# Patient Record
Sex: FEMALE | Race: WHITE | NOT HISPANIC OR LATINO | Employment: OTHER | URBAN - METROPOLITAN AREA
[De-identification: names, ages, dates, MRNs, and addresses within clinical notes are randomized per-mention and may not be internally consistent; named-entity substitution may affect disease eponyms.]

---

## 2021-05-09 ENCOUNTER — HOSPITAL ENCOUNTER (OUTPATIENT)
Dept: RADIOLOGY | Facility: MEDICAL CENTER | Age: 66
End: 2021-05-09
Attending: INTERNAL MEDICINE
Payer: MEDICARE

## 2021-05-09 DIAGNOSIS — R63.6 UNDERWEIGHT: ICD-10-CM

## 2021-05-09 DIAGNOSIS — K90.0 CELIAC DISEASE: ICD-10-CM

## 2021-05-09 DIAGNOSIS — K31.84 GASTROPARESIS: ICD-10-CM

## 2021-05-09 DIAGNOSIS — K85.90 ACUTE PANCREATITIS, UNSPECIFIED COMPLICATION STATUS, UNSPECIFIED PANCREATITIS TYPE: ICD-10-CM

## 2021-05-09 DIAGNOSIS — R10.13 ABDOMINAL PAIN, EPIGASTRIC: ICD-10-CM

## 2021-05-09 PROCEDURE — 74183 MRI ABD W/O CNTR FLWD CNTR: CPT | Mod: MH

## 2021-05-09 PROCEDURE — 700117 HCHG RX CONTRAST REV CODE 255

## 2021-05-09 PROCEDURE — A9576 INJ PROHANCE MULTIPACK: HCPCS

## 2021-05-09 RX ADMIN — GADOTERIDOL 6 ML: 279.3 INJECTION, SOLUTION INTRAVENOUS at 16:07

## 2021-07-20 ENCOUNTER — HOSPITAL ENCOUNTER (OUTPATIENT)
Dept: RADIOLOGY | Facility: MEDICAL CENTER | Age: 66
End: 2021-07-20
Attending: INTERNAL MEDICINE
Payer: MEDICARE

## 2021-07-20 DIAGNOSIS — R10.13 EPIGASTRIC PAIN: ICD-10-CM

## 2021-07-20 DIAGNOSIS — R63.6 UNDERWEIGHT: ICD-10-CM

## 2021-07-20 DIAGNOSIS — K85.90 ACUTE PANCREATITIS, UNSPECIFIED COMPLICATION STATUS, UNSPECIFIED PANCREATITIS TYPE: ICD-10-CM

## 2021-07-20 DIAGNOSIS — K31.84 GASTROPARESIS: ICD-10-CM

## 2021-07-20 PROCEDURE — A9541 TC99M SULFUR COLLOID: HCPCS

## 2021-08-02 ENCOUNTER — PRE-ADMISSION TESTING (OUTPATIENT)
Dept: ADMISSIONS | Facility: MEDICAL CENTER | Age: 66
End: 2021-08-02
Attending: INTERNAL MEDICINE
Payer: MEDICARE

## 2021-08-02 VITALS — WEIGHT: 66 LBS | HEIGHT: 62 IN | BODY MASS INDEX: 12.15 KG/M2

## 2021-08-02 RX ORDER — HYDROCODONE BITARTRATE AND ACETAMINOPHEN 5; 325 MG/1; MG/1
1 TABLET ORAL EVERY 4 HOURS PRN
Status: ON HOLD | COMMUNITY
End: 2023-05-10 | Stop reason: SDUPTHER

## 2021-08-02 RX ORDER — LEVOTHYROXINE SODIUM 0.05 MG/1
50 TABLET ORAL
COMMUNITY

## 2021-08-02 RX ORDER — BIOTIN 10 MG
TABLET ORAL DAILY
COMMUNITY
End: 2023-07-06

## 2021-08-02 NOTE — PREPROCEDURE INSTRUCTIONS
" Reviewed \"Preparing for your procedure\" verbally and copy emailed to pt. Also emailed \"fasting\", Pain management\", \"Patient Safety\", \"Speak-up\" handouts. Agreed with procedure listed on consent. Instructed to take thyroid med DOS.  "

## 2021-08-05 NOTE — OR NURSING
Called [facility] to obtain copy of COVID test results, went to voicemail recording- states that clinic is closed due to evacuation. Called patient to see if she had copy of results and pt states she did not have any results. Pt states she has been evacuated along with the whole town. Asked patient if she is going to postpone procedure do to these circumstances and she states no she wants to go ahead and still proceed as planned. Discussed situation with Shelli Preop Manager and she agreed that we can test patient morning of procedure. Called patient back and informed her that she will need to call [phone] once she arrived to parking lot and stay in car, and a nurse will come out and test her and will call her when she is cleared to check in for procedure. Called Dr Cadet's office, could not get thru to  had to leave a message regarding the plan of patient being COVID tested DOS.

## 2021-08-06 NOTE — OR NURSING
1. Caller Name: Liban                          Call Back Number: [phone]        2.  Does patient have any active symptoms of respiratory illness (fever OR cough OR shortness of breath)? No.

## 2021-08-08 ENCOUNTER — ANESTHESIA EVENT (OUTPATIENT)
Dept: SURGERY | Facility: MEDICAL CENTER | Age: 66
End: 2021-08-08
Payer: MEDICARE

## 2021-08-08 PROBLEM — Z98.890 PONV (POSTOPERATIVE NAUSEA AND VOMITING): Chronic | Status: ACTIVE | Noted: 2021-08-08

## 2021-08-08 PROBLEM — R11.2 PONV (POSTOPERATIVE NAUSEA AND VOMITING): Chronic | Status: ACTIVE | Noted: 2021-08-08

## 2021-08-09 ENCOUNTER — HOSPITAL ENCOUNTER (OUTPATIENT)
Facility: MEDICAL CENTER | Age: 66
End: 2021-08-09
Attending: INTERNAL MEDICINE | Admitting: INTERNAL MEDICINE
Payer: MEDICARE

## 2021-08-09 ENCOUNTER — ANESTHESIA (OUTPATIENT)
Dept: SURGERY | Facility: MEDICAL CENTER | Age: 66
End: 2021-08-09
Payer: MEDICARE

## 2021-08-09 VITALS
BODY MASS INDEX: 11.9 KG/M2 | DIASTOLIC BLOOD PRESSURE: 55 MMHG | RESPIRATION RATE: 16 BRPM | HEART RATE: 54 BPM | TEMPERATURE: 97.3 F | WEIGHT: 65.04 LBS | SYSTOLIC BLOOD PRESSURE: 94 MMHG | OXYGEN SATURATION: 100 %

## 2021-08-09 PROBLEM — K80.50 BILIARY COLIC: Chronic | Status: ACTIVE | Noted: 2021-08-09

## 2021-08-09 PROBLEM — K31.84 GASTROPARESIS: Status: ACTIVE | Noted: 2021-08-09

## 2021-08-09 LAB
SARS-COV+SARS-COV-2 AG RESP QL IA.RAPID: NOTDETECTED
SPECIMEN SOURCE: NORMAL

## 2021-08-09 PROCEDURE — 700101 HCHG RX REV CODE 250: Performed by: ANESTHESIOLOGY

## 2021-08-09 PROCEDURE — 160046 HCHG PACU - 1ST 60 MINS PHASE II: Performed by: INTERNAL MEDICINE

## 2021-08-09 PROCEDURE — 700101 HCHG RX REV CODE 250: Performed by: INTERNAL MEDICINE

## 2021-08-09 PROCEDURE — 110371 HCHG SHELL REV 272: Performed by: INTERNAL MEDICINE

## 2021-08-09 PROCEDURE — 160208 HCHG ENDO MINUTES - EA ADDL 1 MIN LEVEL 4: Performed by: INTERNAL MEDICINE

## 2021-08-09 PROCEDURE — 160002 HCHG RECOVERY MINUTES (STAT): Performed by: INTERNAL MEDICINE

## 2021-08-09 PROCEDURE — 700111 HCHG RX REV CODE 636 W/ 250 OVERRIDE (IP): Performed by: ANESTHESIOLOGY

## 2021-08-09 PROCEDURE — C1726 CATH, BAL DIL, NON-VASCULAR: HCPCS | Performed by: INTERNAL MEDICINE

## 2021-08-09 PROCEDURE — 160009 HCHG ANES TIME/MIN: Performed by: INTERNAL MEDICINE

## 2021-08-09 PROCEDURE — 160048 HCHG OR STATISTICAL LEVEL 1-5: Performed by: INTERNAL MEDICINE

## 2021-08-09 PROCEDURE — 700102 HCHG RX REV CODE 250 W/ 637 OVERRIDE(OP): Performed by: ANESTHESIOLOGY

## 2021-08-09 PROCEDURE — C1769 GUIDE WIRE: HCPCS | Performed by: INTERNAL MEDICINE

## 2021-08-09 PROCEDURE — A9270 NON-COVERED ITEM OR SERVICE: HCPCS | Performed by: ANESTHESIOLOGY

## 2021-08-09 PROCEDURE — 160025 RECOVERY II MINUTES (STATS): Performed by: INTERNAL MEDICINE

## 2021-08-09 PROCEDURE — 160035 HCHG PACU - 1ST 60 MINS PHASE I: Performed by: INTERNAL MEDICINE

## 2021-08-09 PROCEDURE — 700105 HCHG RX REV CODE 258: Performed by: INTERNAL MEDICINE

## 2021-08-09 PROCEDURE — 87426 SARSCOV CORONAVIRUS AG IA: CPT

## 2021-08-09 PROCEDURE — 160203 HCHG ENDO MINUTES - 1ST 30 MINS LEVEL 4: Performed by: INTERNAL MEDICINE

## 2021-08-09 RX ORDER — OXYCODONE HCL 5 MG/5 ML
10 SOLUTION, ORAL ORAL
Status: COMPLETED | OUTPATIENT
Start: 2021-08-09 | End: 2021-08-09

## 2021-08-09 RX ORDER — ONDANSETRON 2 MG/ML
INJECTION INTRAMUSCULAR; INTRAVENOUS PRN
Status: DISCONTINUED | OUTPATIENT
Start: 2021-08-09 | End: 2021-08-09 | Stop reason: SURG

## 2021-08-09 RX ORDER — MEPERIDINE HYDROCHLORIDE 25 MG/ML
6.25 INJECTION INTRAMUSCULAR; INTRAVENOUS; SUBCUTANEOUS
Status: DISCONTINUED | OUTPATIENT
Start: 2021-08-09 | End: 2021-08-09 | Stop reason: HOSPADM

## 2021-08-09 RX ORDER — SUCCINYLCHOLINE CHLORIDE 20 MG/ML
INJECTION INTRAMUSCULAR; INTRAVENOUS PRN
Status: DISCONTINUED | OUTPATIENT
Start: 2021-08-09 | End: 2021-08-09 | Stop reason: SURG

## 2021-08-09 RX ORDER — HALOPERIDOL 5 MG/ML
1 INJECTION INTRAMUSCULAR
Status: DISCONTINUED | OUTPATIENT
Start: 2021-08-09 | End: 2021-08-09 | Stop reason: HOSPADM

## 2021-08-09 RX ORDER — ONDANSETRON 2 MG/ML
4 INJECTION INTRAMUSCULAR; INTRAVENOUS
Status: DISCONTINUED | OUTPATIENT
Start: 2021-08-09 | End: 2021-08-09 | Stop reason: HOSPADM

## 2021-08-09 RX ORDER — PHENYLEPHRINE HCL IN 0.9% NACL 0.5 MG/5ML
SYRINGE (ML) INTRAVENOUS PRN
Status: DISCONTINUED | OUTPATIENT
Start: 2021-08-09 | End: 2021-08-09 | Stop reason: SURG

## 2021-08-09 RX ORDER — SODIUM CHLORIDE, SODIUM LACTATE, POTASSIUM CHLORIDE, CALCIUM CHLORIDE 600; 310; 30; 20 MG/100ML; MG/100ML; MG/100ML; MG/100ML
INJECTION, SOLUTION INTRAVENOUS CONTINUOUS
Status: DISCONTINUED | OUTPATIENT
Start: 2021-08-09 | End: 2021-08-09 | Stop reason: HOSPADM

## 2021-08-09 RX ORDER — OXYCODONE HCL 5 MG/5 ML
5 SOLUTION, ORAL ORAL
Status: COMPLETED | OUTPATIENT
Start: 2021-08-09 | End: 2021-08-09

## 2021-08-09 RX ORDER — LIDOCAINE HYDROCHLORIDE 20 MG/ML
INJECTION, SOLUTION EPIDURAL; INFILTRATION; INTRACAUDAL; PERINEURAL PRN
Status: DISCONTINUED | OUTPATIENT
Start: 2021-08-09 | End: 2021-08-09 | Stop reason: SURG

## 2021-08-09 RX ORDER — DEXAMETHASONE SODIUM PHOSPHATE 4 MG/ML
INJECTION, SOLUTION INTRA-ARTICULAR; INTRALESIONAL; INTRAMUSCULAR; INTRAVENOUS; SOFT TISSUE PRN
Status: DISCONTINUED | OUTPATIENT
Start: 2021-08-09 | End: 2021-08-09 | Stop reason: SURG

## 2021-08-09 RX ORDER — ACETAMINOPHEN 325 MG/1
650 TABLET ORAL EVERY 4 HOURS PRN
Status: DISCONTINUED | OUTPATIENT
Start: 2021-08-09 | End: 2021-08-09 | Stop reason: HOSPADM

## 2021-08-09 RX ADMIN — SODIUM CHLORIDE, POTASSIUM CHLORIDE, SODIUM LACTATE AND CALCIUM CHLORIDE: 600; 310; 30; 20 INJECTION, SOLUTION INTRAVENOUS at 07:07

## 2021-08-09 RX ADMIN — PROPOFOL 20 MG: 10 INJECTION, EMULSION INTRAVENOUS at 08:29

## 2021-08-09 RX ADMIN — OXYCODONE HYDROCHLORIDE 5 MG: 5 SOLUTION ORAL at 08:49

## 2021-08-09 RX ADMIN — SUCCINYLCHOLINE CHLORIDE 30 MG: 20 INJECTION, SOLUTION INTRAMUSCULAR; INTRAVENOUS at 07:43

## 2021-08-09 RX ADMIN — ONDANSETRON 4 MG: 2 INJECTION INTRAMUSCULAR; INTRAVENOUS at 08:19

## 2021-08-09 RX ADMIN — DEXAMETHASONE SODIUM PHOSPHATE 4 MG: 4 INJECTION, SOLUTION INTRAMUSCULAR; INTRAVENOUS at 07:46

## 2021-08-09 RX ADMIN — FENTANYL CITRATE 25 MCG: 50 INJECTION, SOLUTION INTRAMUSCULAR; INTRAVENOUS at 09:00

## 2021-08-09 RX ADMIN — Medication 100 MCG: at 07:48

## 2021-08-09 RX ADMIN — PROPOFOL 60 MG: 10 INJECTION, EMULSION INTRAVENOUS at 07:43

## 2021-08-09 RX ADMIN — FENTANYL CITRATE 25 MCG: 50 INJECTION, SOLUTION INTRAMUSCULAR; INTRAVENOUS at 08:49

## 2021-08-09 RX ADMIN — LIDOCAINE HYDROCHLORIDE 0.5 ML: 10 INJECTION, SOLUTION INFILTRATION; PERINEURAL at 07:07

## 2021-08-09 RX ADMIN — LIDOCAINE HYDROCHLORIDE 40 MG: 20 INJECTION, SOLUTION EPIDURAL; INFILTRATION; INTRACAUDAL; PERINEURAL at 07:43

## 2021-08-09 RX ADMIN — FENTANYL CITRATE 25 MCG: 50 INJECTION, SOLUTION INTRAMUSCULAR; INTRAVENOUS at 08:50

## 2021-08-09 NOTE — OR NURSING
0904: Care assumed of drowsy pt who rouses easily and describes pain as tolerable.  0920: Meets criteria to transfer to Stage 2.

## 2021-08-09 NOTE — PROCEDURES
DATE OF PROCEDURE:  08/09/2021     PROCEDURES:  1.  Esophagogastroduodenoscopy with hydraulic dilation of gastric outlet.  2.  Endoscopic ultrasound, upper.  3.  Endoscopic retrograde cholangiopancreatography with biliary   sphincterotomy, bile duct balloon soft stone debris extraction, radiologic   interpretation of static and dynamic fluoroscopic images by myself, at no time   was a radiologist present in the room.     INDICATIONS:  Biliary colic, history of elevated liver enzymes, abdominal   pain, failure to thrive.     FINAL IMPRESSION:  EGD FINDINGS:  1.  Esophagus:  Unremarkable mucosa.  2.  Long and J-shaped stomach, otherwise unremarkable mucosa.  3.  Mildly stenotic pylorus, successfully dilated to 20 mm with balloon.  4.  Duodenum:  Unremarkable mucosa.  Possible extrinsic compression of the   second portion.     ENDOSCOPIC ULTRASOUND FINDINGS:  1.  Celiac axis, no adenopathy.  2.  Pancreatic body and tail, normal parenchyma.  3.  Pancreatic duct, normal course and caliber.  4.  Gallbladder, surgically absent.  5.  Biliary ampulla, unremarkable.  6.  Common bile duct, generous caliber down to the level of the ampulla.    Normal course.  Small subtle nonshadowing filling defect suspicious of stone   versus sludge ball.  7.  Head of pancreas, normal parenchyma and normal dorsal ventral transition.     ERCP FINDINGS:  1.  Biliary ampulla, small, but otherwise unremarkable appearance.  Stenotic   to wire and cannula passage.  2.  Pancreatic duct, neither injected nor cannulated.  3.  Common bile duct, normal course.  Generous caliber.  Small distal filling   defect consistent with small stone.     THERAPEUTICS:  1.  10 mm biliary sphincterotomy with bow papillotome over covered wire.  2.  Bile duct balloon stone extraction productive of soft stone debris.     RECOMMENDATIONS:  1.  Follow liver enzymes.  2.  Low fat diet for 24 hours, then advance as tolerated.  3.  Follow up in the office with   Candelario.  4.  If symptoms persist, consider further workup for superior mesenteric   artery syndrome.     PROCEDURE:  Prior to the procedure, physical exam was stable.  During   procedure, vital signs remained within normal limits.  Prior to sedation,   informed consent was obtained.  Risks of bleeding, infection, perforation,   adverse reaction to sedating medicine, failure to identify pathology and death   explained to the patient at length.  She accepted all risks.  The patient was   prepped in the prone position after intubation and sedation by anesthesia.     EGD:  Scope tip of the Olympus flexible gastroscope passed in the proximal   esophagus.  Proximal middle and distal thirds of the esophagus were well   visualized and were unremarkable.  Stomach was entered.  Gastric pool   suctioned, air insufflated, scope retroflexed to view the body, fundus, and   cardia, then straightened to view the antrum and incisura.  The stomach was   long and J-shaped, but otherwise had unremarkable mucosa throughout.  The   pylorus was patent, but mildly stenotic.  The duodenal bulb, sweep, second and   third portion showed unremarkable mucosa, but there was slight suspected   extrinsic compression of the second portion of unclear significance, possibly   representing SMA syndrome.  The gastroscope was left in the third portion of   the duodenum and a wire-guided 18-19-20 mm hydraulic dilation balloon was   exchanged over wire down into the duodenum.  This was withdrawn gently across   the pylorus and slowly inflated to 18, then 19, then 20 mm.  This was held in   place for 2 minutes at 20 mm and slightly over inflated by 1 atmosphere and   held in place for an additional 30 seconds.  The balloon was deflated and   significant dilation of the pylorus was observed.  Tiny mucosal break was   seen.  The balloon and scope were withdrawn.  Air and liquid were suctioned   and we proceeded with endoscopic ultrasound.     EUS:  A  flexible radial echoendoscope passed in the proximal stomach.  Imaging   of the celiac axis was unremarkable.  Imaging of the pancreatic body and tail   demonstrated very normal appearing parenchyma throughout with no evidence of   chronic pancreatitis.  The pancreatic duct itself had a normal course and   caliber.  Scope was advanced into the antrum. From the antrum, the gallbladder   was not identified consistent with being surgically absent.  The scope was   advanced into the duodenum.  The biliary ampulla was endosonographically   unremarkable.  The head of the pancreas showed normal dorsal ventral   transition and very normal parenchyma with no evidence of chronic   pancreatitis.  The common bile duct was generous caliber down to the level of   the ampulla that had normal course.  There was a subtle small nonshadowing   filling defect in the bile duct, likely representing a soft stone.  The radial   echoendoscope was withdrawn and we proceeded with endoscopic retrograde   cholangiopancreatography.     ERCP: The flexible side-viewing TJF duodenoscope passed to the level of the   biliary ampulla in the short position.  The biliary ampulla was very small,   but otherwise unremarkable appearance.  An Olympus CleverCut sphincterotome   with a 0.035 wire was utilized for selective cannulation of the common bile   duct.  At no time was the pancreatic duct injected or cannulated.  The   cannulation of the bile duct was slightly challenging.  This was probed with   the wire and the tip of the cannula until the wire passed.  Even after the   wire passed along the expected course of the bile duct, this was followed with   cannula and the ampulla was very stenotic to passage of the cannula.  This is   suggestive of stenosis versus sphincter of Oddi spasm.  Aspiration was made   and was positive for bile.  Injection of contrast demonstrated normal course   of the bile duct, but generous caliber down to the ampulla.  There  was a   subtle filling defect in the distal duct consistent with a very small stone.    Following this, a 10 mm biliary sphincterotomy was performed with bow   papillotome over the covered wire in the usual fashion.  A gush of bile was   seen.  The tome was removed and a 9-12 mm occlusion balloon was exchanged over   the wire and 3 separate balloon sweeps were made from the common hepatic   duct.  The first balloon sweep was productive of soft stone debris consistent   with a stone fracturing on passage of the balloon.  The balloon pulled through   the sphincterotomy site with minimal effort.  The additional balloon sweeps   were completely negative for any further debris.  Procedure was deemed   complete at this time.  Scope was withdrawn.  Air and liquid were suctioned.    Patient tolerated the procedure well and was sent to recovery without   immediate complications.        ______________________________  MD JULIET DUNLAP/ERICA    DD:  08/09/2021 08:45  DT:  08/09/2021 09:41    Job#:  [id number]

## 2021-08-09 NOTE — ANESTHESIA PREPROCEDURE EVALUATION
66 yo F with history of recurrent pancreatitis, gastroparesis and PONV here for EGD and likely ERCP.  NPO.     Relevant Problems   ANESTHESIA   (positive) PONV (postoperative nausea and vomiting)      Other   (positive) Gastroparesis   (positive) Recurrent pancreatitis       Physical Exam    Airway   Mallampati: I  TM distance: >3 FB  Neck ROM: full       Cardiovascular - normal exam  Rhythm: regular  Rate: normal  (-) murmur     Dental - normal exam           Pulmonary - normal exam  Breath sounds clear to auscultation     Abdominal    Neurological - normal exam               Anesthesia Plan    ASA 3   ASA physical status 3 criteria: other (comment)    Plan - general       Airway plan will be ETT          Induction: intravenous and rapid sequence      Pertinent diagnostic labs and testing reviewed    Informed Consent:    Anesthetic plan and risks discussed with patient.    Use of blood products discussed with: patient whom consented to blood products.

## 2021-08-09 NOTE — ANESTHESIA POSTPROCEDURE EVALUATION
Patient: Kathy Dumont    Procedure Summary     Date: 08/09/21 Room / Location:  ENDOSCOPIC ULTRASOUND ROOM / SURGERY [facility]    Anesthesia Start:  Anesthesia Stop:     Procedures:       GASTROSCOPY      EGD, WITH ENDOSCOPIC US - UPPER RADIAL      EGD, WITH ASPIRATION BIOPSY - WITH FINE NEEDLE ASPIRATION Diagnosis: (ACUTE RECURRENT PANCREATITIS, EPIGASTRIC PAIN, ABNORMAL WEIGHT LOSS)    Surgeons: Rivas Cadet M.D. Responsible Provider:     Anesthesia Type: Not recorded ASA Status: Not recorded          Final Anesthesia Type: general  Last vitals  BP   Blood Pressure : 103/55    Temp   36 °C (96.8 °F)    Pulse   (!) 54   Resp   14    SpO2   95 %      Anesthesia Post Evaluation    Patient location during evaluation: PACU  Patient participation: complete - patient participated  Level of consciousness: awake and alert    Airway patency: patent  Anesthetic complications: no  Cardiovascular status: hemodynamically stable  Respiratory status: acceptable  Hydration status: euvolemic    PONV: none          No complications documented.     Nurse Pain Score: 0 (NPRS)

## 2021-08-09 NOTE — ANESTHESIA TIME REPORT
Anesthesia Start and Stop Event Times     Date Time Event    8/9/2021 0725 Ready for Procedure     0739 Anesthesia Start     0833 Anesthesia Stop        Responsible Staff  08/09/21    Name Role Begin End    Kaykay Cantrell M.D. Anesth 0739 0833        Preop Diagnosis (Free Text):  Pre-op Diagnosis     ACUTE RECURRENT PANCREATITIS, EPIGASTRIC PAIN, ABNORMAL WEIGHT LOSS        Preop Diagnosis (Codes):  Diagnosis Information     Diagnosis Code(s): Choledocholithiasis [K80.50]        Post op Diagnosis  Acute recurrent pancreatitis      Premium Reason  Non-Premium    Comments:

## 2021-08-09 NOTE — OR NURSING
0920: Report from Marissa TO    0924: Patient arrived from PACU via gurney.    Sedation/Resp Status: Alert, spontaneous eye opening. Respirations spontaneous and non-labored. Denies pain/nausea.     0941: Family arrived to patient station    0955: Patient education completed, family denies further questions. DC'd to care of family post uneventful stay in PACU 2.

## 2021-08-09 NOTE — OR SURGEON
Immediate Post OP Note    PreOp Diagnosis: Biliary colic, abdominal pain      PostOp Diagnosis: Same      Procedure(s):  GASTROSCOPY - Wound Class: Clean Contaminated  EGD, WITH ENDOSCOPIC US - UPPER RADIAL - Wound Class: Clean Contaminated  EGD, WITH ASPIRATION BIOPSY - WITH FINE NEEDLE ASPIRATION - Wound Class: Clean Contaminated  ERCP, DIAGNOSTIC - Wound Class: Clean Contaminated    Surgeon(s):  Rivas Cadet M.D.    Anesthesiologist/Type of Anesthesia:  Anesthesiologist: Kaykay Cantrell M.D./General    Surgical Staff:  Circulator: Eduardo Jain R.N.  Endoscopy Technician: Drew Garrison    Specimens removed if any:  * No specimens in log *    Dr. Cadet  [facility]  [place]  [phone]    EGD with: Hydraulic dilation of gastric outlet    EUS upper    ERCP with: Biliary sphincterotomy    Bile duct balloon soft stone debris extraction    Radiologic interpretation of static and dynamic fluoroscopic images    Indication: Biliary colic, failure to thrive, abdominal pain    Sedation: GA (Óscar)    Findings:   EGD    Esophagus     Unremarkable mucosa    Stomach     Long and J-shaped     Unremarkable mucosa    Pylorus     Mildly stenotic     Hydraulically dilated with 18-19-20 mm balloon    Duodenum     Unremarkable mucosa     Compressed in 2nd portion     EUS    Celiac axis     No adenopathy    Pancreas body/tail     Normal parenchyma    Pancreatic duct     Normal course and caliber    Gallbladder     Absent    Ampulla     Unremarkable    CBD     Normal course     Generous caliber down to ampulla     Small, subtle, non-shadowing filling defect    Head of pancreas     Normal parenchyma     Normal dorsal / ventral transition     ERCP    Ampulla     Small     Otherwise unremarkable appearance     Stenotic to wire and cannula passage    Pancreatic duct     Neither injected nor cannulated    CBD     Normal course     Generous caliber     Small distal filling defect     Therapeutics    10mm  biliary sphincterotomy with bow papillotome over covered wire    Bile duct balloon extraction of soft stone debris    Plan:  Follow liver enzymes    Low fat diet x 24 hours then advance as tolerated    Follow up in office with Dr. Palomino    If symptoms persist, consider further workup for SMA syndrome      8/9/2021 8:29 AM Rivas Cadet M.D.

## 2021-08-09 NOTE — ANESTHESIA PROCEDURE NOTES
Airway    Date/Time: 8/9/2021 7:44 AM  Performed by: Kaykay Cantrell M.D.  Authorized by: Kaykay Cantrell M.D.     Location:  OR  Urgency:  Elective  Difficult Airway: No    Indications for Airway Management:  Anesthesia      Spontaneous Ventilation: absent    Sedation Level:  Deep  Preoxygenated: Yes    Patient Position:  Sniffing  Mask Difficulty Assessment:  0 - not attempted  Final Airway Type:  Endotracheal airway  Final Endotracheal Airway:  ETT  Cuffed: Yes    Technique Used for Successful ETT Placement:  Direct laryngoscopy  Devices/Methods Used in Placement:  Cricoid pressure and intubating stylet    Insertion Site:  Oral  Blade Type:  Denis  Laryngoscope Blade/Videolaryngoscope Blade Size:  3  ETT Size (mm):  6.5  Measured from:  Teeth  ETT to Teeth (cm):  20  Placement Verified by: auscultation and capnometry    Cormack-Lehane Classification:  Grade I - full view of glottis  Number of Attempts at Approach:  1

## 2021-08-09 NOTE — DISCHARGE INSTRUCTIONS
ENDOSCOPY HOME CARE INSTRUCTIONS    GASTROSCOPY OR ERCP  1. Don't eat or drink anything for about an hour after the test. Low Fat Diet for 24 hours then  Slowly return to normal diet.   2. Don't drive or drink alcohol for 24 hours. The medication you received will make you too drowsy.  3. Don't take any coffee, tea, or aspirin products until after you see your doctor. These can harm the lining of your stomach.  4. If you begin to vomit bloody material, or develop black or bloody stools, call your doctor as soon as possible.  5. If you have any neck, chest, abdominal pain or temp of 100 degrees, call your doctor.    Physician's telephone #: [phone]    You were Given oral pain medication oxycodone in recovery at 8:49 am.         You should call 911 if you develop problems with breathing or chest pain.  If any questions arise, call your doctor. If your doctor is not available, please feel free to call [phone]. You can also call the HEALTH HOTLINE open 24 hours/day, 7 days/week and speak to a nurse at [phone], or toll free [phone].    Depression / Suicide Risk    As you are discharged from [facility], it is important to learn how to keep safe from harming yourself.    Recognize the warning signs:  · Abrupt changes in personality, positive or negative- including increase in energy   · Giving away possessions  · Change in eating patterns- significant weight changes-  positive or negative  · Change in sleeping patterns- unable to sleep or sleeping all the time   · Unwillingness or inability to communicate  · Depression  · Unusual sadness, discouragement and loneliness  · Talk of wanting to die  · Neglect of personal appearance   · Rebelliousness- reckless behavior  · Withdrawal from people/activities they love  · Confusion- inability to concentrate     If you or a loved one observes any of these behaviors or has concerns about self-harm, here's what you can do:  · Talk about it-  your feelings and reasons for harming yourself  · Remove any means that you might use to hurt yourself (examples: pills, rope, extension cords, firearm)  · Get professional help from the community (Mental Health, Substance Abuse, psychological counseling)  · Do not be alone:Call your Safe Contact- someone whom you trust who will be there for you.  · Call your local CRISIS HOTLINE 594-1869 or 675-775-6962  · Call your local Children's Mobile Crisis Response Team [place] (750) 203-2129 or wwwMobileOCT  · Call the toll free National Suicide Prevention Hotlines   · National Suicide Prevention Lifeline 168-015-ZVQT (8756)  · National Hope Line Network 800-SUICIDE (278-0114)    I acknowledge receipt and understanding of these Home Care Instructions.

## 2022-04-07 ENCOUNTER — PRE-ADMISSION TESTING (OUTPATIENT)
Dept: ADMISSIONS | Facility: MEDICAL CENTER | Age: 67
End: 2022-04-07
Attending: INTERNAL MEDICINE
Payer: MEDICARE

## 2022-04-07 RX ORDER — BIOTIN 10 MG
TABLET ORAL DAILY
COMMUNITY
End: 2023-07-06

## 2022-04-07 NOTE — PREPROCEDURE INSTRUCTIONS
"Preadmit appointment: \" Preparing for your Procedure information\" sheet given to patient with verbal and written instructions (emailed). Patient instructed to continue prescribed medications through the day before surgery, instructed to take the following medications the day of surgery per anesthesia protocol: Levothyroxine, hydrocodone if needed  Verbal and written instructions on covid symptoms to watch for given to patient and patient instructed to call MD if any symptoms develop prior to surgery/procedure. Pt reports hx PONV, advised to discuss with anesthesia day of surgery.  No covid symptoms   "

## 2022-04-15 NOTE — OR NURSING
0832- pt arrived from [facility] following gastroscopy with ERCP, she is on 4L O2 via mask and arousable via physical stimulation.     0849- pt sat up in bed, O2 removed. Medicated for  abdominal pain per orders       0900-called and updated patients  Prasad.     0904-report given to Marissa TO. Pt reports pain level 6, but tolerable at this time.              Humphrey Desouza,    Patient stated now that she was diagnosed with bilateral PE back in 10/8/2021. Patient is having dental extraction on Monday 4/18/2022. Can she proceed with dental extraction and does she need bridging still since it been less than 6 months?     Thank you,    Aretha

## 2022-04-18 ENCOUNTER — APPOINTMENT (OUTPATIENT)
Dept: RADIOLOGY | Facility: MEDICAL CENTER | Age: 67
End: 2022-04-18
Attending: INTERNAL MEDICINE
Payer: MEDICARE

## 2022-04-18 ENCOUNTER — ANESTHESIA EVENT (OUTPATIENT)
Dept: SURGERY | Facility: MEDICAL CENTER | Age: 67
End: 2022-04-18
Payer: MEDICARE

## 2022-04-18 ENCOUNTER — ANESTHESIA (OUTPATIENT)
Dept: SURGERY | Facility: MEDICAL CENTER | Age: 67
End: 2022-04-18
Payer: MEDICARE

## 2022-04-18 ENCOUNTER — HOSPITAL ENCOUNTER (OUTPATIENT)
Facility: MEDICAL CENTER | Age: 67
End: 2022-04-18
Attending: INTERNAL MEDICINE | Admitting: INTERNAL MEDICINE
Payer: MEDICARE

## 2022-04-18 VITALS
SYSTOLIC BLOOD PRESSURE: 101 MMHG | RESPIRATION RATE: 16 BRPM | HEIGHT: 62 IN | OXYGEN SATURATION: 97 % | WEIGHT: 64.59 LBS | DIASTOLIC BLOOD PRESSURE: 55 MMHG | BODY MASS INDEX: 11.89 KG/M2 | HEART RATE: 64 BPM | TEMPERATURE: 97.5 F

## 2022-04-18 LAB — EKG IMPRESSION: NORMAL

## 2022-04-18 PROCEDURE — C1769 GUIDE WIRE: HCPCS | Performed by: INTERNAL MEDICINE

## 2022-04-18 PROCEDURE — 160208 HCHG ENDO MINUTES - EA ADDL 1 MIN LEVEL 4: Performed by: INTERNAL MEDICINE

## 2022-04-18 PROCEDURE — 160203 HCHG ENDO MINUTES - 1ST 30 MINS LEVEL 4: Performed by: INTERNAL MEDICINE

## 2022-04-18 PROCEDURE — 700101 HCHG RX REV CODE 250: Performed by: ANESTHESIOLOGY

## 2022-04-18 PROCEDURE — 160046 HCHG PACU - 1ST 60 MINS PHASE II: Performed by: INTERNAL MEDICINE

## 2022-04-18 PROCEDURE — 160009 HCHG ANES TIME/MIN: Performed by: INTERNAL MEDICINE

## 2022-04-18 PROCEDURE — 00732 ANES UPR GI NDSC PX ERCP: CPT | Performed by: ANESTHESIOLOGY

## 2022-04-18 PROCEDURE — 110371 HCHG SHELL REV 272: Performed by: INTERNAL MEDICINE

## 2022-04-18 PROCEDURE — 160025 RECOVERY II MINUTES (STATS): Performed by: INTERNAL MEDICINE

## 2022-04-18 PROCEDURE — 160048 HCHG OR STATISTICAL LEVEL 1-5: Performed by: INTERNAL MEDICINE

## 2022-04-18 PROCEDURE — 160002 HCHG RECOVERY MINUTES (STAT): Performed by: INTERNAL MEDICINE

## 2022-04-18 PROCEDURE — 93005 ELECTROCARDIOGRAM TRACING: CPT | Performed by: INTERNAL MEDICINE

## 2022-04-18 PROCEDURE — 700111 HCHG RX REV CODE 636 W/ 250 OVERRIDE (IP)

## 2022-04-18 PROCEDURE — 700111 HCHG RX REV CODE 636 W/ 250 OVERRIDE (IP): Performed by: ANESTHESIOLOGY

## 2022-04-18 PROCEDURE — C1726 CATH, BAL DIL, NON-VASCULAR: HCPCS | Performed by: INTERNAL MEDICINE

## 2022-04-18 PROCEDURE — 700105 HCHG RX REV CODE 258: Performed by: INTERNAL MEDICINE

## 2022-04-18 PROCEDURE — 93010 ELECTROCARDIOGRAM REPORT: CPT | Performed by: INTERNAL MEDICINE

## 2022-04-18 PROCEDURE — 160035 HCHG PACU - 1ST 60 MINS PHASE I: Performed by: INTERNAL MEDICINE

## 2022-04-18 RX ORDER — ROCURONIUM BROMIDE 10 MG/ML
INJECTION, SOLUTION INTRAVENOUS PRN
Status: DISCONTINUED | OUTPATIENT
Start: 2022-04-18 | End: 2022-04-18 | Stop reason: SURG

## 2022-04-18 RX ORDER — SODIUM CHLORIDE, SODIUM LACTATE, POTASSIUM CHLORIDE, CALCIUM CHLORIDE 600; 310; 30; 20 MG/100ML; MG/100ML; MG/100ML; MG/100ML
INJECTION, SOLUTION INTRAVENOUS CONTINUOUS
Status: ACTIVE | OUTPATIENT
Start: 2022-04-18 | End: 2022-04-18

## 2022-04-18 RX ORDER — DIPHENHYDRAMINE HYDROCHLORIDE 50 MG/ML
12.5 INJECTION INTRAMUSCULAR; INTRAVENOUS
Status: DISCONTINUED | OUTPATIENT
Start: 2022-04-18 | End: 2022-04-18 | Stop reason: HOSPADM

## 2022-04-18 RX ORDER — ONDANSETRON 2 MG/ML
4 INJECTION INTRAMUSCULAR; INTRAVENOUS
Status: DISCONTINUED | OUTPATIENT
Start: 2022-04-18 | End: 2022-04-18 | Stop reason: HOSPADM

## 2022-04-18 RX ORDER — MIDAZOLAM HYDROCHLORIDE 1 MG/ML
INJECTION INTRAMUSCULAR; INTRAVENOUS PRN
Status: DISCONTINUED | OUTPATIENT
Start: 2022-04-18 | End: 2022-04-18 | Stop reason: SURG

## 2022-04-18 RX ORDER — ONDANSETRON 2 MG/ML
INJECTION INTRAMUSCULAR; INTRAVENOUS PRN
Status: DISCONTINUED | OUTPATIENT
Start: 2022-04-18 | End: 2022-04-18 | Stop reason: SURG

## 2022-04-18 RX ORDER — SODIUM CHLORIDE, SODIUM LACTATE, POTASSIUM CHLORIDE, CALCIUM CHLORIDE 600; 310; 30; 20 MG/100ML; MG/100ML; MG/100ML; MG/100ML
INJECTION, SOLUTION INTRAVENOUS CONTINUOUS
Status: DISCONTINUED | OUTPATIENT
Start: 2022-04-18 | End: 2022-04-18 | Stop reason: HOSPADM

## 2022-04-18 RX ORDER — PHENYLEPHRINE HYDROCHLORIDE 10 MG/ML
INJECTION, SOLUTION INTRAMUSCULAR; INTRAVENOUS; SUBCUTANEOUS PRN
Status: DISCONTINUED | OUTPATIENT
Start: 2022-04-18 | End: 2022-04-18 | Stop reason: SURG

## 2022-04-18 RX ORDER — DEXAMETHASONE SODIUM PHOSPHATE 4 MG/ML
INJECTION, SOLUTION INTRA-ARTICULAR; INTRALESIONAL; INTRAMUSCULAR; INTRAVENOUS; SOFT TISSUE PRN
Status: DISCONTINUED | OUTPATIENT
Start: 2022-04-18 | End: 2022-04-18 | Stop reason: SURG

## 2022-04-18 RX ORDER — HALOPERIDOL 5 MG/ML
1 INJECTION INTRAMUSCULAR
Status: DISCONTINUED | OUTPATIENT
Start: 2022-04-18 | End: 2022-04-18 | Stop reason: HOSPADM

## 2022-04-18 RX ADMIN — SUGAMMADEX 200 MG: 100 INJECTION, SOLUTION INTRAVENOUS at 11:22

## 2022-04-18 RX ADMIN — EPHEDRINE SULFATE 10 MG: 50 INJECTION INTRAMUSCULAR; INTRAVENOUS; SUBCUTANEOUS at 10:59

## 2022-04-18 RX ADMIN — MIDAZOLAM HYDROCHLORIDE 2 MG: 1 INJECTION, SOLUTION INTRAMUSCULAR; INTRAVENOUS at 10:53

## 2022-04-18 RX ADMIN — EPHEDRINE SULFATE 20 MG: 50 INJECTION INTRAMUSCULAR; INTRAVENOUS; SUBCUTANEOUS at 11:00

## 2022-04-18 RX ADMIN — PHENYLEPHRINE HYDROCHLORIDE 100 MCG: 10 INJECTION INTRAVENOUS at 11:01

## 2022-04-18 RX ADMIN — PROPOFOL 130 MG: 10 INJECTION, EMULSION INTRAVENOUS at 10:53

## 2022-04-18 RX ADMIN — FENTANYL CITRATE 100 MCG: 50 INJECTION, SOLUTION INTRAMUSCULAR; INTRAVENOUS at 10:53

## 2022-04-18 RX ADMIN — SODIUM CHLORIDE, POTASSIUM CHLORIDE, SODIUM LACTATE AND CALCIUM CHLORIDE: 600; 310; 30; 20 INJECTION, SOLUTION INTRAVENOUS at 10:45

## 2022-04-18 RX ADMIN — DEXAMETHASONE SODIUM PHOSPHATE 4 MG: 4 INJECTION, SOLUTION INTRAMUSCULAR; INTRAVENOUS at 10:53

## 2022-04-18 RX ADMIN — ROCURONIUM BROMIDE 40 MG: 10 INJECTION, SOLUTION INTRAVENOUS at 10:53

## 2022-04-18 RX ADMIN — ONDANSETRON 4 MG: 2 INJECTION INTRAMUSCULAR; INTRAVENOUS at 10:53

## 2022-04-18 ASSESSMENT — PAIN DESCRIPTION - PAIN TYPE: TYPE: CHRONIC PAIN

## 2022-04-18 ASSESSMENT — PAIN SCALES - GENERAL: PAIN_LEVEL: 4

## 2022-04-18 NOTE — ANESTHESIA PROCEDURE NOTES
Airway    Date/Time: 4/18/2022 10:53 AM  Performed by: Gerald Hanks M.D.  Authorized by: Gerald Hanks M.D.     Location:  OR  Urgency:  Elective  Indications for Airway Management:  Anesthesia      Spontaneous Ventilation: absent    Sedation Level:  Deep  Preoxygenated: Yes    Patient Position:  Sniffing  Final Airway Type:  Endotracheal airway  Final Endotracheal Airway:  ETT  Cuffed: Yes    Technique Used for Successful ETT Placement:  Direct laryngoscopy    Insertion Site:  Oral  Blade Type:  Thompson  Laryngoscope Blade/Videolaryngoscope Blade Size:  2  ETT Size (mm):  7.0  Measured from:  Teeth  ETT to Teeth (cm):  21  Placement Verified by: auscultation and capnometry    Cormack-Lehane Classification:  Grade I - full view of glottis  Number of Attempts at Approach:  1

## 2022-04-18 NOTE — ANESTHESIA PREPROCEDURE EVALUATION
Case: [id number] Date/Time: 04/18/22 0945    Procedures:       GASTROSCOPY - WITH PYLORUS DILATION WITH BOTOX INJECTION      ERCP (ENDOSCOPIC RETROGRADE CHOLANGIOPANCREATOGRAPHY)    Pre-op diagnosis: ABNORMAL WEIGHT LOSS, ACUTE RECURRENT PANCREATITIS, CELIAC DISEASE    Location:  ENDOSCOPIC ULTRASOUND ROOM / SURGERY [facility]    Surgeons: Rivas Cadet M.D.          Relevant Problems   ANESTHESIA   (positive) PONV (postoperative nausea and vomiting)       Physical Exam    Airway   Mallampati: II  TM distance: >3 FB  Neck ROM: full       Cardiovascular - normal exam  Rhythm: regular  Rate: normal  (-) murmur     Dental - normal exam           Pulmonary - normal exam  Breath sounds clear to auscultation     Abdominal    Neurological - normal exam                 Anesthesia Plan    ASA 2       Plan - general       Airway plan will be ETT          Induction: intravenous    Postoperative Plan: Postoperative administration of opioids is intended.    Pertinent diagnostic labs and testing reviewed    Informed Consent:    Anesthetic plan and risks discussed with patient.    Use of blood products discussed with: patient whom consented to blood products.

## 2022-04-18 NOTE — DISCHARGE INSTRUCTIONS
ENDOSCOPY HOME CARE INSTRUCTIONS    GASTROSCOPY OR ERCP  1. Don't eat or drink anything for about an hour after the test. You can then resume your regular diet.  2. Don't drive or drink alcohol for 24 hours. The medication you received will make you too drowsy.  3. Don't take any coffee, tea, or aspirin products until after you see your doctor. These can harm the lining of your stomach.  4. If you begin to vomit bloody material, or develop black or bloody stools, call your doctor as soon as possible.  5. If you have any neck, chest, abdominal pain or temp of 100 degrees, call your doctor.  6. See your doctor call to schedule     EGD / ERCP - Post OP Note     PreOp Diagnosis:        Abdominal pain, biliary colic, elevated liver enzymes        PostOp Diagnosis:      Same        Procedure(s):  GASTROSCOPY - WITH PYLORUS DILATION WITH BOTOX INJECTION - Wound Class: Clean Contaminated  ERCP (ENDOSCOPIC RETROGRADE CHOLANGIOPANCREATOGRAPHY) - Wound Class: Clean Contaminated     Surgeon(s):  Rivas Cadet M.D.     Anesthesiologist/Type of Anesthesia:  Anesthesiologist: Gerald Hanks M.D./General     Dr. Cadet  [facility]  [place]  [phone]     EGD with:        Intersphincteric injection of Botox to pylorus                          Hydraulic dilation of pylorus     ERCP with:      Hydraulic dilation of biliary ampulla                          Radiologic interpretation of static and dynamic fluoroscopic images     Indication:        Elevated liver enzymes, abdominal pain, biliary colic, nausea vomiting     Sedation:         GA ([place])     Findings:              EGD                          Esophagus                                      Unremarkable mucosa                          Stomach                                      Unremarkable mucosa                                      Narrow caliber pylorus                          Duodenum                                      Unremarkable mucosa                  ERCP                          Ampulla                                      Post sphincterotomy anatomy                          Pancreatic duct                                      Neither injected nor cannulated                          CBD                                      Normal course and caliber                 Therapeutics                          Intersphincteric injection of Botox - 100U divided 4 quadrants                          Hydraulic dilation of pylorus 18-19-20 mm balloon                          10-11-12 mm hydraulic dilation of biliary ampulla                          Balloon sweeps negative for stones or debris     Plan:                Follow liver enzymes                          Resume preprocedure diet and medications                          Follow-up in office      You should call 911 if you develop problems with breathing or chest pain.  If any questions arise, call your doctor. If your doctor is not available, please feel free to call [phone]. You can also call the HEALTH HOTLINE open 24 hours/day, 7 days/week and speak to a nurse at [phone], or toll free [phone].    Depression / Suicide Risk    As you are discharged from this [facility] facility, it is important to learn how to keep safe from harming yourself.    Recognize the warning signs:  · Abrupt changes in personality, positive or negative- including increase in energy   · Giving away possessions  · Change in eating patterns- significant weight changes-  positive or negative  · Change in sleeping patterns- unable to sleep or sleeping all the time   · Unwillingness or inability to communicate  · Depression  · Unusual sadness, discouragement and loneliness  · Talk of wanting to die  · Neglect of personal appearance   · Rebelliousness- reckless behavior  · Withdrawal from people/activities they love  · Confusion- inability to concentrate     If you or a loved one observes any of these behaviors or has  concerns about self-harm, here's what you can do:  · Talk about it- your feelings and reasons for harming yourself  · Remove any means that you might use to hurt yourself (examples: pills, rope, extension cords, firearm)  · Get professional help from the community (Mental Health, Substance Abuse, psychological counseling)  · Do not be alone:Call your Safe Contact- someone whom you trust who will be there for you.  · Call your local CRISIS HOTLINE 488-5732 or 934-190-2767  · Call your local Children's Mobile Crisis Response Team [place] (627) 103-0457 or www.[employer]  · Call the toll free National Suicide Prevention Hotlines   · National Suicide Prevention Lifeline 119-390-KQHM (7549)  · National Hope Line Network 800-SUICIDE (688-8757)    I acknowledge receipt and understanding of these Home Care Instructions.

## 2022-04-18 NOTE — OR SURGEON
EGD / ERCP - Post OP Note    PreOp Diagnosis: Abdominal pain, biliary colic, elevated liver enzymes      PostOp Diagnosis: Same      Procedure(s):  GASTROSCOPY - WITH PYLORUS DILATION WITH BOTOX INJECTION - Wound Class: Clean Contaminated  ERCP (ENDOSCOPIC RETROGRADE CHOLANGIOPANCREATOGRAPHY) - Wound Class: Clean Contaminated    Surgeon(s):  Rivas Cadet M.D.    Anesthesiologist/Type of Anesthesia:  Anesthesiologist: Gerald Hanks M.D./General    Surgical Staff:  Circulator: Heather C Cogan, R.N.  Endoscopy Technician: Aleks Cruz  Radiology Technologist: Daisy Todd; Shelli Cabezas    Specimens removed if any:  * No specimens in log *    Dr. Cadet  [facility]  [place]  [phone]    EGD with: Intersphincteric injection of Botox to pylorus    Hydraulic dilation of pylorus    ERCP with: Hydraulic dilation of biliary ampulla    Radiologic interpretation of static and dynamic fluoroscopic images    Indication: Elevated liver enzymes, abdominal pain, biliary colic, nausea vomiting    Sedation: GA (Markin)    Findings:   EGD    Esophagus     Unremarkable mucosa    Stomach     Unremarkable mucosa     Narrow caliber pylorus    Duodenum     Unremarkable mucosa     ERCP    Ampulla     Post sphincterotomy anatomy    Pancreatic duct     Neither injected nor cannulated    CBD     Normal course and caliber     Therapeutics    Intersphincteric injection of Botox - 100U divided 4 quadrants    Hydraulic dilation of pylorus 18-19-20 mm balloon    10-11-12 mm hydraulic dilation of biliary ampulla    Balloon sweeps negative for stones or debris    Plan:  Follow liver enzymes    Resume preprocedure diet and medications    Follow-up in office      Procedure detail:    Prior to procedure, informed consent was obtained.  Risks, benefits, alternatives, including but not limited to risk of bleeding, infection, perforation, adverse reaction to sedating medicine, failure to identify pathology, pancreatitis  and death were explained to the patient who accepted all risks.    Patient was prepped in the prone position intubation and sedation was provided by anesthesia.    EGD  Scope tip of the Olympus flexible gastroscope was passed in the proximal esophagus.  Proximal middle and distal thirds of the esophagus were well visualized and were unremarkable.  The stomach was entered gastric pool was suctioned.  Air was insufflated.  Scope was retroflexed to view the body fundus and cardia then straightened to view the antrum and incisura.  The mucosa was unremarkable.  The pylorus was narrow caliber but was not obstructive.  The scope passed easily into the duodenal bulb.  The duodenal bulb sweep second and third portion were unremarkable.  Scope was withdrawn back into the gastric antrum and a sclerotherapy needle was passed down the scope channel 4 separate injections in 4 quadrants were made of Botox surrounding the pylorus.  This was a total of 100 units and the needle was then flushed with normal saline.  The needle was withdrawn and the gastroscope passed into the third portion of the duodenum.  Wire was passed into the duodenum and a wire-guided 18-19-20 mm balloon catheter was passed down the wire.  This was withdrawn across the ampulla and slowly inflated up to 20 mm and held in place for 1 minute.  Upon balloon deflation excellent dilation of the pylorus was observed.  The balloon was withdrawn and the gastroscope was withdrawn and we proceeded with endoscopic retrograde cholangiopancreatography.    ERCP  The flexible side-viewing TJF duodena scope was passed to the level of the biliary ampulla in the short position.  There was evidence of prior sphincterotomy.  There was no more room to extend sphincterotomy.  The ampulla did seem slightly stenotic.  I 9-12 mm occlusion balloon catheter was used for selective cannulation of the bile duct and this was achieved on the very first attempt without any manipulation of the  pancreatic duct.  Injection of contrast confirmed position.  Multiple filling defects were seen in the duct consistent with air but could not rule out stones.  With the wire left in place multiple balloon sweeps were made with a 12 mm balloon without delivery of stones or debris.  The balloon pulled through the ampulla with some effort.  The wire was left in place and the catheter removed and a 10-11-12 mm hydraulic balloon was exchanged over the wire across the ampulla and slowly inflated up to 12 mm with excellent stretching of the ampulla.  Excellent results were seen.  This was removed and once again the 12 mm occlusion balloon was exchanged over the wire and 3 additional balloon sweeps were made each negative for stones.  The balloon pulled with ease through the ampulla.  The procedure was deemed complete the scope was withdrawn.  Air and liquid were suctioned.  Patient tolerated the procedure well and was sent to recovery without immediate complications.      4/18/2022 11:23 AM Rivas Cadet M.D.

## 2022-04-18 NOTE — OR NURSING
1147: To PACU from EUS via gurerik, sleeping, respirations spontaneous and non-labored via OPA. Abdo soft, flat.   1149: Rouses spontaneously, denies pain/nausea.  1200: O2 d/val, commenced po water.  1215: No change. Meets criteria to transfer to Stage 2.

## 2022-04-18 NOTE — ANESTHESIA POSTPROCEDURE EVALUATION
Patient: Kathy Dumont    Procedure Summary     Date: 04/18/22 Room / Location:  ENDOSCOPIC ULTRASOUND ROOM / SURGERY [facility]    Anesthesia Start: 1045 Anesthesia Stop: 1152    Procedures:       GASTROSCOPY - WITH PYLORUS DILATION WITH BOTOX INJECTION (Mouth)      ERCP (ENDOSCOPIC RETROGRADE CHOLANGIOPANCREATOGRAPHY) (Mouth) Diagnosis: (ABNORMAL WEIGHT LOSS, ACUTE RECURRENT PANCREATITIS, CELIAC DISEASE)    Surgeons: Rivas Cadet M.D. Responsible Provider: Gerald Hanks M.D.    Anesthesia Type: general ASA Status: 2          Final Anesthesia Type: general  Last vitals  BP   Blood Pressure : 114/53    Temp   36.8 °C (98.2 °F)    Pulse   67   Resp   12    SpO2   100 %      Anesthesia Post Evaluation    Patient location during evaluation: PACU  Patient participation: complete - patient participated  Level of consciousness: awake and alert  Pain score: 4    Airway patency: patent  Anesthetic complications: no  Cardiovascular status: hemodynamically stable  Respiratory status: acceptable  Hydration status: euvolemic    PONV: none          There were no known complications for this encounter.     Nurse Pain Score: 4 (NPRS)

## 2022-04-18 NOTE — ANESTHESIA TIME REPORT
Anesthesia Start and Stop Event Times     Date Time Event    4/18/2022 1021 Ready for Procedure     1045 Anesthesia Start     1152 Anesthesia Stop        Responsible Staff  04/18/22    Name Role Begin End    Gerald Hanks M.D. Anesth 1045 1152        Overtime Reason:  no overtime (within assigned shift)    Comments:

## 2022-04-18 NOTE — OR NURSING
1220 PT RECEIVED IN STAGE 2, REPORT RECEIVED.  PT DRESSED, TRANSFERRED TO RECLINER.      1248 PT MEETS CRITERIA FOR D/C TO HOME.

## 2023-04-14 NOTE — PROGRESS NOTES
Subjective:   5/2/2023  9:16 AM  Primary care physician: Pcp Pt States None  Referring Provider: Femi Gutierrez M.D.    Chief Complaint: Abdominal pain, biliary colic, elevated liver enzymes    Diagnosis:   1. Delayed gastric emptying  CT-ABDOMEN WITH & W/O    CANCELED: CT-ABDOMEN WITH & W/O      2. Biliary colic  CT-ABDOMEN WITH & W/O    CANCELED: CT-ABDOMEN WITH & W/O      3. Chronic pancreatitis, unspecified pancreatitis type (HCC)  CT-ABDOMEN WITH & W/O    CANCELED: CT-ABDOMEN WITH & W/O      4. Anorexia nervosa        5. Underweight due to inadequate caloric intake        6. Partial gastric outlet obstruction          History of presenting illness:    Kathy Dumont is a pleasant 67 y.o. female with hx of anorexia nervosa, celiac disease(?), idiopathic gastroparesis, and idiopathic recurrent pancreatitis. She has a long GI history and was previously seen at [facility] and at  in [place], [place]. Her latest weight is 29 kg, which is a slight decrease from December. She has previous attempted numerous medications including Reglan, Creon, Linzess, Domporidon. Her diet consists mostly of jello, baby food, ice cream, Boost drinks. She previously tried working with a nurtitionist - but had no clear results.    GASTRIC EMPTYING STUDY on 7/20/21 noted significant retained gastric activity at 4 hours indicating delayed gastric emptying.    EGD on 8/9/21 noted   EGD FINDINGS:  1.  Esophagus:  Unremarkable mucosa.  2.  Long and J-shaped stomach, otherwise unremarkable mucosa.  3.  Mildly stenotic pylorus, successfully dilated to 20 mm with balloon.  4.  Duodenum:  Unremarkable mucosa.  Possible extrinsic compression of the second portion.     ENDOSCOPIC ULTRASOUND FINDINGS:  1.  Celiac axis, no adenopathy.  2.  Pancreatic body and tail, normal parenchyma.  3.  Pancreatic duct, normal course and caliber.  4.  Gallbladder, surgically absent.  5.  Biliary ampulla, unremarkable.  6.  Common bile duct, generous caliber down  to the level of the ampulla.  Normal course.  Small subtle nonshadowing filling defect suspicious of stone versus sludge ball.  7.  Head of pancreas, normal parenchyma and normal dorsal ventral transition.     ERCP FINDINGS:  1.  Biliary ampulla, small, but otherwise unremarkable appearance.  Stenotic to wire and cannula passage.  2.  Pancreatic duct, neither injected nor cannulated.  3.  Common bile duct, normal course.  Generous caliber.  Small distal filling defect consistent with small stone.    EGD by Dr Cadet on 4/18/23 noted unremarkable mucosa, and included intersphincteric injection of Botox - 100U divided 4 quadrants. Hydraulic dilation of pylorus 18-19-20 mm balloon 10-11-12 mm hydraulic dilation of biliary ampulla.    Updated NM GASTRIC EMPTYING study on 4/27/23 noted gastric emptying halftime measured at 204 minutes consistent with delayed gastric emptying. This previously measured approximately 223.5 minutes    She is here today for evaluation for what appears to be chronic gastric outlet obstruction from hypertrophic pylorus, gastroparesis, history of anorexia, and severe malnutrition.  The patient's BMI is 11.21 and she only weighs 29 kg.  This has occurred over the last 4 to 5 years.  She did have a history of anorexia but that resolved over 12 years ago.  The patient appears very cachectic.  She is alert and oriented x3 and very pleasant.  The patient has been scope by gastroenterology multiple times and has had multiple injections of Botox into the pylorus with pyloric dilations.  She states that last for about 2 weeks and then she can eat again.  She is presently on liquids only and continues to lose weight.  We did send her for an updated gastric emptying study and it is positive with delayed of 204 minutes.  She is not taking any narcotics or any type of motility inhibitor.  She is here with her  discuss neck steps.  We have no imaging.  What is also unusual as the patient has  suffered multiple bouts of pancreatitis.  She has had some common duct stones that were extracted 2 to 3 years ago and ever since then she has had intermittent pancreatitis.  We have no recent imaging of her pancreas.  She has no family history of malignancies, she is never had a malignancy.  She had an open cholecystectomy when she was 11 years old.  It is unclear the reason why.  She is here with her  to discuss what options she has.    I have reviewed the notes from GI, and other available records.  Past Medical History:   Diagnosis Date    Acute recurrent pancreatitis 08/2021    Anesthesia     PONV    Arthritis 08/2021    fingers    Disorder of thyroid     hypothyroid    Gastroparesis 08/2021    Gluten intolerance     High cholesterol     PONV (postoperative nausea and vomiting)      Past Surgical History:   Procedure Laterality Date    [place] UPPER GI ENDOSCOPY,DIAGNOSIS  4/18/2022    Procedure: GASTROSCOPY - WITH PYLORUS DILATION WITH BOTOX INJECTION;  Surgeon: Rivas Cadet M.D.;  Location: [facility];  Service: Gastroenterology    [place] ERCP,DIAGNOSTIC  4/18/2022    Procedure: ERCP (ENDOSCOPIC RETROGRADE CHOLANGIOPANCREATOGRAPHY);  Surgeon: Rivas Cadet M.D.;  Location: [facility];  Service: Gastroenterology    [place] UPPER GI ENDOSCOPY,DIAGNOSIS N/A 8/9/2021    Procedure: GASTROSCOPY;  Surgeon: Rivas Cadet M.D.;  Location: [facility];  Service: EUS    [place] ERCP,DIAGNOSTIC N/A 8/9/2021    Procedure: ERCP, DIAGNOSTIC;  Surgeon: Rivas Cadet M.D.;  Location: [facility];  Service: EUS    EGD W/ENDOSCOPIC ULTRASOUND N/A 8/9/2021    Procedure: EGD, WITH ENDOSCOPIC US - UPPER RADIAL;  Surgeon: Rivas Cadet M.D.;  Location: [facility];  Service: EUS    ORIF, HUMERUS Left 2006    KNEE ARTHROSCOPY Left 1973    CHOLECYSTECTOMY  1963    APPENDECTOMY CHILD      PRIMARY C SECTION  1984, 1987, 1990     Allergies   Allergen  "Reactions    Hydrocodone-Acetaminophen Rash     Rash, \"I can tolerate in small amounts\"      Loratadine-Pseudoephedrine Rash     rash    Sertraline Hcl Palpitations    Tramadol Vomiting     Fainting    Zoloft Rash     rash    Codeine Vomiting    Gluten Meal      \"violent stomach upset for 4-5 days\"     Outpatient Encounter Medications as of 5/2/2023   Medication Sig Dispense Refill    Multiple Vitamins-Minerals (IMMUNE SUPPORT) Chew Tab Chew every day.      levothyroxine (SYNTHROID) 50 MCG Tab Take 50 mcg by mouth every morning on an empty stomach.      HYDROcodone-acetaminophen (NORCO) 5-325 MG Tab per tablet Take 1 tablet by mouth every four hours as needed.      Multiple Vitamins-Minerals (MULTIVITAMIN ADULT) Chew Tab Chew every day.      Calcium Carb-Cholecalciferol (CALCIUM + VITAMIN D3) 500-400 MG-UNIT Chew Tab Chew every day.       No facility-administered encounter medications on file as of 5/2/2023.     Social History     Socioeconomic History    Marital status:      Spouse name: Not on file    Number of children: Not on file    Years of education: Not on file    Highest education level: Not on file   Occupational History    Not on file   Tobacco Use    Smoking status: Never    Smokeless tobacco: Never   Vaping Use    Vaping Use: Never used   Substance and Sexual Activity    Alcohol use: Never    Drug use: Yes     Types: Oral     Comment: cbd/thc, daily for pain    Sexual activity: Not on file   Other Topics Concern    Not on file   Social History Narrative    Not on file     Social Determinants of Health     Financial Resource Strain: Not on file   Food Insecurity: Not on file   Transportation Needs: Not on file   Physical Activity: Not on file   Stress: Not on file   Social Connections: Not on file   Intimate Partner Violence: Not on file   Housing Stability: Not on file      Social History     Tobacco Use   Smoking Status Never   Smokeless Tobacco Never     Social History     Substance and Sexual " "Activity   Alcohol Use Never     Social History     Substance and Sexual Activity   Drug Use Yes    Types: Oral    Comment: cbd/thc, daily for pain      History reviewed. No pertinent family history.    Review of Systems   Constitutional:  Positive for malaise/fatigue and weight loss. Negative for chills and fever.   Respiratory:  Negative for cough.    Cardiovascular:  Negative for chest pain.   Gastrointestinal:         Difficulty eating   All other systems reviewed and are negative.     Objective:   BP (!) 84/50 (BP Location: Left arm, Patient Position: Sitting, BP Cuff Size: Small adult)   Pulse (!) 59   Temp 37.2 °C (99 °F) (Temporal)   Ht 1.6 m (5' 3\")   Wt 28.7 kg (63 lb 4.8 oz)   SpO2 97%   BMI 11.21 kg/m²     Physical Exam  Vitals and nursing note reviewed.   Constitutional:       Appearance: She is ill-appearing.      Comments: Extremely cachectic and thin due to gastric outlet obstruction   HENT:      Nose: Nose normal.      Mouth/Throat:      Mouth: Mucous membranes are moist.      Pharynx: Oropharynx is clear.   Eyes:      Extraocular Movements: Extraocular movements intact.      Conjunctiva/sclera: Conjunctivae normal.      Pupils: Pupils are equal, round, and reactive to light.   Cardiovascular:      Rate and Rhythm: Normal rate and regular rhythm.      Pulses: Normal pulses.      Heart sounds: Normal heart sounds.   Pulmonary:      Effort: Pulmonary effort is normal.      Breath sounds: Normal breath sounds.   Abdominal:      General: Abdomen is flat. Bowel sounds are normal.      Palpations: Abdomen is soft.      Comments: Minimal to no distention, well-healed right upper quadrant scar from open cholecystectomy.   Musculoskeletal:         General: Normal range of motion.      Cervical back: Normal range of motion and neck supple.   Skin:     General: Skin is warm.   Neurological:      General: No focal deficit present.      Mental Status: She is alert and oriented to person, place, and time. "   Psychiatric:         Mood and Affect: Mood normal.         Behavior: Behavior normal.         Thought Content: Thought content normal.         Judgment: Judgment normal.       Labs   Latest Reference Range & Units 03/02/21 11:10   WBC 4.0 - 11.0 K/uL 4.0 (E)   Hemoglobin 11.7 - 15.5 g/dL 12.1 (E)   Hematocrit 35.0 - 47.0 % 35.3 (E)   MCV 80 - 100 fL 92 (E)   MCH 27.0 - 33.0 pg 31.7 (E)   MCHC 31.0 - 36.0 g/dL 34.3 (E)   RDW <16.4 % 11.4 (E)   Platelet Count 150 - 400 K/uL 226 (E)   (E): External lab result     Latest Reference Range & Units 03/02/21 11:10   Sodium 136 - 145 mmol/L 141 (E)   Potassium 3.5 - 5.1 mmol/L 3.7 (E)   Chloride 98 - 107 mmol/L 107 (E)   Co2 21 - 32 mmol/L 28 (E)   Glucose 70 - 100 mg/dL 93 (E)   Bun 6 - 25 mg/dL 18 (E)   Creatinine 0.40 - 1.00 mg/dL 0.74 (E)   GFR If  >60 See Cmnt 99 (E)   GFR If Non  >60 See Cmnt 85 (E)   Calcium 8.2 - 10.2 mg/dL 9.5 (E)   AST(SGOT) 0 - 37 U/L 26 (E)   ALT(SGPT) 15 - 65 U/L 31 (E)   Alkaline Phosphatase 26 - 137 U/L 72 (E)   Total Bilirubin 0.0 - 1.1 mg/dL 0.8 (E)   Total Protein 6.4 - 8.2 g/dL 7.1 (E)   (E): External lab result    Imaging  NM GASTRIC EMPTYING STUDY (4/27/23)  IMPRESSION:  Gastric emptying halftime measured at 204 minutes consistent with delayed gastric emptying. This previously measured approximately 223.5 minutes      NM GASTRIC EMPTYING STUDY (7/20/21)  FINDINGS:     After observation the activity remaining in the stomach is as follows:     Immediately:  100%     1 hour:  68%     2 hours:   59%     4 hours:   47%     IMPRESSION:     Significant retained gastric activity at 4 hours indicating delayed gastric emptying.    Pathology  N/A    Procedures  PROCEDURE (4/18/23)  GASTROSCOPY - WITH PYLORUS DILATION WITH BOTOX INJECTION - Wound Class: Clean Contaminated  ERCP (ENDOSCOPIC RETROGRADE CHOLANGIOPANCREATOGRAPHY) - Wound Class: Clean Contaminated    Findings:              EGD                           Esophagus                                      Unremarkable mucosa                          Stomach                                      Unremarkable mucosa                                      Narrow caliber pylorus                          Duodenum                                      Unremarkable mucosa                 ERCP                          Ampulla                                      Post sphincterotomy anatomy                          Pancreatic duct                                      Neither injected nor cannulated                          CBD                                      Normal course and caliber                 Therapeutics                          Intersphincteric injection of Botox - 100U divided 4 quadrants                          Hydraulic dilation of pylorus 18-19-20 mm balloon                          10-11-12 mm hydraulic dilation of biliary ampulla                          Balloon sweeps negative for stones or debris    PROCEDURE (8/9/21)  1.  Esophagogastroduodenoscopy with hydraulic dilation of gastric outlet.  2.  Endoscopic ultrasound, upper.  3.  Endoscopic retrograde cholangiopancreatography with biliary   sphincterotomy, bile duct balloon soft stone debris extraction, radiologic   interpretation of static and dynamic fluoroscopic images by myself, at no time   was a radiologist present in the room.    EGD FINDINGS:  1.  Esophagus:  Unremarkable mucosa.  2.  Long and J-shaped stomach, otherwise unremarkable mucosa.  3.  Mildly stenotic pylorus, successfully dilated to 20 mm with balloon.  4.  Duodenum:  Unremarkable mucosa.  Possible extrinsic compression of the   second portion.     ENDOSCOPIC ULTRASOUND FINDINGS:  1.  Celiac axis, no adenopathy.  2.  Pancreatic body and tail, normal parenchyma.  3.  Pancreatic duct, normal course and caliber.  4.  Gallbladder, surgically absent.  5.  Biliary ampulla, unremarkable.  6.  Common bile duct, generous caliber down to the level of  the ampulla.    Normal course.  Small subtle nonshadowing filling defect suspicious of stone   versus sludge ball.  7.  Head of pancreas, normal parenchyma and normal dorsal ventral transition.     ERCP FINDINGS:  1.  Biliary ampulla, small, but otherwise unremarkable appearance.  Stenotic   to wire and cannula passage.  2.  Pancreatic duct, neither injected nor cannulated.  3.  Common bile duct, normal course.  Generous caliber.  Small distal filling   defect consistent with small stone.    Diagnosis:     1. Delayed gastric emptying  CT-ABDOMEN WITH & W/O    CANCELED: CT-ABDOMEN WITH & W/O      2. Biliary colic  CT-ABDOMEN WITH & W/O    CANCELED: CT-ABDOMEN WITH & W/O      3. Chronic pancreatitis, unspecified pancreatitis type (HCC)  CT-ABDOMEN WITH & W/O    CANCELED: CT-ABDOMEN WITH & W/O      4. Anorexia nervosa        5. Underweight due to inadequate caloric intake        6. Partial gastric outlet obstruction            Medical Decision Making:  Today's Assessment / Status / Plan:     In light of the present findings, the patient has an obvious gastric outlet obstruction which causes her severe gastroparesis.  She has had multiple injections of Botox that were unsuccessful.  Because of this I am recommending the gold standard which would be a Sinai-en-Y gastric bypass in order to give her a complete different opening to her stomach.  This will allow passage and eating of solid food.  She is down to 29 kilograms with a BMI of 11.21.  I do not feel she will survive over the next year if she does not get her nutrition back up.  We discussed the risks and benefits of the procedure including bleeding, infection, the possibility of leak from the anastomosis of 1 to 2% and a hospital stay between 3 and 5 days.  Because of her history of multiple pancreatitis as well I am recommending a pancreas protocol CT scan to confirm that we are not missing a malignancy or gastric outlet obstruction related to his pseudocyst.  We  have ordered a stat pancreas protocol CT scan.  She will call me after it has been completed.    I, Dr Cisneros, have entered, reviewed and confirmed the above diagnoses related to this patient on this date of service, as per the time and date noted at top of this note.     I, Dr. Cisneros have entered, reviewed and confirmed the above diagnoses related to this patient on this date of service, 5/2/2023  9:16 AM.    She agreed and verbalized her agreement and understanding with the current plan. I answered all questions and concerns she has at this time and advised her to call at any time in the interim with questions or concerns in regards to her care.    Thank you for allowing me to participate in her care, I will continue to follow closely.       Please note that this dictation was created using voice recognition software. I have made every reasonable attempt to correct obvious errors, but I expect that there are errors of grammar and possibly content that I did not discover before finalizing the note.     Thank you for this consultation and allowing me to participate in your patient's care. If I can be of further service please contact my office.

## 2023-04-18 DIAGNOSIS — K31.84 GASTROPARESIS: ICD-10-CM

## 2023-04-18 DIAGNOSIS — K86.1 CHRONIC PANCREATITIS, UNSPECIFIED PANCREATITIS TYPE (HCC): ICD-10-CM

## 2023-04-18 PROBLEM — F50.9 EATING DISORDER: Status: ACTIVE | Noted: 2023-04-18

## 2023-04-18 PROBLEM — R63.0 ANOREXIA: Status: ACTIVE | Noted: 2019-02-21

## 2023-04-18 PROBLEM — E03.9 HYPOTHYROIDISM: Status: ACTIVE | Noted: 2023-04-18

## 2023-04-18 PROBLEM — S72.424A: Status: ACTIVE | Noted: 2019-07-18

## 2023-04-27 ENCOUNTER — HOSPITAL ENCOUNTER (OUTPATIENT)
Dept: RADIOLOGY | Facility: MEDICAL CENTER | Age: 68
End: 2023-04-27
Attending: NURSE PRACTITIONER
Payer: MEDICARE

## 2023-04-27 DIAGNOSIS — K86.1 CHRONIC PANCREATITIS, UNSPECIFIED PANCREATITIS TYPE (HCC): ICD-10-CM

## 2023-04-27 DIAGNOSIS — K31.84 GASTROPARESIS: ICD-10-CM

## 2023-04-27 PROCEDURE — A9541 TC99M SULFUR COLLOID: HCPCS

## 2023-05-02 ENCOUNTER — OFFICE VISIT (OUTPATIENT)
Dept: SURGICAL ONCOLOGY | Facility: MEDICAL CENTER | Age: 68
End: 2023-05-02
Payer: MEDICARE

## 2023-05-02 ENCOUNTER — APPOINTMENT (OUTPATIENT)
Dept: ADMISSIONS | Facility: MEDICAL CENTER | Age: 68
DRG: 326 | End: 2023-05-02
Attending: SURGERY
Payer: MEDICARE

## 2023-05-02 VITALS
HEIGHT: 63 IN | OXYGEN SATURATION: 97 % | DIASTOLIC BLOOD PRESSURE: 50 MMHG | HEART RATE: 59 BPM | BODY MASS INDEX: 11.21 KG/M2 | TEMPERATURE: 99 F | WEIGHT: 63.3 LBS | SYSTOLIC BLOOD PRESSURE: 84 MMHG

## 2023-05-02 DIAGNOSIS — K30 DELAYED GASTRIC EMPTYING: ICD-10-CM

## 2023-05-02 DIAGNOSIS — R63.6 UNDERWEIGHT DUE TO INADEQUATE CALORIC INTAKE: ICD-10-CM

## 2023-05-02 DIAGNOSIS — K86.1 CHRONIC PANCREATITIS, UNSPECIFIED PANCREATITIS TYPE (HCC): ICD-10-CM

## 2023-05-02 DIAGNOSIS — F50.00 ANOREXIA NERVOSA: ICD-10-CM

## 2023-05-02 DIAGNOSIS — K80.50 BILIARY COLIC: Chronic | ICD-10-CM

## 2023-05-02 DIAGNOSIS — K31.1 PARTIAL GASTRIC OUTLET OBSTRUCTION: ICD-10-CM

## 2023-05-02 PROCEDURE — 99205 OFFICE O/P NEW HI 60 MIN: CPT | Performed by: SURGERY

## 2023-05-02 ASSESSMENT — ENCOUNTER SYMPTOMS
WEIGHT LOSS: 1
COUGH: 0
CHILLS: 0
ROS GI COMMENTS: DIFFICULTY EATING
FEVER: 0

## 2023-05-03 ENCOUNTER — HOSPITAL ENCOUNTER (OUTPATIENT)
Dept: RADIOLOGY | Facility: MEDICAL CENTER | Age: 68
DRG: 326 | End: 2023-05-03
Attending: NURSE PRACTITIONER
Payer: MEDICARE

## 2023-05-03 DIAGNOSIS — K30 DELAYED GASTRIC EMPTYING: ICD-10-CM

## 2023-05-03 DIAGNOSIS — K80.50 BILIARY COLIC: ICD-10-CM

## 2023-05-03 DIAGNOSIS — K86.1 CHRONIC PANCREATITIS, UNSPECIFIED PANCREATITIS TYPE (HCC): ICD-10-CM

## 2023-05-03 PROCEDURE — 74170 CT ABD WO CNTRST FLWD CNTRST: CPT

## 2023-05-03 PROCEDURE — 700117 HCHG RX CONTRAST REV CODE 255: Performed by: NURSE PRACTITIONER

## 2023-05-03 RX ADMIN — IOHEXOL 80 ML: 350 INJECTION, SOLUTION INTRAVENOUS at 17:00

## 2023-05-04 ENCOUNTER — PRE-ADMISSION TESTING (OUTPATIENT)
Dept: ADMISSIONS | Facility: MEDICAL CENTER | Age: 68
End: 2023-05-04
Payer: MEDICARE

## 2023-05-04 VITALS — HEIGHT: 63 IN | BODY MASS INDEX: 11.21 KG/M2

## 2023-05-04 RX ORDER — ONDANSETRON 4 MG/1
4 TABLET, FILM COATED ORAL EVERY 8 HOURS PRN
COMMUNITY

## 2023-05-05 ENCOUNTER — ANESTHESIA (OUTPATIENT)
Dept: ANESTHESIOLOGY | Facility: MEDICAL CENTER | Age: 68
DRG: 326 | End: 2023-05-05
Payer: MEDICARE

## 2023-05-05 ENCOUNTER — HOSPITAL ENCOUNTER (INPATIENT)
Facility: MEDICAL CENTER | Age: 68
LOS: 6 days | DRG: 326 | End: 2023-05-11
Attending: SURGERY | Admitting: SURGERY
Payer: MEDICARE

## 2023-05-05 ENCOUNTER — ANESTHESIA (OUTPATIENT)
Dept: SURGERY | Facility: MEDICAL CENTER | Age: 68
DRG: 326 | End: 2023-05-05
Payer: MEDICARE

## 2023-05-05 ENCOUNTER — ANESTHESIA EVENT (OUTPATIENT)
Dept: SURGERY | Facility: MEDICAL CENTER | Age: 68
DRG: 326 | End: 2023-05-05
Payer: MEDICARE

## 2023-05-05 DIAGNOSIS — K31.1 GASTRIC OUTLET OBSTRUCTION: ICD-10-CM

## 2023-05-05 DIAGNOSIS — Z98.890 S/P EXPLORATORY LAPAROTOMY: ICD-10-CM

## 2023-05-05 PROBLEM — E43 SEVERE PROTEIN-CALORIE MALNUTRITION (HCC): Status: ACTIVE | Noted: 2023-05-05

## 2023-05-05 LAB
ANION GAP SERPL CALC-SCNC: 11 MMOL/L (ref 7–16)
BUN SERPL-MCNC: 15 MG/DL (ref 8–22)
CALCIUM SERPL-MCNC: 9.2 MG/DL (ref 8.5–10.5)
CHLORIDE SERPL-SCNC: 103 MMOL/L (ref 96–112)
CO2 SERPL-SCNC: 27 MMOL/L (ref 20–33)
CREAT SERPL-MCNC: 0.62 MG/DL (ref 0.5–1.4)
EKG IMPRESSION: NORMAL
ERYTHROCYTE [DISTWIDTH] IN BLOOD BY AUTOMATED COUNT: 43.5 FL (ref 35.9–50)
GFR SERPLBLD CREATININE-BSD FMLA CKD-EPI: 97 ML/MIN/1.73 M 2
GLUCOSE SERPL-MCNC: 96 MG/DL (ref 65–99)
HCT VFR BLD AUTO: 39.1 % (ref 37–47)
HGB BLD-MCNC: 12.9 G/DL (ref 12–16)
MCH RBC QN AUTO: 32.1 PG (ref 27–33)
MCHC RBC AUTO-ENTMCNC: 33 G/DL (ref 33.6–35)
MCV RBC AUTO: 97.3 FL (ref 81.4–97.8)
PLATELET # BLD AUTO: 235 K/UL (ref 164–446)
PMV BLD AUTO: 9.4 FL (ref 9–12.9)
POTASSIUM SERPL-SCNC: 3.7 MMOL/L (ref 3.6–5.5)
RBC # BLD AUTO: 4.02 M/UL (ref 4.2–5.4)
SODIUM SERPL-SCNC: 141 MMOL/L (ref 135–145)
WBC # BLD AUTO: 4.4 K/UL (ref 4.8–10.8)

## 2023-05-05 PROCEDURE — 700101 HCHG RX REV CODE 250: Performed by: SURGERY

## 2023-05-05 PROCEDURE — 700111 HCHG RX REV CODE 636 W/ 250 OVERRIDE (IP): Performed by: ANESTHESIOLOGY

## 2023-05-05 PROCEDURE — 0D160ZA BYPASS STOMACH TO JEJUNUM, OPEN APPROACH: ICD-10-PCS | Performed by: SURGERY

## 2023-05-05 PROCEDURE — 160048 HCHG OR STATISTICAL LEVEL 1-5: Performed by: SURGERY

## 2023-05-05 PROCEDURE — 62320 NJX INTERLAMINAR CRV/THRC: CPT | Performed by: SURGERY

## 2023-05-05 PROCEDURE — 80048 BASIC METABOLIC PNL TOTAL CA: CPT

## 2023-05-05 PROCEDURE — 85027 COMPLETE CBC AUTOMATED: CPT

## 2023-05-05 PROCEDURE — 700105 HCHG RX REV CODE 258: Performed by: ANESTHESIOLOGY

## 2023-05-05 PROCEDURE — 160035 HCHG PACU - 1ST 60 MINS PHASE I: Performed by: SURGERY

## 2023-05-05 PROCEDURE — 160009 HCHG ANES TIME/MIN: Performed by: SURGERY

## 2023-05-05 PROCEDURE — 43820 GASTROJEJUNOSTOMY WO VAGOTMY: CPT | Performed by: SURGERY

## 2023-05-05 PROCEDURE — 93010 ELECTROCARDIOGRAM REPORT: CPT | Performed by: INTERNAL MEDICINE

## 2023-05-05 PROCEDURE — 770001 HCHG ROOM/CARE - MED/SURG/GYN PRIV*

## 2023-05-05 PROCEDURE — 160002 HCHG RECOVERY MINUTES (STAT): Performed by: SURGERY

## 2023-05-05 PROCEDURE — 62324 NJX INTERLAMINAR CRV/THRC: CPT | Mod: 59 | Performed by: ANESTHESIOLOGY

## 2023-05-05 PROCEDURE — 160029 HCHG SURGERY MINUTES - 1ST 30 MINS LEVEL 4: Performed by: SURGERY

## 2023-05-05 PROCEDURE — 160036 HCHG PACU - EA ADDL 30 MINS PHASE I: Performed by: SURGERY

## 2023-05-05 PROCEDURE — 700101 HCHG RX REV CODE 250: Performed by: ANESTHESIOLOGY

## 2023-05-05 PROCEDURE — 700111 HCHG RX REV CODE 636 W/ 250 OVERRIDE (IP): Performed by: SURGERY

## 2023-05-05 PROCEDURE — C9113 INJ PANTOPRAZOLE SODIUM, VIA: HCPCS | Performed by: SURGERY

## 2023-05-05 PROCEDURE — 93005 ELECTROCARDIOGRAM TRACING: CPT | Performed by: SURGERY

## 2023-05-05 PROCEDURE — A9270 NON-COVERED ITEM OR SERVICE: HCPCS | Performed by: ANESTHESIOLOGY

## 2023-05-05 PROCEDURE — 36415 COLL VENOUS BLD VENIPUNCTURE: CPT

## 2023-05-05 PROCEDURE — 700105 HCHG RX REV CODE 258: Performed by: SURGERY

## 2023-05-05 PROCEDURE — 00797 ANES IPER UPR ABD GSTR PX MO: CPT | Performed by: ANESTHESIOLOGY

## 2023-05-05 PROCEDURE — C1729 CATH, DRAINAGE: HCPCS | Performed by: SURGERY

## 2023-05-05 PROCEDURE — 700102 HCHG RX REV CODE 250 W/ 637 OVERRIDE(OP): Performed by: ANESTHESIOLOGY

## 2023-05-05 PROCEDURE — 160041 HCHG SURGERY MINUTES - EA ADDL 1 MIN LEVEL 4: Performed by: SURGERY

## 2023-05-05 PROCEDURE — 43820 GASTROJEJUNOSTOMY WO VAGOTMY: CPT | Mod: AS | Performed by: SPECIALIST

## 2023-05-05 RX ORDER — OXYCODONE HCL 5 MG/5 ML
5 SOLUTION, ORAL ORAL
Status: DISCONTINUED | OUTPATIENT
Start: 2023-05-05 | End: 2023-05-05 | Stop reason: HOSPADM

## 2023-05-05 RX ORDER — SCOLOPAMINE TRANSDERMAL SYSTEM 1 MG/1
1 PATCH, EXTENDED RELEASE TRANSDERMAL
Status: DISCONTINUED | OUTPATIENT
Start: 2023-05-05 | End: 2023-05-05

## 2023-05-05 RX ORDER — SODIUM CHLORIDE, SODIUM LACTATE, POTASSIUM CHLORIDE, CALCIUM CHLORIDE 600; 310; 30; 20 MG/100ML; MG/100ML; MG/100ML; MG/100ML
INJECTION, SOLUTION INTRAVENOUS CONTINUOUS
Status: DISCONTINUED | OUTPATIENT
Start: 2023-05-05 | End: 2023-05-05

## 2023-05-05 RX ORDER — ACETAMINOPHEN 10 MG/ML
INJECTION, SOLUTION INTRAVENOUS PRN
Status: DISCONTINUED | OUTPATIENT
Start: 2023-05-05 | End: 2023-05-05 | Stop reason: SURG

## 2023-05-05 RX ORDER — SODIUM CHLORIDE 9 MG/ML
250 INJECTION, SOLUTION INTRAVENOUS ONCE
Status: COMPLETED | OUTPATIENT
Start: 2023-05-05 | End: 2023-05-05

## 2023-05-05 RX ORDER — ACETAMINOPHEN 10 MG/ML
INJECTION, SOLUTION INTRAVENOUS
Status: COMPLETED
Start: 2023-05-05 | End: 2023-05-05

## 2023-05-05 RX ORDER — HALOPERIDOL 5 MG/ML
1 INJECTION INTRAMUSCULAR
Status: DISCONTINUED | OUTPATIENT
Start: 2023-05-05 | End: 2023-05-05 | Stop reason: HOSPADM

## 2023-05-05 RX ORDER — CELECOXIB 100 MG/1
100 CAPSULE ORAL 2 TIMES DAILY WITH MEALS
Status: DISCONTINUED | OUTPATIENT
Start: 2023-05-05 | End: 2023-05-05

## 2023-05-05 RX ORDER — ACETAMINOPHEN 160 MG/5ML
15 SUSPENSION ORAL EVERY 6 HOURS
Status: DISCONTINUED | OUTPATIENT
Start: 2023-05-05 | End: 2023-05-11 | Stop reason: HOSPADM

## 2023-05-05 RX ORDER — DIPHENHYDRAMINE HYDROCHLORIDE 50 MG/ML
12.5 INJECTION INTRAMUSCULAR; INTRAVENOUS EVERY 6 HOURS PRN
Status: ACTIVE | OUTPATIENT
Start: 2023-05-05 | End: 2023-05-06

## 2023-05-05 RX ORDER — MORPHINE SULFATE 0.5 MG/ML
INJECTION, SOLUTION EPIDURAL; INTRATHECAL; INTRAVENOUS PRN
Status: DISCONTINUED | OUTPATIENT
Start: 2023-05-05 | End: 2023-05-05 | Stop reason: SURG

## 2023-05-05 RX ORDER — MIDAZOLAM HYDROCHLORIDE 1 MG/ML
INJECTION INTRAMUSCULAR; INTRAVENOUS PRN
Status: DISCONTINUED | OUTPATIENT
Start: 2023-05-05 | End: 2023-05-05 | Stop reason: SURG

## 2023-05-05 RX ORDER — SODIUM CHLORIDE, SODIUM LACTATE, POTASSIUM CHLORIDE, CALCIUM CHLORIDE 600; 310; 30; 20 MG/100ML; MG/100ML; MG/100ML; MG/100ML
INJECTION, SOLUTION INTRAVENOUS
Status: DISCONTINUED | OUTPATIENT
Start: 2023-05-05 | End: 2023-05-05 | Stop reason: SURG

## 2023-05-05 RX ORDER — DIPHENHYDRAMINE HYDROCHLORIDE 50 MG/ML
12.5 INJECTION INTRAMUSCULAR; INTRAVENOUS
Status: DISCONTINUED | OUTPATIENT
Start: 2023-05-05 | End: 2023-05-05 | Stop reason: HOSPADM

## 2023-05-05 RX ORDER — PANTOPRAZOLE SODIUM 40 MG/10ML
40 INJECTION, POWDER, LYOPHILIZED, FOR SOLUTION INTRAVENOUS DAILY
Status: DISCONTINUED | OUTPATIENT
Start: 2023-05-05 | End: 2023-05-08

## 2023-05-05 RX ORDER — SUCCINYLCHOLINE CHLORIDE 20 MG/ML
INJECTION INTRAMUSCULAR; INTRAVENOUS PRN
Status: DISCONTINUED | OUTPATIENT
Start: 2023-05-05 | End: 2023-05-05 | Stop reason: SURG

## 2023-05-05 RX ORDER — ONDANSETRON 2 MG/ML
INJECTION INTRAMUSCULAR; INTRAVENOUS PRN
Status: DISCONTINUED | OUTPATIENT
Start: 2023-05-05 | End: 2023-05-05 | Stop reason: SURG

## 2023-05-05 RX ORDER — ENOXAPARIN SODIUM 100 MG/ML
30 INJECTION SUBCUTANEOUS DAILY
Status: DISCONTINUED | OUTPATIENT
Start: 2023-05-06 | End: 2023-05-06

## 2023-05-05 RX ORDER — LIDOCAINE HYDROCHLORIDE 20 MG/ML
INJECTION, SOLUTION EPIDURAL; INFILTRATION; INTRACAUDAL; PERINEURAL PRN
Status: DISCONTINUED | OUTPATIENT
Start: 2023-05-05 | End: 2023-05-05 | Stop reason: SURG

## 2023-05-05 RX ORDER — ONDANSETRON 2 MG/ML
4 INJECTION INTRAMUSCULAR; INTRAVENOUS EVERY 4 HOURS PRN
Status: DISCONTINUED | OUTPATIENT
Start: 2023-05-05 | End: 2023-05-11 | Stop reason: HOSPADM

## 2023-05-05 RX ORDER — MAGNESIUM SULFATE HEPTAHYDRATE 40 MG/ML
INJECTION, SOLUTION INTRAVENOUS PRN
Status: DISCONTINUED | OUTPATIENT
Start: 2023-05-05 | End: 2023-05-05 | Stop reason: SURG

## 2023-05-05 RX ORDER — CEFOTETAN DISODIUM 2 G/20ML
INJECTION, POWDER, FOR SOLUTION INTRAMUSCULAR; INTRAVENOUS PRN
Status: DISCONTINUED | OUTPATIENT
Start: 2023-05-05 | End: 2023-05-05 | Stop reason: SURG

## 2023-05-05 RX ORDER — OXYCODONE HCL 5 MG/5 ML
2.5 SOLUTION, ORAL ORAL
Status: DISCONTINUED | OUTPATIENT
Start: 2023-05-05 | End: 2023-05-05 | Stop reason: HOSPADM

## 2023-05-05 RX ORDER — EPHEDRINE SULFATE 50 MG/ML
10 INJECTION, SOLUTION INTRAVENOUS
Status: ACTIVE | OUTPATIENT
Start: 2023-05-05 | End: 2023-05-06

## 2023-05-05 RX ORDER — ONDANSETRON 2 MG/ML
4 INJECTION INTRAMUSCULAR; INTRAVENOUS EVERY 6 HOURS PRN
Status: ACTIVE | OUTPATIENT
Start: 2023-05-05 | End: 2023-05-06

## 2023-05-05 RX ORDER — BUPIVACAINE HYDROCHLORIDE 2.5 MG/ML
INJECTION, SOLUTION EPIDURAL; INFILTRATION; INTRACAUDAL PRN
Status: DISCONTINUED | OUTPATIENT
Start: 2023-05-05 | End: 2023-05-05 | Stop reason: SURG

## 2023-05-05 RX ORDER — DEXAMETHASONE SODIUM PHOSPHATE 4 MG/ML
INJECTION, SOLUTION INTRA-ARTICULAR; INTRALESIONAL; INTRAMUSCULAR; INTRAVENOUS; SOFT TISSUE PRN
Status: DISCONTINUED | OUTPATIENT
Start: 2023-05-05 | End: 2023-05-05 | Stop reason: SURG

## 2023-05-05 RX ORDER — MIDAZOLAM HYDROCHLORIDE 1 MG/ML
1 INJECTION INTRAMUSCULAR; INTRAVENOUS
Status: DISCONTINUED | OUTPATIENT
Start: 2023-05-05 | End: 2023-05-05 | Stop reason: HOSPADM

## 2023-05-05 RX ORDER — SODIUM CHLORIDE, SODIUM LACTATE, POTASSIUM CHLORIDE, CALCIUM CHLORIDE 600; 310; 30; 20 MG/100ML; MG/100ML; MG/100ML; MG/100ML
INJECTION, SOLUTION INTRAVENOUS CONTINUOUS
Status: DISCONTINUED | OUTPATIENT
Start: 2023-05-05 | End: 2023-05-05 | Stop reason: HOSPADM

## 2023-05-05 RX ORDER — HALOPERIDOL 5 MG/ML
1 INJECTION INTRAMUSCULAR EVERY 6 HOURS PRN
Status: DISCONTINUED | OUTPATIENT
Start: 2023-05-05 | End: 2023-05-05

## 2023-05-05 RX ORDER — EPHEDRINE SULFATE 50 MG/ML
INJECTION, SOLUTION INTRAVENOUS PRN
Status: DISCONTINUED | OUTPATIENT
Start: 2023-05-05 | End: 2023-05-05 | Stop reason: SURG

## 2023-05-05 RX ORDER — SODIUM CHLORIDE, SODIUM LACTATE, POTASSIUM CHLORIDE, CALCIUM CHLORIDE 600; 310; 30; 20 MG/100ML; MG/100ML; MG/100ML; MG/100ML
INJECTION, SOLUTION INTRAVENOUS CONTINUOUS
Status: DISCONTINUED | OUTPATIENT
Start: 2023-05-05 | End: 2023-05-09

## 2023-05-05 RX ORDER — HYDRALAZINE HYDROCHLORIDE 20 MG/ML
5 INJECTION INTRAMUSCULAR; INTRAVENOUS
Status: DISCONTINUED | OUTPATIENT
Start: 2023-05-05 | End: 2023-05-05 | Stop reason: HOSPADM

## 2023-05-05 RX ORDER — DIPHENHYDRAMINE HYDROCHLORIDE 50 MG/ML
25 INJECTION INTRAMUSCULAR; INTRAVENOUS EVERY 6 HOURS PRN
Status: DISCONTINUED | OUTPATIENT
Start: 2023-05-05 | End: 2023-05-11 | Stop reason: HOSPADM

## 2023-05-05 RX ORDER — LIDOCAINE HYDROCHLORIDE AND EPINEPHRINE 15; 5 MG/ML; UG/ML
INJECTION, SOLUTION EPIDURAL
Status: COMPLETED | OUTPATIENT
Start: 2023-05-05 | End: 2023-05-05

## 2023-05-05 RX ORDER — DEXMEDETOMIDINE HYDROCHLORIDE 100 UG/ML
INJECTION, SOLUTION INTRAVENOUS PRN
Status: DISCONTINUED | OUTPATIENT
Start: 2023-05-05 | End: 2023-05-05 | Stop reason: SURG

## 2023-05-05 RX ORDER — SCOLOPAMINE TRANSDERMAL SYSTEM 1 MG/1
PATCH, EXTENDED RELEASE TRANSDERMAL
Status: DISCONTINUED
Start: 2023-05-05 | End: 2023-05-05

## 2023-05-05 RX ORDER — ROCURONIUM BROMIDE 10 MG/ML
INJECTION, SOLUTION INTRAVENOUS PRN
Status: DISCONTINUED | OUTPATIENT
Start: 2023-05-05 | End: 2023-05-05 | Stop reason: SURG

## 2023-05-05 RX ORDER — DEXAMETHASONE SODIUM PHOSPHATE 4 MG/ML
4 INJECTION, SOLUTION INTRA-ARTICULAR; INTRALESIONAL; INTRAMUSCULAR; INTRAVENOUS; SOFT TISSUE
Status: DISCONTINUED | OUTPATIENT
Start: 2023-05-05 | End: 2023-05-05

## 2023-05-05 RX ORDER — EPHEDRINE SULFATE 50 MG/ML
5 INJECTION, SOLUTION INTRAVENOUS
Status: DISCONTINUED | OUTPATIENT
Start: 2023-05-05 | End: 2023-05-05 | Stop reason: HOSPADM

## 2023-05-05 RX ORDER — KETOROLAC TROMETHAMINE 30 MG/ML
INJECTION, SOLUTION INTRAMUSCULAR; INTRAVENOUS PRN
Status: DISCONTINUED | OUTPATIENT
Start: 2023-05-05 | End: 2023-05-05 | Stop reason: SURG

## 2023-05-05 RX ADMIN — SUCCINYLCHOLINE CHLORIDE 30 MG: 20 INJECTION, SOLUTION INTRAMUSCULAR; INTRAVENOUS; PARENTERAL at 10:31

## 2023-05-05 RX ADMIN — HYDROMORPHONE HYDROCHLORIDE: 1 INJECTION, SOLUTION INTRAMUSCULAR; INTRAVENOUS; SUBCUTANEOUS at 13:26

## 2023-05-05 RX ADMIN — SUGAMMADEX 120 MG: 100 INJECTION, SOLUTION INTRAVENOUS at 11:31

## 2023-05-05 RX ADMIN — BUPIVACAINE HYDROCHLORIDE 3 ML: 2.5 INJECTION, SOLUTION EPIDURAL; INFILTRATION; INTRACAUDAL; PERINEURAL at 11:31

## 2023-05-05 RX ADMIN — MIDAZOLAM HYDROCHLORIDE 1 MG: 1 INJECTION, SOLUTION INTRAMUSCULAR; INTRAVENOUS at 10:15

## 2023-05-05 RX ADMIN — FENTANYL CITRATE 25 MCG: 50 INJECTION, SOLUTION INTRAMUSCULAR; INTRAVENOUS at 12:31

## 2023-05-05 RX ADMIN — BUPIVACAINE HYDROCHLORIDE 3 ML: 2.5 INJECTION, SOLUTION EPIDURAL; INFILTRATION; INTRACAUDAL; PERINEURAL at 10:50

## 2023-05-05 RX ADMIN — CEFOTETAN DISODIUM 1.2 G: 2 INJECTION, POWDER, FOR SOLUTION INTRAMUSCULAR; INTRAVENOUS at 10:35

## 2023-05-05 RX ADMIN — EPHEDRINE SULFATE 5 MG: 50 INJECTION, SOLUTION INTRAVENOUS at 10:35

## 2023-05-05 RX ADMIN — BUPIVACAINE HYDROCHLORIDE 3 ML: 2.5 INJECTION, SOLUTION EPIDURAL; INFILTRATION; INTRACAUDAL; PERINEURAL at 10:59

## 2023-05-05 RX ADMIN — DEXMEDETOMIDINE 10 MCG: 200 INJECTION, SOLUTION INTRAVENOUS at 11:32

## 2023-05-05 RX ADMIN — HYDROMORPHONE HYDROCHLORIDE: 1 INJECTION, SOLUTION INTRAMUSCULAR; INTRAVENOUS; SUBCUTANEOUS at 17:17

## 2023-05-05 RX ADMIN — MAGNESIUM SULFATE HEPTAHYDRATE 2 G: 40 INJECTION, SOLUTION INTRAVENOUS at 10:59

## 2023-05-05 RX ADMIN — CEFTRIAXONE SODIUM 1000 MG: 10 INJECTION, POWDER, FOR SOLUTION INTRAVENOUS at 15:23

## 2023-05-05 RX ADMIN — ONDANSETRON 4 MG: 2 INJECTION INTRAMUSCULAR; INTRAVENOUS at 11:31

## 2023-05-05 RX ADMIN — BUPIVACAINE HYDROCHLORIDE 3 ML: 2.5 INJECTION, SOLUTION EPIDURAL; INFILTRATION; INTRACAUDAL; PERINEURAL at 10:40

## 2023-05-05 RX ADMIN — SODIUM CHLORIDE, POTASSIUM CHLORIDE, SODIUM LACTATE AND CALCIUM CHLORIDE: 600; 310; 30; 20 INJECTION, SOLUTION INTRAVENOUS at 14:18

## 2023-05-05 RX ADMIN — LIDOCAINE HYDROCHLORIDE 40 MG: 20 INJECTION, SOLUTION EPIDURAL; INFILTRATION; INTRACAUDAL at 10:26

## 2023-05-05 RX ADMIN — BUPIVACAINE HYDROCHLORIDE 5 ML: 2.5 INJECTION, SOLUTION EPIDURAL; INFILTRATION; INTRACAUDAL; PERINEURAL at 13:25

## 2023-05-05 RX ADMIN — ACETAMINOPHEN 420 MG: 10 INJECTION INTRAVENOUS at 11:30

## 2023-05-05 RX ADMIN — SODIUM CHLORIDE 250 ML: 9 INJECTION, SOLUTION INTRAVENOUS at 22:24

## 2023-05-05 RX ADMIN — LIDOCAINE HYDROCHLORIDE,EPINEPHRINE BITARTRATE 3 ML: 15; .005 INJECTION, SOLUTION EPIDURAL; INFILTRATION; INTRACAUDAL; PERINEURAL at 10:18

## 2023-05-05 RX ADMIN — ROCURONIUM BROMIDE 10 MG: 10 INJECTION, SOLUTION INTRAVENOUS at 10:35

## 2023-05-05 RX ADMIN — MIDAZOLAM HYDROCHLORIDE 1 MG: 1 INJECTION, SOLUTION INTRAMUSCULAR; INTRAVENOUS at 10:11

## 2023-05-05 RX ADMIN — SODIUM CHLORIDE, POTASSIUM CHLORIDE, SODIUM LACTATE AND CALCIUM CHLORIDE: 600; 310; 30; 20 INJECTION, SOLUTION INTRAVENOUS at 08:39

## 2023-05-05 RX ADMIN — PROPOFOL 50 MG: 10 INJECTION, EMULSION INTRAVENOUS at 10:30

## 2023-05-05 RX ADMIN — BUPIVACAINE HYDROCHLORIDE 3 ML: 2.5 INJECTION, SOLUTION EPIDURAL; INFILTRATION; INTRACAUDAL; PERINEURAL at 11:08

## 2023-05-05 RX ADMIN — ACETAMINOPHEN 320 MG: 160 SUSPENSION ORAL at 20:31

## 2023-05-05 RX ADMIN — SODIUM CHLORIDE, POTASSIUM CHLORIDE, SODIUM LACTATE AND CALCIUM CHLORIDE: 600; 310; 30; 20 INJECTION, SOLUTION INTRAVENOUS at 10:11

## 2023-05-05 RX ADMIN — KETOROLAC TROMETHAMINE 15 MG: 30 INJECTION, SOLUTION INTRAMUSCULAR at 11:31

## 2023-05-05 RX ADMIN — MORPHINE SULFATE 1 MG: 0.5 INJECTION, SOLUTION EPIDURAL; INTRATHECAL; INTRAVENOUS at 11:08

## 2023-05-05 RX ADMIN — HYDROMORPHONE HYDROCHLORIDE: 1 INJECTION, SOLUTION INTRAMUSCULAR; INTRAVENOUS; SUBCUTANEOUS at 12:17

## 2023-05-05 RX ADMIN — SODIUM CHLORIDE, POTASSIUM CHLORIDE, SODIUM LACTATE AND CALCIUM CHLORIDE: 600; 310; 30; 20 INJECTION, SOLUTION INTRAVENOUS at 23:06

## 2023-05-05 RX ADMIN — LIDOCAINE HYDROCHLORIDE 0.5 ML: 10 INJECTION, SOLUTION EPIDURAL; INFILTRATION; INTRACAUDAL; PERINEURAL at 08:37

## 2023-05-05 RX ADMIN — DEXAMETHASONE SODIUM PHOSPHATE 10 MG: 4 INJECTION, SOLUTION INTRA-ARTICULAR; INTRALESIONAL; INTRAMUSCULAR; INTRAVENOUS; SOFT TISSUE at 10:35

## 2023-05-05 RX ADMIN — PANTOPRAZOLE SODIUM 40 MG: 40 INJECTION, POWDER, FOR SOLUTION INTRAVENOUS at 14:08

## 2023-05-05 RX ADMIN — LIDOCAINE HYDROCHLORIDE 40 MG: 20 INJECTION, SOLUTION EPIDURAL; INFILTRATION; INTRACAUDAL at 10:31

## 2023-05-05 ASSESSMENT — PATIENT HEALTH QUESTIONNAIRE - PHQ9
1. LITTLE INTEREST OR PLEASURE IN DOING THINGS: NOT AT ALL
SUM OF ALL RESPONSES TO PHQ9 QUESTIONS 1 AND 2: 0
2. FEELING DOWN, DEPRESSED, IRRITABLE, OR HOPELESS: NOT AT ALL

## 2023-05-05 ASSESSMENT — COGNITIVE AND FUNCTIONAL STATUS - GENERAL
DRESSING REGULAR UPPER BODY CLOTHING: A LITTLE
TOILETING: A LITTLE
DRESSING REGULAR LOWER BODY CLOTHING: A LOT
HELP NEEDED FOR BATHING: A LOT
SUGGESTED CMS G CODE MODIFIER MOBILITY: CK
MOVING TO AND FROM BED TO CHAIR: A LITTLE
DAILY ACTIVITIY SCORE: 18
MOVING FROM LYING ON BACK TO SITTING ON SIDE OF FLAT BED: A LITTLE
CLIMB 3 TO 5 STEPS WITH RAILING: A LITTLE
STANDING UP FROM CHAIR USING ARMS: A LITTLE
TURNING FROM BACK TO SIDE WHILE IN FLAT BAD: A LITTLE
WALKING IN HOSPITAL ROOM: A LITTLE
SUGGESTED CMS G CODE MODIFIER DAILY ACTIVITY: CK
MOBILITY SCORE: 18

## 2023-05-05 ASSESSMENT — LIFESTYLE VARIABLES
EVER FELT BAD OR GUILTY ABOUT YOUR DRINKING: NO
EVER HAD A DRINK FIRST THING IN THE MORNING TO STEADY YOUR NERVES TO GET RID OF A HANGOVER: NO
AVERAGE NUMBER OF DAYS PER WEEK YOU HAVE A DRINK CONTAINING ALCOHOL: 0
HOW MANY TIMES IN THE PAST YEAR HAVE YOU HAD 5 OR MORE DRINKS IN A DAY: 0
TOTAL SCORE: 0
TOTAL SCORE: 0
ALCOHOL_USE: NO
TOTAL SCORE: 0
HAVE YOU EVER FELT YOU SHOULD CUT DOWN ON YOUR DRINKING: NO
HAVE PEOPLE ANNOYED YOU BY CRITICIZING YOUR DRINKING: NO
CONSUMPTION TOTAL: NEGATIVE
ON A TYPICAL DAY WHEN YOU DRINK ALCOHOL HOW MANY DRINKS DO YOU HAVE: 0

## 2023-05-05 ASSESSMENT — COPD QUESTIONNAIRES
DO YOU EVER COUGH UP ANY MUCUS OR PHLEGM?: NO/ONLY WITH OCCASIONAL COLDS OR INFECTIONS
HAVE YOU SMOKED AT LEAST 100 CIGARETTES IN YOUR ENTIRE LIFE: NO/DON'T KNOW
DURING THE PAST 4 WEEKS HOW MUCH DID YOU FEEL SHORT OF BREATH: NONE/LITTLE OF THE TIME
COPD SCREENING SCORE: 2

## 2023-05-05 ASSESSMENT — PAIN DESCRIPTION - PAIN TYPE
TYPE: ACUTE PAIN;SURGICAL PAIN

## 2023-05-05 ASSESSMENT — PAIN SCALES - GENERAL: PAIN_LEVEL: 3

## 2023-05-05 NOTE — ANESTHESIA POSTPROCEDURE EVALUATION
Patient: Kathy Dumont    Procedure Summary     Date: 05/05/23 Room / Location: [facility] 08 / SURGERY [facility]    Anesthesia Start: 1011 Anesthesia Stop: 1150    Procedure: MAIK-EN-Y GASTROJEJUNOSTOMY BYPASS AND EXPLORATORY LAPAORTOMY (Abdomen) Diagnosis: (GASTRIC OUTLET OBSTRUCTION)    Surgeons: Bradly Cisneros M.D. Responsible Provider: Nancy Elias M.D.    Anesthesia Type: general, epidural ASA Status: 3          Final Anesthesia Type: general, epidural  Last vitals  BP   Blood Pressure : 100/59    Temp   36.4 °C (97.5 °F)    Pulse   (!) 56   Resp   15    SpO2   100 %      Anesthesia Post Evaluation    Patient location during evaluation: PACU  Patient participation: complete - patient participated  Level of consciousness: awake and alert  Pain score: 3    Airway patency: patent  Anesthetic complications: no  Cardiovascular status: hemodynamically stable  Respiratory status: acceptable  Hydration status: euvolemic    PONV: none          No notable events documented.

## 2023-05-05 NOTE — OP REPORT
DATE OF SERVICE:  05/05/2023     INDICATIONS:  The patient is a 67-year-old female patient of Dr. Cadet who   was referred to me after having been diagnosed with gastric outlet   obstruction. The patient had a long chronic history of anorexia nervosa.  In   any event, the patient has continued to lose weight and her BMI presently is   10.93 at 28 kilograms.  She is extremely malnourished and she says she can   only have liquids.  She has had multiple dilations as well as Botox   injections, but continues to have struggles with keeping any food down.  Her   most recent albumin was 3.9, but the patient appears dehydrated as well.  In   any event, after a long discussion, the patient elected to proceed with a   Sinai-en-Y gastrojejunostomy to bypass her pylorus.     SURGEON:  Bradly Cisneros MD     ASSISTANT SURGEON:  Beata Marcus PA: The indication for a surgical assistant in this surgery were indicated due to the complexity of the procedure.  Their role included aiding in the incision, retraction, holding of devices including cameras for laparoscopic procedures and closure of wounds.      ANESTHESIOLOGIST:  Nancy Elias MD     ANESTHESIA:  General and epidural for postoperative pain management.     ESTIMATED BLOOD LOSS:  Less than 50 mL     COMPLICATIONS:  No complications.     FINDINGS:  Extremely distended stomach consistent with gastric outlet   obstruction and a hypertrophic pylorus.     PREOPERATIVE DIAGNOSES:  1.  Gastric outlet obstruction.  2.  Malnutrition.  3.  Nausea and vomiting.  4.  Unintended weight loss.     POSTOPERATIVE DIAGNOSES:  1.  Gastric outlet obstruction.  2.  Malnutrition.  3.  Nausea and vomiting.  4.  Unintended weight loss.     PROCEDURE:  Sinai-en-Y gastrojejunostomy.     CASE CLASS:  Contaminated due to open stomach and small bowel.     DESCRIPTION OF PROCEDURE:  The patient was brought to OR, placed under general   anesthetic after having an epidural placed by   Curt for   postoperative pain management. After both arms out, Gunn catheter placed,   shaved, prepped with chlorhexidine, covered with sterile drapes, given   preoperative antibiotics.  Timeout was done, which was adequate.  At that   point, incision made from approximately 3-4 cm below the xiphoid down to the   level of the umbilicus.  Upon entering the abdomen, we immediately noted   extremely distended and enlarged stomach.  We had an OG placed by Dr. Elias in order to decompress her stomach, which was full of mucus, but   minimal bile.  At that point, we entered the lesser sac with the Thunderbeat   mobilizing the posterior wall of the stomach.  Once we had completed that, we   were able to hold this outside the abdomen.  We then went to the ligament of   Treitz, went distal by about 40 cm, transected the small bowel with an   Endo-SP 45 blue load, mobilized the mesentery in order to create our Sinai   limb.  We then did our side-to-side jejunojejunostomy with 3 staple technique   firing an Endo-SP 45 blue load proximal and distal and then closing the   enterotomies with TA 60s.  All staple lines were reinforced with interrupted   2-0 silk suture in Lembert fashion.  Once that was completed, we then took our   Sinai limb and placed it to the posterior wall of the stomach extending from   the beginning of the antrum to the pylorus. In the posterior wall, we secured   with interrupted 2-0 silk sutures, made a gastrotomy and enterotomy, and then   fired an Endo-SP 45 blue load proximal and distal followed by a TA 60.  We   reinforced all staple lines with 2-0 silk sutures and used Bovie   electrocautery for hemostasis.  The OG was removed.  We had complete opening   of the lumen.  We reinspected our jejunojejunostomy and showed no sign of   ischemia nor a Sinai limb.  So, at that point, the operation was completed.  We   then proceeded with laying the patient flat.  I placed the contents around   her  back and the abdomen.  The abdomen was negative.  All needle, sponge   counts were correct.  We then closed the abdomen with interrupted 0 Vicryl   suture in a figure-of-eight fashion. Abdomen was then closed and used staples   on the skin after irrigating small amount of fluid.  Then, a Prevena was   placed.  She was extubated and transferred to recovery.        ______________________________  MD TAYLOR Mcgowan/MARIA VICTORIA    DD:  05/05/2023 11:51  DT:  05/05/2023 14:54    Job#:  [id number]

## 2023-05-05 NOTE — PROGRESS NOTES
Report received from PACU RN, assumed care at 0700.  Pt is A0X4, and responds appropriately.  Pt declines any SOB, chest pain, new onset of numbness/ tingling.  Pt rates pain at 7/10, on a scale of 1-10, Epidural in place.   Pt has rehman with + void  Pt has hypoactive bowel sounds, last BM PTA  Pt ambulates with a x1 assist   Pt is on an NPO sips with meds diet, pt denies any nausea/vomiting.  Plan of care discussed, all questions answered. Explained importance of calling before getting OOB and pt verbalizes understanding. Explained importance of oral care. Call light is within reach, treaded slipper socks on, bed in lowest/ locked position, hourly rounding in place, all needs met at this time.

## 2023-05-05 NOTE — PROGRESS NOTES
Pt arrived to PACU stable. Pt remains A&Ox4, VSS, incisions are CDI, 1 bag of belongings in PACU.  called and updated on pt status and plan of care. Pt currently resting in bed in no acute distress. Epidural infusing with no issues, pt educated how to use bolus button, using appropriately

## 2023-05-05 NOTE — ANESTHESIA PROCEDURE NOTES
Epidural Block    Date/Time: 5/5/2023 10:18 AM  Performed by: Nancy Elias M.D.  Authorized by: Nancy Elias M.D.     Start Time:  5/5/2023 10:18 AM  End Time:  5/5/2023 10:23 AM  Reason for Block: at surgeon's request and post-op pain management    patient identified, IV checked, site marked, risks and benefits discussed, surgical consent, monitors and equipment checked, pre-op evaluation and timeout performed    Patient Position:  Sitting  Prep: ChloraPrep, patient draped and sterile technique    Monitoring:  Blood pressure, continuous pulse oximetry and heart rate  Location:  T8-T9  Injection Technique:  FLAVIO air and FLAVIO saline  Skin infiltration:  Lidocaine  Strength:  1%  Dose:  2ml  Needle Type:  Tuohy  Needle Gauge:  17 G  Needle Length:  3.5 in  Loss of resistance::  3.5  Catheter Size:  19 G  Catheter at Skin Depth:  8  Test Dose Result:  Negative

## 2023-05-05 NOTE — ANESTHESIA PROCEDURE NOTES
Airway    Date/Time: 5/5/2023 10:31 AM  Performed by: Nancy Elias M.D.  Authorized by: Nancy Elias M.D.     Location:  OR  Urgency:  Elective  Indications for Airway Management:  Anesthesia      Spontaneous Ventilation: absent    Sedation Level:  Deep  Preoxygenated: Yes    Patient Position:  Sniffing  Mask Difficulty Assessment:  0 - not attempted  Final Airway Type:  Endotracheal airway  Final Endotracheal Airway:  ETT  Cuffed: Yes    Technique Used for Successful ETT Placement:  Direct laryngoscopy    Insertion Site:  Oral  Blade Type:  Denis  Laryngoscope Blade/Videolaryngoscope Blade Size:  3  ETT Size (mm):  6.5  Measured from:  Teeth  ETT to Teeth (cm):  21  Placement Verified by: auscultation and capnometry    Cormack-Lehane Classification:  Grade IIa - partial view of glottis  Number of Attempts at Approach:  1

## 2023-05-05 NOTE — ANESTHESIA PREPROCEDURE EVALUATION
Case: [id number] Date/Time: 05/05/23 0915    Procedure: SINAI-EN-Y GASTROJEJUNOSTOMY BYPASS AND OTHER INDICATED PROCEDURES    Pre-op diagnosis: GASTRIC OUTLET OBSTRUCTION    Location: [facility] OR 08 / SURGERY [facility]    Surgeons: Bradly Cisneros M.D.        66yo F here for Ex lap and Sinai-En-Y    Relevant Problems   ANESTHESIA   (positive) PONV (postoperative nausea and vomiting)      ENDO   (positive) Hypothyroidism      Other   (positive) Anorexia   (positive) Gastroparesis   (positive) Pancreatitis, chronic (HCC)   (positive) Partial gastric outlet obstruction   (positive) Underweight due to inadequate caloric intake       Physical Exam    Airway   Mallampati: II  TM distance: >3 FB  Neck ROM: full       Cardiovascular - normal exam  Rhythm: regular  Rate: normal  (-) murmur     Dental - normal exam           Pulmonary - normal exam  Breath sounds clear to auscultation     Abdominal    Neurological - normal exam               Anesthesia Plan    ASA 3 (severe malnutrition)       Plan - general and epidural   Neuraxial block will be post-op pain control    Airway plan will be ETT          Induction: intravenous    Postoperative Plan: Postoperative administration of opioids is intended.    Pertinent diagnostic labs and testing reviewed    Informed Consent:    Anesthetic plan and risks discussed with patient.    Use of blood products discussed with: patient whom consented to blood products.

## 2023-05-05 NOTE — ANESTHESIA TIME REPORT
Anesthesia Start and Stop Event Times     Date Time Event    5/5/2023 0956 Ready for Procedure     1011 Anesthesia Start     1150 Anesthesia Stop        Responsible Staff  05/05/23    Name Role Begin End    Nancy Elias M.D. Anesth 1011 1150        Overtime Reason:  no overtime (within assigned shift)    Comments:

## 2023-05-05 NOTE — PROGRESS NOTES
Patient admitted to room T417 via transport in [facility] from PACU at 1320. Patient reports pain level of 7/10 using a numerical pain scale of 0-10. Pt has epidural in place. Admission VSS. Pt oriented to room and use of call light. Call light within reach, bed locked and in lowest position. No further needs at this time.

## 2023-05-05 NOTE — PROGRESS NOTES
4 Eyes Skin Assessment Completed by Marline GARCIA RN and YOUSUF Martines.    Head WDL  Ears WDL  Nose WDL  Mouth WDL  Neck WDL  Breast/Chest WDL  Shoulder Blades WDL  Spine WDL  (R) Arm/Elbow/Hand Redness and Blanching on elbows, mepilex applied.  (L) Arm/Elbow/Hand Redness and Blanching on elbow, mepilex applied.  Abdomen Redness, midline incision w/ prevena in place.  Groin WDL  Scrotum/Coccyx/Buttocks Redness and Blanching, mepilex applied.  (R) Leg WDL  (L) Leg WDL  (R) Heel/Foot/Toe Redness and Blanching, mepilex applied.  (L) Heel/Foot/Toe Redness and Blanching, mepilex applied.          Devices In Places Blood Pressure Cuff, Pulse Ox, Gunn, SCD's, and Oxy Mask      Interventions In Place Heel Mepilex, Sacral Mepilex, and Pillows, elbow mepilex    Possible Skin Injury Yes    Pictures Uploaded Into Epic N/A  Wound Consult Placed N/A  RN Wound Prevention Protocol Ordered No

## 2023-05-05 NOTE — CARE PLAN
Problem: Pain - Standard  Goal: Alleviation of pain or a reduction in pain to the patient’s comfort goal  Outcome: Progressing     Problem: Knowledge Deficit - Standard  Goal: Patient and family/care givers will demonstrate understanding of plan of care, disease process/condition, diagnostic tests and medications  Outcome: Progressing   The patient is Stable - Low risk of patient condition declining or worsening    Shift Goals  Clinical Goals: Monitor epidural, rest, IV fluids, Q4 neuro checks  Patient Goals: pain control, rest    Progress made toward(s) clinical / shift goals:  Pt admitted to floor with epidural in place. Q4 neuro checks in place, Q2 pain and sedation assessments in place. Will continue to monitor patient status. Pt has + void via rehman. NPO at this time.

## 2023-05-06 ENCOUNTER — ANESTHESIA EVENT (OUTPATIENT)
Dept: ANESTHESIOLOGY | Facility: MEDICAL CENTER | Age: 68
DRG: 326 | End: 2023-05-06
Payer: MEDICARE

## 2023-05-06 LAB
ALBUMIN SERPL BCP-MCNC: 3.2 G/DL (ref 3.2–4.9)
ALBUMIN/GLOB SERPL: 1.9 G/DL
ALP SERPL-CCNC: 63 U/L (ref 30–99)
ALT SERPL-CCNC: 33 U/L (ref 2–50)
ANION GAP SERPL CALC-SCNC: 9 MMOL/L (ref 7–16)
AST SERPL-CCNC: 32 U/L (ref 12–45)
BILIRUB SERPL-MCNC: 0.6 MG/DL (ref 0.1–1.5)
BUN SERPL-MCNC: 21 MG/DL (ref 8–22)
CALCIUM ALBUM COR SERPL-MCNC: 8.9 MG/DL (ref 8.5–10.5)
CALCIUM SERPL-MCNC: 8.3 MG/DL (ref 8.5–10.5)
CHLORIDE SERPL-SCNC: 106 MMOL/L (ref 96–112)
CO2 SERPL-SCNC: 22 MMOL/L (ref 20–33)
CREAT SERPL-MCNC: 0.57 MG/DL (ref 0.5–1.4)
ERYTHROCYTE [DISTWIDTH] IN BLOOD BY AUTOMATED COUNT: 42.2 FL (ref 35.9–50)
GFR SERPLBLD CREATININE-BSD FMLA CKD-EPI: 99 ML/MIN/1.73 M 2
GLOBULIN SER CALC-MCNC: 1.7 G/DL (ref 1.9–3.5)
GLUCOSE SERPL-MCNC: 121 MG/DL (ref 65–99)
HCT VFR BLD AUTO: 30 % (ref 37–47)
HGB BLD-MCNC: 10.4 G/DL (ref 12–16)
MCH RBC QN AUTO: 33.3 PG (ref 27–33)
MCHC RBC AUTO-ENTMCNC: 34.7 G/DL (ref 33.6–35)
MCV RBC AUTO: 96.2 FL (ref 81.4–97.8)
PLATELET # BLD AUTO: 189 K/UL (ref 164–446)
PMV BLD AUTO: 10.1 FL (ref 9–12.9)
POTASSIUM SERPL-SCNC: 4.3 MMOL/L (ref 3.6–5.5)
PROT SERPL-MCNC: 4.9 G/DL (ref 6–8.2)
RBC # BLD AUTO: 3.12 M/UL (ref 4.2–5.4)
SODIUM SERPL-SCNC: 137 MMOL/L (ref 135–145)
WBC # BLD AUTO: 9.9 K/UL (ref 4.8–10.8)

## 2023-05-06 PROCEDURE — 770001 HCHG ROOM/CARE - MED/SURG/GYN PRIV*

## 2023-05-06 PROCEDURE — 700102 HCHG RX REV CODE 250 W/ 637 OVERRIDE(OP): Performed by: ANESTHESIOLOGY

## 2023-05-06 PROCEDURE — P9045 ALBUMIN (HUMAN), 5%, 250 ML: HCPCS | Mod: JG | Performed by: SURGERY

## 2023-05-06 PROCEDURE — A9270 NON-COVERED ITEM OR SERVICE: HCPCS | Performed by: ANESTHESIOLOGY

## 2023-05-06 PROCEDURE — 700111 HCHG RX REV CODE 636 W/ 250 OVERRIDE (IP): Mod: JG | Performed by: SURGERY

## 2023-05-06 PROCEDURE — 700111 HCHG RX REV CODE 636 W/ 250 OVERRIDE (IP): Performed by: SURGERY

## 2023-05-06 PROCEDURE — C9113 INJ PANTOPRAZOLE SODIUM, VIA: HCPCS | Performed by: SURGERY

## 2023-05-06 PROCEDURE — A9270 NON-COVERED ITEM OR SERVICE: HCPCS | Performed by: SURGERY

## 2023-05-06 PROCEDURE — 36415 COLL VENOUS BLD VENIPUNCTURE: CPT

## 2023-05-06 PROCEDURE — 80053 COMPREHEN METABOLIC PANEL: CPT

## 2023-05-06 PROCEDURE — 85027 COMPLETE CBC AUTOMATED: CPT

## 2023-05-06 PROCEDURE — 99024 POSTOP FOLLOW-UP VISIT: CPT | Performed by: SURGERY

## 2023-05-06 PROCEDURE — 700105 HCHG RX REV CODE 258: Performed by: SURGERY

## 2023-05-06 PROCEDURE — 700102 HCHG RX REV CODE 250 W/ 637 OVERRIDE(OP): Performed by: SURGERY

## 2023-05-06 PROCEDURE — 97162 PT EVAL MOD COMPLEX 30 MIN: CPT

## 2023-05-06 RX ORDER — SODIUM CHLORIDE 9 MG/ML
250 INJECTION, SOLUTION INTRAVENOUS EVERY 4 HOURS PRN
Status: DISCONTINUED | OUTPATIENT
Start: 2023-05-06 | End: 2023-05-11 | Stop reason: HOSPADM

## 2023-05-06 RX ORDER — CELECOXIB 200 MG/1
200 CAPSULE ORAL
Status: DISCONTINUED | OUTPATIENT
Start: 2023-05-06 | End: 2023-05-11 | Stop reason: HOSPADM

## 2023-05-06 RX ORDER — OXYCODONE HYDROCHLORIDE 5 MG/1
5 TABLET ORAL EVERY 6 HOURS PRN
Status: DISCONTINUED | OUTPATIENT
Start: 2023-05-06 | End: 2023-05-11 | Stop reason: HOSPADM

## 2023-05-06 RX ORDER — TRAMADOL HYDROCHLORIDE 50 MG/1
50 TABLET ORAL EVERY 6 HOURS
Status: DISCONTINUED | OUTPATIENT
Start: 2023-05-06 | End: 2023-05-06

## 2023-05-06 RX ORDER — OXYCODONE HYDROCHLORIDE 5 MG/1
5 TABLET ORAL EVERY 6 HOURS
Status: DISCONTINUED | OUTPATIENT
Start: 2023-05-06 | End: 2023-05-06

## 2023-05-06 RX ORDER — ALBUMIN, HUMAN INJ 5% 5 %
25 SOLUTION INTRAVENOUS ONCE
Status: COMPLETED | OUTPATIENT
Start: 2023-05-06 | End: 2023-05-06

## 2023-05-06 RX ADMIN — ACETAMINOPHEN 320 MG: 160 SUSPENSION ORAL at 10:30

## 2023-05-06 RX ADMIN — ALBUMIN (HUMAN) 25 G: 2.5 SOLUTION INTRAVENOUS at 05:55

## 2023-05-06 RX ADMIN — CEFTRIAXONE SODIUM 1000 MG: 10 INJECTION, POWDER, FOR SOLUTION INTRAVENOUS at 14:11

## 2023-05-06 RX ADMIN — SODIUM CHLORIDE, POTASSIUM CHLORIDE, SODIUM LACTATE AND CALCIUM CHLORIDE: 600; 310; 30; 20 INJECTION, SOLUTION INTRAVENOUS at 21:30

## 2023-05-06 RX ADMIN — ACETAMINOPHEN 320 MG: 160 SUSPENSION ORAL at 02:05

## 2023-05-06 RX ADMIN — ACETAMINOPHEN 320 MG: 160 SUSPENSION ORAL at 16:34

## 2023-05-06 RX ADMIN — ACETAMINOPHEN 320 MG: 160 SUSPENSION ORAL at 20:18

## 2023-05-06 RX ADMIN — PANTOPRAZOLE SODIUM 40 MG: 40 INJECTION, POWDER, FOR SOLUTION INTRAVENOUS at 04:39

## 2023-05-06 RX ADMIN — OXYCODONE 5 MG: 5 TABLET ORAL at 14:10

## 2023-05-06 RX ADMIN — SODIUM CHLORIDE 250 ML: 9 INJECTION, SOLUTION INTRAVENOUS at 04:38

## 2023-05-06 RX ADMIN — CELECOXIB 200 MG: 200 CAPSULE ORAL at 12:02

## 2023-05-06 RX ADMIN — SODIUM CHLORIDE 250 ML: 9 INJECTION, SOLUTION INTRAVENOUS at 20:20

## 2023-05-06 ASSESSMENT — PAIN DESCRIPTION - PAIN TYPE
TYPE: ACUTE PAIN;SURGICAL PAIN

## 2023-05-06 ASSESSMENT — ENCOUNTER SYMPTOMS
WEIGHT LOSS: 0
DIARRHEA: 0
VOMITING: 0
COUGH: 0
TINGLING: 0
EYE PAIN: 0
HEARTBURN: 0
CONSTIPATION: 0
DEPRESSION: 0
DIZZINESS: 0
SPUTUM PRODUCTION: 0
ABDOMINAL PAIN: 1
BRUISES/BLEEDS EASILY: 0
NECK PAIN: 0
PHOTOPHOBIA: 0
BLURRED VISION: 0
SPEECH CHANGE: 0
TREMORS: 0
PALPITATIONS: 0
FEVER: 0
DOUBLE VISION: 0
CHILLS: 0
SENSORY CHANGE: 0
BACK PAIN: 0
HEMOPTYSIS: 0
EYE DISCHARGE: 0
ORTHOPNEA: 0
CLAUDICATION: 0
NERVOUS/ANXIOUS: 0
MYALGIAS: 0
HEADACHES: 0
NAUSEA: 0
HALLUCINATIONS: 0
FOCAL WEAKNESS: 0

## 2023-05-06 ASSESSMENT — GAIT ASSESSMENTS
ASSISTIVE DEVICE: HAND HELD ASSIST
DEVIATION: BRADYKINETIC
DISTANCE (FEET): 25
GAIT LEVEL OF ASSIST: CONTACT GUARD ASSIST

## 2023-05-06 ASSESSMENT — COGNITIVE AND FUNCTIONAL STATUS - GENERAL
CLIMB 3 TO 5 STEPS WITH RAILING: A LITTLE
SUGGESTED CMS G CODE MODIFIER MOBILITY: CK
MOVING FROM LYING ON BACK TO SITTING ON SIDE OF FLAT BED: UNABLE
WALKING IN HOSPITAL ROOM: A LITTLE
MOBILITY SCORE: 15
STANDING UP FROM CHAIR USING ARMS: A LITTLE
MOVING TO AND FROM BED TO CHAIR: UNABLE

## 2023-05-06 ASSESSMENT — LIFESTYLE VARIABLES: SUBSTANCE_ABUSE: 0

## 2023-05-06 NOTE — PROGRESS NOTES
Hgb dropped from 12.9 to 10.4. Surgical sites show no signs of bleeding. Patient persistently hypotensive with low urine output. Gave multiple boluses and decreased epidural to 2ml/hr basal rate per nursing communication . Patient still hypotensive at 81/43. Paged on call for Dr. GALLO Updated Dr. Rose, order for albumin placed.

## 2023-05-06 NOTE — CARE PLAN
The patient is Stable - Low risk of patient condition declining or worsening    Shift Goals  Clinical Goals: Monitor epidural, monitor BP  Patient Goals: Rest    Progress made toward(s) clinical / shift goals:  Epidural monitored, slight leaking at site. Anesthesiologist at bedside to assess, no further interventions stated by anesthesiologist. BP consistently low. 2 boluses administered, patient still hypotensive. Paged provider, albumin ordered    Patient is not progressing towards the following goals:

## 2023-05-06 NOTE — PROGRESS NOTES
Surgical Progress Note    Author: Spencer Rose M.D. Date & Time created: 2023   12:40 PM     POD 1 - ex-lap, rosalie-y g-j    Interval Events:  BP borderline overnight but good UOP, normal HR  Pain controlled  Denies nausea or vomiting  Has not ambulate yet today    Review of Systems   Constitutional:  Negative for chills, fever, malaise/fatigue and weight loss.   HENT:  Negative for congestion, ear discharge, ear pain, hearing loss and nosebleeds.    Eyes:  Negative for blurred vision, double vision, photophobia, pain and discharge.   Respiratory:  Negative for cough, hemoptysis and sputum production.    Cardiovascular:  Negative for chest pain, palpitations, orthopnea and claudication.   Gastrointestinal:  Positive for abdominal pain. Negative for constipation, diarrhea, heartburn, nausea and vomiting.   Genitourinary:  Negative for dysuria, frequency, hematuria and urgency.   Musculoskeletal:  Negative for back pain, joint pain, myalgias and neck pain.   Skin:  Negative for itching and rash.   Neurological:  Negative for dizziness, tingling, tremors, sensory change, speech change, focal weakness and headaches.   Endo/Heme/Allergies:  Negative for environmental allergies. Does not bruise/bleed easily.   Psychiatric/Behavioral:  Negative for depression, hallucinations, substance abuse and suicidal ideas. The patient is not nervous/anxious.    Hemodynamics:  Temp (24hrs), Av.4 °C (97.6 °F), Min:35.9 °C (96.6 °F), Max:36.8 °C (98.2 °F)  Temperature: 36.6 °C (97.9 °F)  Pulse  Av.8  Min: 46  Max: 65   Blood Pressure : (!) 81/43 (RN notified)     Respiratory:    Respiration: 16, Pulse Oximetry: 92 %        RUL Breath Sounds: Clear, RML Breath Sounds: Clear, RLL Breath Sounds: Diminished, THANIA Breath Sounds: Clear, LLL Breath Sounds: Diminished  Neuro:  GCS       Fluids:    Intake/Output Summary (Last 24 hours) at 2023 1240  Last data filed at 2023 1152  Gross per 24 hour   Intake 2596.8 ml    Output 630 ml   Net 1966.8 ml        Current Diet Order   Procedures    Diet Order Diet: Full Liquid     Physical Exam  Constitutional:       Appearance: Normal appearance.   Cardiovascular:      Rate and Rhythm: Normal rate and regular rhythm.   Pulmonary:      Effort: Pulmonary effort is normal. No respiratory distress.   Abdominal:      General: Abdomen is flat. There is no distension.      Palpations: Abdomen is soft.      Comments: Provena in place over incision, no issues   Neurological:      Mental Status: She is alert.     Labs:  Recent Results (from the past 24 hour(s))   CBC without Differential    Collection Time: 05/06/23  1:48 AM   Result Value Ref Range    WBC 9.9 4.8 - 10.8 K/uL    RBC 3.12 (L) 4.20 - 5.40 M/uL    Hemoglobin 10.4 (L) 12.0 - 16.0 g/dL    Hematocrit 30.0 (L) 37.0 - 47.0 %    MCV 96.2 81.4 - 97.8 fL    MCH 33.3 (H) 27.0 - 33.0 pg    MCHC 34.7 33.6 - 35.0 g/dL    RDW 42.2 35.9 - 50.0 fL    Platelet Count 189 164 - 446 K/uL    MPV 10.1 9.0 - 12.9 fL   Comp Metabolic Panel (CMP)    Collection Time: 05/06/23  1:48 AM   Result Value Ref Range    Sodium 137 135 - 145 mmol/L    Potassium 4.3 3.6 - 5.5 mmol/L    Chloride 106 96 - 112 mmol/L    Co2 22 20 - 33 mmol/L    Anion Gap 9.0 7.0 - 16.0    Glucose 121 (H) 65 - 99 mg/dL    Bun 21 8 - 22 mg/dL    Creatinine 0.57 0.50 - 1.40 mg/dL    Calcium 8.3 (L) 8.5 - 10.5 mg/dL    AST(SGOT) 32 12 - 45 U/L    ALT(SGPT) 33 2 - 50 U/L    Alkaline Phosphatase 63 30 - 99 U/L    Total Bilirubin 0.6 0.1 - 1.5 mg/dL    Albumin 3.2 3.2 - 4.9 g/dL    Total Protein 4.9 (L) 6.0 - 8.2 g/dL    Globulin 1.7 (L) 1.9 - 3.5 g/dL    A-G Ratio 1.9 g/dL   CORRECTED CALCIUM    Collection Time: 05/06/23  1:48 AM   Result Value Ref Range    Correct Calcium 8.9 8.5 - 10.5 mg/dL   ESTIMATED GFR    Collection Time: 05/06/23  1:48 AM   Result Value Ref Range    GFR (CKD-EPI) 99 >60 mL/min/1.73 m 2     Medical Decision Making, by Problem:  Active Hospital Problems    Diagnosis      Severe protein-calorie malnutrition (HCC) [E43]      Priority: High    Partial gastric outlet obstruction [K31.1]      Priority: High    Underweight due to inadequate caloric intake [R63.6]      Priority: High    Delayed gastric emptying [K30]      Priority: High    Eating disorder [F50.9]      Priority: High     Formatting of this note might be different from the original.  Anorexia nervosa      Pancreatitis, chronic (HCC) [K86.1]      Priority: High    Gastroparesis [K31.84]      Priority: High    Gastric outlet obstruction [K31.1]      Plan:    Her BP was borderline overnight, required additional fluid bolus but UOP based on weight is 0.7-1.0 cc/kg/hr and no tachycardia so suspect related to epidural.  Will hold epidural today and assess her BP, oral pain meds added.  Start full liquids today    Quality Measures:  Quality-Core Measures    Discussed patient condition with Family, RN, and Patient

## 2023-05-06 NOTE — ANESTHESIA POST-OP FOLLOW-UP NOTE
"Anesthesia Pain Service Note    Type:  Epidural catheter    Patient: Kathy Dumont    Patient seen and examined. and Patient chart reviewed.     BP (!) 80/46   Pulse (!) 50   Temp 36.6 °C (97.9 °F) (Temporal)   Resp 17   Ht 1.6 m (5' 3\")   Wt 28 kg (61 lb 11.7 oz)   SpO2 94%   BMI 10.93 kg/m²     Complaints:  No complaints.    Pain:   0/10    LOC:   Awake    Rate:  0    Exam:  Vital signs stable, Afebrile and Site clean, dry, intact without tenderness or erythema    Impression:  Adequate analgesia    Assessment & Plan:  Acute post-procedural pain (G89.18)     No change  - continue    Per Dr Rose, epidural turned off 2nd to hypotension.  Patient still with no pain complaints at rest even though epidural has been shut off for several hours.  Patient has been offered PO opioids prn.  Consider d/c'ing epidural tomorrow if it remains unutilized/unneeded  Scout Leon III, M.D.  5/6/2023  4:40 PM  "

## 2023-05-06 NOTE — PROGRESS NOTES
Bedside report received from dayshift RN, assumed care at 1900.  Assessment complete.  A&O x 4. Patient calls appropriately. Neuro intact  Patient ambulates with x1 assist.   Patient has 7/10 pain. Pain managed with epidural, slight leak present  Denies N&V. NPO sips with meds  Surgical Mli with prevena.  + void via rehman catheter, - flatus, - BM.  Patient denies SOB. On room air  SCD's on.  Patient calm, pleasant, and cooperative.  Review plan with of care with patient. Call light and personal belongings within reach. Hourly rounding in place. All needs met at this time.

## 2023-05-06 NOTE — PROGRESS NOTES
Called to evaluate epidural for ?leak. Some dampness noted in bed, however epidural was well-secured with tegaderm and tape. In place at 8cm, pain well-controlled. No changes necessary at this time.

## 2023-05-07 ENCOUNTER — ANESTHESIA EVENT (OUTPATIENT)
Dept: ANESTHESIOLOGY | Facility: MEDICAL CENTER | Age: 68
DRG: 326 | End: 2023-05-07
Payer: MEDICARE

## 2023-05-07 LAB
ALBUMIN SERPL BCP-MCNC: 2.8 G/DL (ref 3.2–4.9)
ALBUMIN/GLOB SERPL: 1.6 G/DL
ALP SERPL-CCNC: 53 U/L (ref 30–99)
ALT SERPL-CCNC: 21 U/L (ref 2–50)
ANION GAP SERPL CALC-SCNC: 8 MMOL/L (ref 7–16)
AST SERPL-CCNC: 27 U/L (ref 12–45)
BASOPHILS # BLD AUTO: 0.2 % (ref 0–1.8)
BASOPHILS # BLD: 0.01 K/UL (ref 0–0.12)
BILIRUB SERPL-MCNC: 0.5 MG/DL (ref 0.1–1.5)
BUN SERPL-MCNC: 17 MG/DL (ref 8–22)
CALCIUM ALBUM COR SERPL-MCNC: 9.2 MG/DL (ref 8.5–10.5)
CALCIUM SERPL-MCNC: 8.2 MG/DL (ref 8.5–10.5)
CHLORIDE SERPL-SCNC: 110 MMOL/L (ref 96–112)
CO2 SERPL-SCNC: 20 MMOL/L (ref 20–33)
CREAT SERPL-MCNC: 0.56 MG/DL (ref 0.5–1.4)
EOSINOPHIL # BLD AUTO: 0.01 K/UL (ref 0–0.51)
EOSINOPHIL NFR BLD: 0.2 % (ref 0–6.9)
ERYTHROCYTE [DISTWIDTH] IN BLOOD BY AUTOMATED COUNT: 43.9 FL (ref 35.9–50)
GFR SERPLBLD CREATININE-BSD FMLA CKD-EPI: 100 ML/MIN/1.73 M 2
GLOBULIN SER CALC-MCNC: 1.8 G/DL (ref 1.9–3.5)
GLUCOSE SERPL-MCNC: 90 MG/DL (ref 65–99)
HCT VFR BLD AUTO: 30.5 % (ref 37–47)
HGB BLD-MCNC: 10.2 G/DL (ref 12–16)
IMM GRANULOCYTES # BLD AUTO: 0.03 K/UL (ref 0–0.11)
IMM GRANULOCYTES NFR BLD AUTO: 0.5 % (ref 0–0.9)
LYMPHOCYTES # BLD AUTO: 0.82 K/UL (ref 1–4.8)
LYMPHOCYTES NFR BLD: 13.1 % (ref 22–41)
MCH RBC QN AUTO: 32.8 PG (ref 27–33)
MCHC RBC AUTO-ENTMCNC: 33.4 G/DL (ref 33.6–35)
MCV RBC AUTO: 98.1 FL (ref 81.4–97.8)
MONOCYTES # BLD AUTO: 0.14 K/UL (ref 0–0.85)
MONOCYTES NFR BLD AUTO: 2.2 % (ref 0–13.4)
NEUTROPHILS # BLD AUTO: 5.23 K/UL (ref 2–7.15)
NEUTROPHILS NFR BLD: 83.8 % (ref 44–72)
NRBC # BLD AUTO: 0 K/UL
NRBC BLD-RTO: 0 /100 WBC
PLATELET # BLD AUTO: 171 K/UL (ref 164–446)
PMV BLD AUTO: 9.8 FL (ref 9–12.9)
POTASSIUM SERPL-SCNC: 4 MMOL/L (ref 3.6–5.5)
PROT SERPL-MCNC: 4.6 G/DL (ref 6–8.2)
RBC # BLD AUTO: 3.11 M/UL (ref 4.2–5.4)
SODIUM SERPL-SCNC: 138 MMOL/L (ref 135–145)
WBC # BLD AUTO: 6.2 K/UL (ref 4.8–10.8)

## 2023-05-07 PROCEDURE — 700111 HCHG RX REV CODE 636 W/ 250 OVERRIDE (IP): Performed by: SURGERY

## 2023-05-07 PROCEDURE — C9113 INJ PANTOPRAZOLE SODIUM, VIA: HCPCS | Performed by: SURGERY

## 2023-05-07 PROCEDURE — 85025 COMPLETE CBC W/AUTO DIFF WBC: CPT

## 2023-05-07 PROCEDURE — 700102 HCHG RX REV CODE 250 W/ 637 OVERRIDE(OP): Performed by: SURGERY

## 2023-05-07 PROCEDURE — A9270 NON-COVERED ITEM OR SERVICE: HCPCS | Performed by: ANESTHESIOLOGY

## 2023-05-07 PROCEDURE — A9270 NON-COVERED ITEM OR SERVICE: HCPCS | Performed by: SURGERY

## 2023-05-07 PROCEDURE — 700102 HCHG RX REV CODE 250 W/ 637 OVERRIDE(OP): Performed by: ANESTHESIOLOGY

## 2023-05-07 PROCEDURE — 36415 COLL VENOUS BLD VENIPUNCTURE: CPT

## 2023-05-07 PROCEDURE — 700105 HCHG RX REV CODE 258: Performed by: SURGERY

## 2023-05-07 PROCEDURE — 770001 HCHG ROOM/CARE - MED/SURG/GYN PRIV*

## 2023-05-07 PROCEDURE — 99024 POSTOP FOLLOW-UP VISIT: CPT | Performed by: SURGERY

## 2023-05-07 PROCEDURE — 80053 COMPREHEN METABOLIC PANEL: CPT

## 2023-05-07 RX ADMIN — OXYCODONE 5 MG: 5 TABLET ORAL at 14:46

## 2023-05-07 RX ADMIN — ACETAMINOPHEN 320 MG: 160 SUSPENSION ORAL at 01:12

## 2023-05-07 RX ADMIN — SODIUM CHLORIDE, POTASSIUM CHLORIDE, SODIUM LACTATE AND CALCIUM CHLORIDE: 600; 310; 30; 20 INJECTION, SOLUTION INTRAVENOUS at 22:01

## 2023-05-07 RX ADMIN — ACETAMINOPHEN 320 MG: 160 SUSPENSION ORAL at 14:46

## 2023-05-07 RX ADMIN — CELECOXIB 200 MG: 200 CAPSULE ORAL at 12:35

## 2023-05-07 RX ADMIN — PANTOPRAZOLE SODIUM 40 MG: 40 INJECTION, POWDER, FOR SOLUTION INTRAVENOUS at 06:15

## 2023-05-07 RX ADMIN — SODIUM CHLORIDE, POTASSIUM CHLORIDE, SODIUM LACTATE AND CALCIUM CHLORIDE 1000 ML: 600; 310; 30; 20 INJECTION, SOLUTION INTRAVENOUS at 02:35

## 2023-05-07 RX ADMIN — OXYCODONE 5 MG: 5 TABLET ORAL at 06:16

## 2023-05-07 RX ADMIN — SODIUM CHLORIDE 250 ML: 9 INJECTION, SOLUTION INTRAVENOUS at 01:14

## 2023-05-07 RX ADMIN — SODIUM CHLORIDE, POTASSIUM CHLORIDE, SODIUM LACTATE AND CALCIUM CHLORIDE: 600; 310; 30; 20 INJECTION, SOLUTION INTRAVENOUS at 06:17

## 2023-05-07 RX ADMIN — CEFTRIAXONE SODIUM 1000 MG: 10 INJECTION, POWDER, FOR SOLUTION INTRAVENOUS at 14:56

## 2023-05-07 RX ADMIN — ACETAMINOPHEN 320 MG: 160 SUSPENSION ORAL at 20:18

## 2023-05-07 RX ADMIN — ACETAMINOPHEN 320 MG: 160 SUSPENSION ORAL at 08:20

## 2023-05-07 RX ADMIN — SODIUM CHLORIDE, POTASSIUM CHLORIDE, SODIUM LACTATE AND CALCIUM CHLORIDE: 600; 310; 30; 20 INJECTION, SOLUTION INTRAVENOUS at 15:05

## 2023-05-07 ASSESSMENT — ENCOUNTER SYMPTOMS
HEADACHES: 0
DIZZINESS: 0
TREMORS: 0
SENSORY CHANGE: 0
CLAUDICATION: 0
ORTHOPNEA: 0
HEARTBURN: 0
PHOTOPHOBIA: 0
BLURRED VISION: 0
MYALGIAS: 0
HALLUCINATIONS: 0
HEMOPTYSIS: 0
BACK PAIN: 0
FEVER: 0
SPEECH CHANGE: 0
CONSTIPATION: 0
FOCAL WEAKNESS: 0
VOMITING: 0
COUGH: 0
PALPITATIONS: 0
DEPRESSION: 0
NAUSEA: 0
WEIGHT LOSS: 0
NECK PAIN: 0
TINGLING: 0
DIARRHEA: 0
EYE PAIN: 0
EYE DISCHARGE: 0
DOUBLE VISION: 0
ABDOMINAL PAIN: 1
CHILLS: 0
SPUTUM PRODUCTION: 0
NERVOUS/ANXIOUS: 0
BRUISES/BLEEDS EASILY: 0

## 2023-05-07 ASSESSMENT — PAIN DESCRIPTION - PAIN TYPE
TYPE: ACUTE PAIN;SURGICAL PAIN
TYPE: ACUTE PAIN
TYPE: ACUTE PAIN;SURGICAL PAIN
TYPE: ACUTE PAIN;SURGICAL PAIN

## 2023-05-07 ASSESSMENT — LIFESTYLE VARIABLES: SUBSTANCE_ABUSE: 0

## 2023-05-07 NOTE — PROGRESS NOTES
Surgical Progress Note    Author: Spencer Rose M.D. Date & Time created: 2023 9:15 AM         POD 2- ex-lap, rosalie-y g-j    Interval Events:  Continues to have low blood pressure but making great urine.  Pain is well controlled.  Tolerated some liquid yesterday.  No gas from below yet.  Otherwise was able to ambulate yesterday without issue.    Review of Systems   Constitutional:  Negative for chills, fever, malaise/fatigue and weight loss.   HENT:  Negative for congestion, ear discharge, ear pain, hearing loss and nosebleeds.    Eyes:  Negative for blurred vision, double vision, photophobia, pain and discharge.   Respiratory:  Negative for cough, hemoptysis and sputum production.    Cardiovascular:  Negative for chest pain, palpitations, orthopnea and claudication.   Gastrointestinal:  Positive for abdominal pain. Negative for constipation, diarrhea, heartburn, nausea and vomiting.   Genitourinary:  Negative for dysuria, frequency, hematuria and urgency.   Musculoskeletal:  Negative for back pain, joint pain, myalgias and neck pain.   Skin:  Negative for itching and rash.   Neurological:  Negative for dizziness, tingling, tremors, sensory change, speech change, focal weakness and headaches.   Endo/Heme/Allergies:  Negative for environmental allergies. Does not bruise/bleed easily.   Psychiatric/Behavioral:  Negative for depression, hallucinations, substance abuse and suicidal ideas. The patient is not nervous/anxious.    Hemodynamics:  Temp (24hrs), Av.8 °C (98.2 °F), Min:36.6 °C (97.9 °F), Max:37 °C (98.6 °F)  Temperature: 37 °C (98.6 °F)  Pulse  Av.7  Min: 46  Max: 65   Blood Pressure : (!) 81/46 (RN Notified)     Respiratory:    Respiration: 17, Pulse Oximetry: 96 %        RUL Breath Sounds: Clear, RML Breath Sounds: Clear, RLL Breath Sounds: Diminished, THANIA Breath Sounds: Clear, LLL Breath Sounds: Diminished  Neuro:  GCS       Fluids:    Intake/Output Summary (Last 24 hours) at 2023  1240  Last data filed at 5/6/2023 1152  Gross per 24 hour   Intake 2596.8 ml   Output 630 ml   Net 1966.8 ml        Current Diet Order   Procedures    Diet Order Diet: Regular     Physical Exam  Constitutional:       Appearance: Normal appearance.   Cardiovascular:      Rate and Rhythm: Normal rate and regular rhythm.   Pulmonary:      Effort: Pulmonary effort is normal. No respiratory distress.   Abdominal:      General: Abdomen is flat. There is no distension.      Palpations: Abdomen is soft.      Comments: Provena in place over incision, no issues   Neurological:      Mental Status: She is alert.     Labs:  No results found for this or any previous visit (from the past 24 hour(s)).    Medical Decision Making, by Problem:  Active Hospital Problems    Diagnosis     Severe protein-calorie malnutrition (HCC) [E43]      Priority: High    Partial gastric outlet obstruction [K31.1]      Priority: High    Underweight due to inadequate caloric intake [R63.6]      Priority: High    Delayed gastric emptying [K30]      Priority: High    Eating disorder [F50.9]      Priority: High     Formatting of this note might be different from the original.  Anorexia nervosa      Pancreatitis, chronic (HCC) [K86.1]      Priority: High    Gastroparesis [K31.84]      Priority: High    Gastric outlet obstruction [K31.1]      Plan:    We will plan to DC epidural and Gunn today as its not running.  Oral pain meds as needed with scheduled Tylenol and Celebrex.  Once epidural and Gunn are out we can start prophylactic Xarelto as she does not have enough body fat for Lovenox or heparin injections.  Otherwise should be out of bed ambulating and using incentive spirometer.  Advance to regular diet today    Quality Measures:  Quality-Core Measures    Discussed patient condition with Family, RN, and Patient

## 2023-05-07 NOTE — ANESTHESIA POST-OP FOLLOW-UP NOTE
"Anesthesia Pain Service Note    Type:  Epidural catheter    Patient: Kathy Dumont    Patient seen and examined. and Patient chart reviewed.     BP (!) 81/46 Comment: RN Notified  Pulse (!) 50 Comment: RN Notfied  Temp 37 °C (98.6 °F) (Temporal)   Resp 17   Ht 1.6 m (5' 3\")   Wt 28 kg (61 lb 11.7 oz)   SpO2 96%   BMI 10.93 kg/m²     Complaints:  No complaints.    Pain:   3/10    LOC:   Awake arousable    Rate:  Off    Exam:  Vital signs stable, Afebrile and Site clean, dry, intact without tenderness or erythema    Impression:  Adequate analgesia    Assessment & Plan:  Acute post-procedural pain (G89.18)     D/C by MD (Tip intact, no apparent complications)     POD #2 s/p Ex-lap, Sinai-en-Y gastrojejunostomy  1. Off anticoagulants > 12 hours  2. OOB with PT yesterday  3. Taking PO oxycodone with good pain relief  4. Epidural d/c'd with tip intact  5. Completion of anesthesiology management    Fredi Read M.D.  5/7/2023  9:22 AM  "

## 2023-05-07 NOTE — THERAPY
Physical Therapy   Initial Evaluation     Patient Name: Kathy Dumont  Age:  67 y.o., Sex:  female  Medical Record #: [id number]  Today's Date: 5/6/2023     Precautions  Precautions: Fall Risk  Comments: epidural    Assessment  Pt presents with impaired activity tolerance and hemodynamics associated with chief complaint gastric outlet obstruction, now s/p rosalie-en-Y gastrojejunostomy in setting of unintended weight loss and anorexia. Pt is very pleasant and mobilizing fairly well; hypotensive throughout but stable (80s/40s) with seated activity and short distance ambulation; pt reporting spouse can assist; at this time would recommend home health after a few more acute PT sessions; if home health not available in her area may benefit from acute rehab consult; will update as pt able to eat/BP improves.     Plan    Physical Therapy Initial Treatment Plan   Treatment Plan : Bed Mobility, Equipment, Gait Training, Manual Therapy, Neuro Re-Education / Balance, Self Care / Home Evaluation, Stair Training, Therapeutic Activities, Therapeutic Exercise  Treatment Frequency: 3 Times per Week  Duration: Until Therapy Goals Met    DC Equipment Recommendations: Unable to determine at this time  Discharge Recommendations: Recommend home health for continued physical therapy services after at least 1-2 more acute PT sessions.        Abridged Subjective/Objective     05/06/23 1125   Prior Living Situation   Prior Services Home-Independent   Housing / Facility 1 Story House   Steps Into Home 2   Equipment Owned None   Lives with - Patient's Self Care Capacity Spouse   Comments spouse available to assist but works part time (10 minutes down the road); denies prior falls;   Prior Level of Functional Mobility   Bed Mobility Independent   Transfer Status Independent   Ambulation Independent   Ambulation Distance to tolerance   Assistive Devices Used None   Cognition    Cognition / Consciousness WDL   Level of Consciousness  Alert   Comments pleasant and cooperative;   Passive ROM Lower Body   Passive ROM Lower Body WDL   Strength Lower Body   Lower Body Strength  WDL   Comments appropriate for age/size   Sensation Lower Body   Lower Extremity Sensation   WDL   Balance Assessment   Sitting Balance (Static) Good   Sitting Balance (Dynamic) Fair +   Standing Balance (Static) Fair   Standing Balance (Dynamic) Fair -   Weight Shift Sitting Good   Weight Shift Standing Fair   Comments no UE support in sitting/standing; no loss of balance in room   Bed Mobility    Supine to Sit Minimal Assist   Sit to Supine Minimal Assist   Gait Analysis   Gait Level Of Assist Contact Guard Assist   Assistive Device Hand Held Assist   Distance (Feet) 25   # of Times Distance was Traveled 1   Deviation Bradykinetic   Weight Bearing Status full   Vision Deficits Impacting Mobility denies   Comments distance limited by pt, reports of dizziness and fatigue   Functional Mobility   Sit to Stand Contact Guard Assist   Bed, Chair, Wheelchair Transfer Refused   Edema / Skin Assessment   Edema / Skin  X   Comments epidural attached pre/post; incison c/d/i with wound vac   Patient / Family Goals    Patient / Family Goal #1 to improve strength/eat   Short Term Goals    Short Term Goal # 1 Pt will perform supine<>sit from flat HOB/no railing with supervision wtihin 6 visits to ensure independent mobility at home.   Short Term Goal # 2 Pt will perform sit<>stand with no device and supervision within 6 visits to ensure independent mobility at home.   Short Term Goal # 3 Pt will ambulate x 150ft wihthout device and supervision wtihin 6 visits to ensure independent mobility ath ome.   Short Term Goal # 4 Pt will ascend/descend 2 steps to enter/exit home wiht B UE support and supervision wtihin 6 visits to ensure independnet moblity ath ome.   Education Group   Role of Physical Therapist Patient Response Patient;Acceptance;Explanation;Demonstration;Verbal Demonstration;Action  Demonstration

## 2023-05-07 NOTE — CARE PLAN
The patient is   Stable - Low risk of patient condition declining or worsening  Shift Goals  Clinical Goals: nutrition; hemodynamic stability  Patient Goals: comfort    Progress made toward(s) clinical / shift goals:  continuing to encourage nutrition; patient hemodynamically stable    Patient is not progressing towards the following goals:

## 2023-05-07 NOTE — PROGRESS NOTES
AA&Ox4. Denies CP/SOB.  Reporting 5/10 pain. Medicated per MAR.   Educated patient regarding pharmacologic and non pharmacologic modalities for pain management.  Skin per flowsheet.  Tolerating full liquid diet. Denies N/V.  + void via rehman. Last BM PTA, + flatus.  Pt ambulates x1 assist.  All needs met at this time. Call light within reach. Pt calls appropriately. Bed low and locked, non skid socks in place. Hourly rounding in place.

## 2023-05-07 NOTE — CARE PLAN
The patient is Watcher - Medium risk of patient condition declining or worsening    Shift Goals  Clinical Goals: Neuro checks, monitor vitals  Patient Goals: Rest    Progress made toward(s) clinical / shift goals:  Neuro checks completed, neuro intact, no n/t present. Vitals monitored, boluses administered per order    Patient is not progressing towards the following goals:

## 2023-05-07 NOTE — PROGRESS NOTES
Bedside report received from luz maria RN, assumed care at 1900.  Assessment complete.  A&O x 4. Patient calls appropriately. Neuro intact  Patient ambulates with x1 assist.   Patient has 5/10 pain. Pain managed with rest. BP too low for narcotic PRN, scheduled tylenol administered.  Denies N&V. tolerating full liquid diet  Surgical Mli with prevena.  + void via rehman catheter, - flatus, - BM.  Patient denies SOB. On room air  SCD's on.  Patient calm, pleasant, and cooperative.  Review plan with of care with patient. Call light and personal belongings within reach. Hourly rounding in place. All needs met at this time

## 2023-05-08 LAB
ALBUMIN SERPL BCP-MCNC: 3 G/DL (ref 3.2–4.9)
ALBUMIN/GLOB SERPL: 1.8 G/DL
ALP SERPL-CCNC: 59 U/L (ref 30–99)
ALT SERPL-CCNC: 22 U/L (ref 2–50)
ANION GAP SERPL CALC-SCNC: 7 MMOL/L (ref 7–16)
AST SERPL-CCNC: 25 U/L (ref 12–45)
BILIRUB SERPL-MCNC: 0.4 MG/DL (ref 0.1–1.5)
BUN SERPL-MCNC: 12 MG/DL (ref 8–22)
CALCIUM ALBUM COR SERPL-MCNC: 9.1 MG/DL (ref 8.5–10.5)
CALCIUM SERPL-MCNC: 8.3 MG/DL (ref 8.5–10.5)
CHLORIDE SERPL-SCNC: 106 MMOL/L (ref 96–112)
CO2 SERPL-SCNC: 25 MMOL/L (ref 20–33)
CREAT SERPL-MCNC: 0.53 MG/DL (ref 0.5–1.4)
ERYTHROCYTE [DISTWIDTH] IN BLOOD BY AUTOMATED COUNT: 43.4 FL (ref 35.9–50)
GFR SERPLBLD CREATININE-BSD FMLA CKD-EPI: 101 ML/MIN/1.73 M 2
GLOBULIN SER CALC-MCNC: 1.7 G/DL (ref 1.9–3.5)
GLUCOSE SERPL-MCNC: 92 MG/DL (ref 65–99)
HCT VFR BLD AUTO: 32.6 % (ref 37–47)
HGB BLD-MCNC: 11 G/DL (ref 12–16)
MCH RBC QN AUTO: 32.6 PG (ref 27–33)
MCHC RBC AUTO-ENTMCNC: 33.7 G/DL (ref 33.6–35)
MCV RBC AUTO: 96.7 FL (ref 81.4–97.8)
PLATELET # BLD AUTO: 182 K/UL (ref 164–446)
PMV BLD AUTO: 10 FL (ref 9–12.9)
POTASSIUM SERPL-SCNC: 3.8 MMOL/L (ref 3.6–5.5)
PROT SERPL-MCNC: 4.7 G/DL (ref 6–8.2)
RBC # BLD AUTO: 3.37 M/UL (ref 4.2–5.4)
SODIUM SERPL-SCNC: 138 MMOL/L (ref 135–145)
WBC # BLD AUTO: 5.4 K/UL (ref 4.8–10.8)

## 2023-05-08 PROCEDURE — C9113 INJ PANTOPRAZOLE SODIUM, VIA: HCPCS | Performed by: SURGERY

## 2023-05-08 PROCEDURE — 770001 HCHG ROOM/CARE - MED/SURG/GYN PRIV*

## 2023-05-08 PROCEDURE — 85027 COMPLETE CBC AUTOMATED: CPT

## 2023-05-08 PROCEDURE — A9270 NON-COVERED ITEM OR SERVICE: HCPCS | Performed by: SURGERY

## 2023-05-08 PROCEDURE — 36415 COLL VENOUS BLD VENIPUNCTURE: CPT

## 2023-05-08 PROCEDURE — 700111 HCHG RX REV CODE 636 W/ 250 OVERRIDE (IP): Performed by: SURGERY

## 2023-05-08 PROCEDURE — 80053 COMPREHEN METABOLIC PANEL: CPT

## 2023-05-08 PROCEDURE — 700102 HCHG RX REV CODE 250 W/ 637 OVERRIDE(OP): Performed by: SURGERY

## 2023-05-08 PROCEDURE — 700102 HCHG RX REV CODE 250 W/ 637 OVERRIDE(OP): Performed by: ANESTHESIOLOGY

## 2023-05-08 PROCEDURE — A9270 NON-COVERED ITEM OR SERVICE: HCPCS | Performed by: ANESTHESIOLOGY

## 2023-05-08 PROCEDURE — 700102 HCHG RX REV CODE 250 W/ 637 OVERRIDE(OP): Performed by: NURSE PRACTITIONER

## 2023-05-08 PROCEDURE — A9270 NON-COVERED ITEM OR SERVICE: HCPCS | Performed by: NURSE PRACTITIONER

## 2023-05-08 PROCEDURE — 700105 HCHG RX REV CODE 258: Performed by: NURSE PRACTITIONER

## 2023-05-08 PROCEDURE — 99024 POSTOP FOLLOW-UP VISIT: CPT | Performed by: NURSE PRACTITIONER

## 2023-05-08 PROCEDURE — 700105 HCHG RX REV CODE 258: Performed by: SURGERY

## 2023-05-08 RX ORDER — OMEPRAZOLE 20 MG/1
20 CAPSULE, DELAYED RELEASE ORAL DAILY
Status: DISCONTINUED | OUTPATIENT
Start: 2023-05-09 | End: 2023-05-11 | Stop reason: HOSPADM

## 2023-05-08 RX ORDER — METOCLOPRAMIDE 10 MG/1
5 TABLET ORAL
Status: DISCONTINUED | OUTPATIENT
Start: 2023-05-08 | End: 2023-05-11 | Stop reason: HOSPADM

## 2023-05-08 RX ORDER — GAUZE BANDAGE 2" X 2"
100 BANDAGE TOPICAL DAILY
Status: DISCONTINUED | OUTPATIENT
Start: 2023-05-08 | End: 2023-05-11 | Stop reason: HOSPADM

## 2023-05-08 RX ORDER — METOCLOPRAMIDE 10 MG/1
10 TABLET ORAL
Status: DISCONTINUED | OUTPATIENT
Start: 2023-05-08 | End: 2023-05-08

## 2023-05-08 RX ADMIN — Medication 100 MG: at 17:06

## 2023-05-08 RX ADMIN — CEFTRIAXONE SODIUM 1000 MG: 10 INJECTION, POWDER, FOR SOLUTION INTRAVENOUS at 14:56

## 2023-05-08 RX ADMIN — ACETAMINOPHEN 320 MG: 160 SUSPENSION ORAL at 21:07

## 2023-05-08 RX ADMIN — PANTOPRAZOLE SODIUM 40 MG: 40 INJECTION, POWDER, FOR SOLUTION INTRAVENOUS at 04:42

## 2023-05-08 RX ADMIN — ACETAMINOPHEN 320 MG: 160 SUSPENSION ORAL at 14:56

## 2023-05-08 RX ADMIN — SODIUM CHLORIDE, POTASSIUM CHLORIDE, SODIUM LACTATE AND CALCIUM CHLORIDE: 600; 310; 30; 20 INJECTION, SOLUTION INTRAVENOUS at 06:19

## 2023-05-08 RX ADMIN — SODIUM CHLORIDE, POTASSIUM CHLORIDE, SODIUM LACTATE AND CALCIUM CHLORIDE: 600; 310; 30; 20 INJECTION, SOLUTION INTRAVENOUS at 23:25

## 2023-05-08 RX ADMIN — ACETAMINOPHEN 320 MG: 160 SUSPENSION ORAL at 08:20

## 2023-05-08 RX ADMIN — RIVAROXABAN 10 MG: 10 TABLET, FILM COATED ORAL at 17:06

## 2023-05-08 RX ADMIN — CELECOXIB 200 MG: 200 CAPSULE ORAL at 11:38

## 2023-05-08 RX ADMIN — SODIUM CHLORIDE 250 ML: 9 INJECTION, SOLUTION INTRAVENOUS at 00:28

## 2023-05-08 ASSESSMENT — ENCOUNTER SYMPTOMS
VOMITING: 0
COUGH: 0
CHILLS: 0
WEIGHT LOSS: 1
SHORTNESS OF BREATH: 0
ABDOMINAL PAIN: 1
NAUSEA: 1
FEVER: 0

## 2023-05-08 ASSESSMENT — PAIN DESCRIPTION - PAIN TYPE
TYPE: ACUTE PAIN

## 2023-05-08 NOTE — PROGRESS NOTES
AA&Ox4. Denies CP/SOB.  Reporting 3/10 pain. Medicated per MAR.   Educated patient regarding pharmacologic and non pharmacologic modalities for pain management.  Skin per flowsheet.  Tolerating regular diet. Denies N/V.  + void. Last BM PTA, + flatus.  Pt ambulates x1 w/FWW.  All needs met at this time. Call light within reach. Pt calls appropriately. Bed low and locked, non skid socks in place. Hourly rounding in place.

## 2023-05-08 NOTE — DIETARY
"Nutrition services: Day 3 of admit.  Kathy Dumont is a 67 y.o. female with admitting DX of gastric outlet obstruction.    Consult received for BMI <19; severe protein-calorie malnutrition per problems list. Met with pt at bedside. Pt appeared very thin with severe fat loss evidenced by hollowed orbitals, prominent zygomatic arches, and mostly skin in upper arm region; severe muscle loss evidenced by squared shoulders, and prominent clavicles. Pt reports to ongoing wt loss over the past \"few years\". She reported a previous UBW of ~ 90 lbs, stating she has always been thin, but never this thin. Pt with hx of gastroparesis, she reports to early satiety, difficulty digesting red meat, raw vegetables and fibrous vegetables. Usual meals at home consist of fish, chicken, heavily steamed broccoli, and canned green beans. Nutrition Representative saw pt for meal choices. Pt sips on Boost Plus oral nutrition supplements to bolster nutrition. She is agreeable to receiving them here. RD will add supplement order and adjust menu accordingly (chocolate per pt preference).  Pt stated a desire to gain lost weight, but has had difficulty consuming enough calories.    Assessment:  Height: 160 cm (5' 3\")  Weight: 28 kg (61 lb 11.7 oz)  Body mass index is 10.93 kg/m²., BMI classification: underweight  Diet/Intake: Regular; PO <25-50% for three meals thus far    Evaluation:   Sinai-en-Y gastrojejunostomy bypass for gastric outlet obstruction 5/5.  Followed by GI. Hx of pancreatitis, gastroparesis.  Wt per chart review: 66 lbs 8/2/21. Wt loss of 8% > 1 year.  Reglan per MAR.   Pt may be at risk for refeeding given ongoing malnutrition. Requested 100 mg of Thiamine from APRN via [employer]. Currently awaiting response.    Malnutrition Risk: Pt with severe, chronic malnutrition related to chronic gastroparesis, early satiety and chronic pancreatitis, AEB physical markers for severe fat and severe muscle loss as noted " above.    Recommendations/Plan:  Small meals per pt preference.  Boost Plus in chocolate TID.  Encourage intake of meals and supplements.  Document intake of all meals and supplements as % taken in ADLs to provide interdisciplinary communication across all shifts.   Monitor weight.  Nutrition rep will continue to see patient for ongoing meal and snack preferences.   Monitor for refeeding: supplement 100 mg Thiamine x 7 days to reduce risk of refeeding    RD will continue to follow per dept guidelines.

## 2023-05-08 NOTE — PROGRESS NOTES
4 Eyes Skin Assessment Completed by YOUSUF [place] and YOUSUF Mendoza.    Head WDL  Ears WDL  Nose WDL  Mouth WDL  Neck WDL  Breast/Chest WDL  Shoulder Blades WDL  Spine WDL  (R) Arm/Elbow/Hand WDL, mepilex to elbow  (L) Arm/Elbow/Hand WDL, mepilex to elbow  Abdomen Incision, midline with Prevena in place  Groin WDL  Scrotum/Coccyx/Buttocks WDL, mepilex in place  (R) Leg WDL  (L) Leg WDL  (R) Heel/Foot/Toe WDL, mepilex to heel  (L) Heel/Foot/Toe WDL, mepilex to heel          Devices In Places Pulse Ox and SCD's      Interventions In Place Heel Mepilex, Sacral Mepilex, Pillows, Elbow Mepilex, Q2 Turns, Low Air Loss Mattress, and Heels Loaded W/Pillows    Possible Skin Injury No    Pictures Uploaded Into Epic N/A  Wound Consult Placed N/A  RN Wound Prevention Protocol Ordered No

## 2023-05-08 NOTE — CARE PLAN
Problem: Pain - Standard  Goal: Alleviation of pain or a reduction in pain to the patient’s comfort goal  Outcome: Progressing     Problem: Knowledge Deficit - Standard  Goal: Patient and family/care givers will demonstrate understanding of plan of care, disease process/condition, diagnostic tests and medications  Outcome: Progressing   The patient is Stable - Low risk of patient condition declining or worsening    Shift Goals  Clinical Goals: monitor BP  Patient Goals: rest    Progress made toward(s) clinical / shift goals:  pts pain managed with prn pain medication.  POC discussed with pt, pt verbalized understanding of plan.

## 2023-05-08 NOTE — PROGRESS NOTES
Surgery General Daily Progress Note     Date of Service  May 8, 2023     Chief Complaint  1.  Gastric outlet obstruction.  2.  Malnutrition.  3.  Nausea and vomiting.  4.  Unintended weight loss.     Surgery Completed  PROCEDURE:  Sinai-en-Y gastrojejunostomy.    Hospital Course  POD # 3     Interval Problem Update  No cute events  Given NS bolus overnight due to mild hypertension / low UOP   Patient reports continued nausea, mild abd fullness, and low PO intake  Gunn DCed yesterday  OOB multiple times yesterday    Problem List  Principal Problem:    Gastric outlet obstruction POA: Yes  Active Problems:    Gastroparesis POA: Yes    Pancreatitis, chronic (HCC) POA: Yes    Eating disorder POA: Yes      Overview: Formatting of this note might be different from the original.      Anorexia nervosa    Delayed gastric emptying POA: Yes    Underweight due to inadequate caloric intake POA: Yes    Partial gastric outlet obstruction POA: Yes    Severe protein-calorie malnutrition (HCC) POA: Unknown  Resolved Problems:    * No resolved hospital problems. *     Subjective  Review of Systems   Constitutional:  Positive for malaise/fatigue and weight loss. Negative for chills and fever.   Respiratory:  Negative for cough and shortness of breath.    Cardiovascular:  Negative for chest pain.   Gastrointestinal:  Positive for abdominal pain and nausea. Negative for vomiting.   All other systems reviewed and are negative.      Objective  Temp:  [36.5 °C (97.7 °F)-37.4 °C (99.3 °F)] 36.9 °C (98.4 °F)  Pulse:  [50-60] 50  Resp:  [15-16] 16  BP: (82-95)/(48-52) 93/51  SpO2:  [92 %-95 %] 92 %      Physical Exam  Vitals and nursing note reviewed.   Constitutional:       General: She is not in acute distress.     Appearance: She is underweight. She is ill-appearing.   HENT:      Head: Normocephalic and atraumatic.      Nose: Nose normal.      Mouth/Throat:      Pharynx: Oropharynx is clear.   Eyes:      Conjunctiva/sclera: Conjunctivae  normal.   Cardiovascular:      Rate and Rhythm: Normal rate.   Pulmonary:      Effort: Pulmonary effort is normal. No respiratory distress.   Abdominal:      General: There is no distension.      Palpations: Abdomen is soft.      Tenderness: There is no abdominal tenderness.      Comments: Abdminal midline incision, non tender, Prevena dressing intact   Skin:     General: Skin is warm and dry.   Neurological:      Mental Status: She is alert and oriented to person, place, and time.   Psychiatric:         Mood and Affect: Mood normal.         Behavior: Behavior normal.        Fluids  Intake/Output Summary (Last 24 hours) at 5/8/2023 0814  Last data filed at 5/8/2023 0334  Gross per 24 hour   Intake 5621.29 ml   Output 300 ml   Net 5321.29 ml         Labs  Lab Results   Component Value Date/Time    SODIUM 138 05/08/2023 06:17 AM    POTASSIUM 3.8 05/08/2023 06:17 AM    CHLORIDE 106 05/08/2023 06:17 AM    CO2 25 05/08/2023 06:17 AM    GLUCOSE 92 05/08/2023 06:17 AM    BUN 12 05/08/2023 06:17 AM    CREATININE 0.53 05/08/2023 06:17 AM         Lab Results   Component Value Date/Time    WBC 5.4 05/08/2023 06:17 AM    RBC 3.37 (L) 05/08/2023 06:17 AM    HEMOGLOBIN 11.0 (L) 05/08/2023 06:17 AM    HEMATOCRIT 32.6 (L) 05/08/2023 06:17 AM    MCV 96.7 05/08/2023 06:17 AM    MCH 32.6 05/08/2023 06:17 AM    MCHC 33.7 05/08/2023 06:17 AM    MPV 10.0 05/08/2023 06:17 AM    NEUTSPOLYS 83.80 (H) 05/07/2023 10:06 AM    LYMPHOCYTES 13.10 (L) 05/07/2023 10:06 AM    MONOCYTES 2.20 05/07/2023 10:06 AM    EOSINOPHILS 0.20 05/07/2023 10:06 AM    BASOPHILS 0.20 05/07/2023 10:06 AM         Recent Labs     05/06/23  0148 05/07/23  0926 05/08/23  0617   ASTSGOT 32 27 25   ALTSGPT 33 21 22   TBILIRUBIN 0.6 0.5 0.4   GLOBULIN 1.7* 1.8* 1.7*         Assessment/Plan  Pain controlled on PO/PRN  Add scheduled Reglan for appetite stimulant / anti emetic  Reduce IV rate  Minimize NS boluses  Consider low dose DOAC for DVT prophylaxis      DC planning  tentative plan home on Thursday, after return of bowel function       Kevyn Newby MS, MPH, NP  Surgical Oncology  May 8, 2023, 8:14 AM

## 2023-05-08 NOTE — PROGRESS NOTES
Report received from Zaira TO, assumed care at 1900  A0x4  Pt declines any SOB on room air, chest pain, new onset of numbness/tingling  Pt rates pain at 4/10, on a scale of 1-10, pt declines intervention at this time.  + voiding   Pt has + flatus, + bowel sounds, BM PTA  Pt ambulates with a standby assist and FWW  Pt is tolerating a regular diet, pt denies any nausea/vomiting, poor PO intake   MLI with prevena wound vac in place  Plan of care discussed, all questions answered.Call light is within reach, treaded slipper socks on, bed in lowest/locked position, hourly rounding in place, q2 turns in place, all needs met at this time.

## 2023-05-08 NOTE — CARE PLAN
The patient is Stable - Low risk of patient condition declining or worsening    Shift Goals  Clinical Goals: nutrition; Q2 turns  Patient Goals: comfort    Progress made toward(s) clinical / shift goals:       Patient is not progressing towards the following goals:

## 2023-05-09 LAB
ALBUMIN SERPL BCP-MCNC: 2.6 G/DL (ref 3.2–4.9)
ALBUMIN/GLOB SERPL: 1.5 G/DL
ALP SERPL-CCNC: 56 U/L (ref 30–99)
ALT SERPL-CCNC: 19 U/L (ref 2–50)
ANION GAP SERPL CALC-SCNC: 7 MMOL/L (ref 7–16)
AST SERPL-CCNC: 27 U/L (ref 12–45)
BILIRUB SERPL-MCNC: 0.3 MG/DL (ref 0.1–1.5)
BUN SERPL-MCNC: 14 MG/DL (ref 8–22)
CALCIUM ALBUM COR SERPL-MCNC: 9.1 MG/DL (ref 8.5–10.5)
CALCIUM SERPL-MCNC: 8 MG/DL (ref 8.5–10.5)
CHLORIDE SERPL-SCNC: 107 MMOL/L (ref 96–112)
CO2 SERPL-SCNC: 25 MMOL/L (ref 20–33)
CREAT SERPL-MCNC: 0.51 MG/DL (ref 0.5–1.4)
ERYTHROCYTE [DISTWIDTH] IN BLOOD BY AUTOMATED COUNT: 41.3 FL (ref 35.9–50)
GFR SERPLBLD CREATININE-BSD FMLA CKD-EPI: 102 ML/MIN/1.73 M 2
GLOBULIN SER CALC-MCNC: 1.7 G/DL (ref 1.9–3.5)
GLUCOSE SERPL-MCNC: 107 MG/DL (ref 65–99)
HCT VFR BLD AUTO: 28.8 % (ref 37–47)
HGB BLD-MCNC: 10 G/DL (ref 12–16)
MCH RBC QN AUTO: 32.5 PG (ref 27–33)
MCHC RBC AUTO-ENTMCNC: 34.7 G/DL (ref 33.6–35)
MCV RBC AUTO: 93.5 FL (ref 81.4–97.8)
PLATELET # BLD AUTO: 175 K/UL (ref 164–446)
PMV BLD AUTO: 9.6 FL (ref 9–12.9)
POTASSIUM SERPL-SCNC: 3.7 MMOL/L (ref 3.6–5.5)
PROT SERPL-MCNC: 4.3 G/DL (ref 6–8.2)
RBC # BLD AUTO: 3.08 M/UL (ref 4.2–5.4)
SODIUM SERPL-SCNC: 139 MMOL/L (ref 135–145)
WBC # BLD AUTO: 4.4 K/UL (ref 4.8–10.8)

## 2023-05-09 PROCEDURE — 80053 COMPREHEN METABOLIC PANEL: CPT

## 2023-05-09 PROCEDURE — 770001 HCHG ROOM/CARE - MED/SURG/GYN PRIV*

## 2023-05-09 PROCEDURE — A9270 NON-COVERED ITEM OR SERVICE: HCPCS | Performed by: NURSE PRACTITIONER

## 2023-05-09 PROCEDURE — 700111 HCHG RX REV CODE 636 W/ 250 OVERRIDE (IP): Performed by: SURGERY

## 2023-05-09 PROCEDURE — 97530 THERAPEUTIC ACTIVITIES: CPT

## 2023-05-09 PROCEDURE — 700102 HCHG RX REV CODE 250 W/ 637 OVERRIDE(OP): Performed by: ANESTHESIOLOGY

## 2023-05-09 PROCEDURE — 97165 OT EVAL LOW COMPLEX 30 MIN: CPT

## 2023-05-09 PROCEDURE — 85027 COMPLETE CBC AUTOMATED: CPT

## 2023-05-09 PROCEDURE — 99024 POSTOP FOLLOW-UP VISIT: CPT | Performed by: NURSE PRACTITIONER

## 2023-05-09 PROCEDURE — 700105 HCHG RX REV CODE 258: Performed by: NURSE PRACTITIONER

## 2023-05-09 PROCEDURE — 700102 HCHG RX REV CODE 250 W/ 637 OVERRIDE(OP): Performed by: NURSE PRACTITIONER

## 2023-05-09 PROCEDURE — 99024 POSTOP FOLLOW-UP VISIT: CPT | Performed by: SURGERY

## 2023-05-09 PROCEDURE — 36415 COLL VENOUS BLD VENIPUNCTURE: CPT

## 2023-05-09 PROCEDURE — A9270 NON-COVERED ITEM OR SERVICE: HCPCS | Performed by: ANESTHESIOLOGY

## 2023-05-09 PROCEDURE — 700102 HCHG RX REV CODE 250 W/ 637 OVERRIDE(OP): Performed by: SURGERY

## 2023-05-09 PROCEDURE — A9270 NON-COVERED ITEM OR SERVICE: HCPCS | Performed by: SURGERY

## 2023-05-09 RX ORDER — DEXTROSE AND SODIUM CHLORIDE 5; .45 G/100ML; G/100ML
INJECTION, SOLUTION INTRAVENOUS CONTINUOUS
Status: DISCONTINUED | OUTPATIENT
Start: 2023-05-09 | End: 2023-05-10

## 2023-05-09 RX ADMIN — RIVAROXABAN 10 MG: 10 TABLET, FILM COATED ORAL at 17:48

## 2023-05-09 RX ADMIN — ACETAMINOPHEN 320 MG: 160 SUSPENSION ORAL at 09:08

## 2023-05-09 RX ADMIN — Medication 100 MG: at 05:04

## 2023-05-09 RX ADMIN — DEXTROSE AND SODIUM CHLORIDE: 5; 450 INJECTION, SOLUTION INTRAVENOUS at 10:04

## 2023-05-09 RX ADMIN — CEFTRIAXONE SODIUM 1000 MG: 10 INJECTION, POWDER, FOR SOLUTION INTRAVENOUS at 15:41

## 2023-05-09 RX ADMIN — OXYCODONE 5 MG: 5 TABLET ORAL at 20:06

## 2023-05-09 RX ADMIN — ACETAMINOPHEN 320 MG: 160 SUSPENSION ORAL at 20:01

## 2023-05-09 RX ADMIN — ACETAMINOPHEN 320 MG: 160 SUSPENSION ORAL at 15:41

## 2023-05-09 RX ADMIN — OMEPRAZOLE 20 MG: 20 CAPSULE, DELAYED RELEASE ORAL at 05:04

## 2023-05-09 RX ADMIN — DEXTROSE AND SODIUM CHLORIDE: 5; 450 INJECTION, SOLUTION INTRAVENOUS at 23:46

## 2023-05-09 RX ADMIN — CELECOXIB 200 MG: 200 CAPSULE ORAL at 13:49

## 2023-05-09 ASSESSMENT — COGNITIVE AND FUNCTIONAL STATUS - GENERAL
WALKING IN HOSPITAL ROOM: A LITTLE
SUGGESTED CMS G CODE MODIFIER DAILY ACTIVITY: CJ
MOBILITY SCORE: 21
HELP NEEDED FOR BATHING: A LITTLE
SUGGESTED CMS G CODE MODIFIER MOBILITY: CJ
STANDING UP FROM CHAIR USING ARMS: A LITTLE
PERSONAL GROOMING: A LITTLE
TOILETING: A LITTLE
CLIMB 3 TO 5 STEPS WITH RAILING: A LITTLE
DAILY ACTIVITIY SCORE: 21

## 2023-05-09 ASSESSMENT — PAIN DESCRIPTION - PAIN TYPE
TYPE: ACUTE PAIN;SURGICAL PAIN
TYPE: ACUTE PAIN
TYPE: ACUTE PAIN
TYPE: ACUTE PAIN;SURGICAL PAIN
TYPE: ACUTE PAIN
TYPE: ACUTE PAIN;SURGICAL PAIN

## 2023-05-09 ASSESSMENT — ENCOUNTER SYMPTOMS
VOMITING: 0
WEIGHT LOSS: 1
CHILLS: 0
FEVER: 0
NAUSEA: 1
ABDOMINAL PAIN: 1
COUGH: 0

## 2023-05-09 ASSESSMENT — GAIT ASSESSMENTS
DISTANCE (FEET): 175
DEVIATION: BRADYKINETIC
GAIT LEVEL OF ASSIST: SUPERVISED

## 2023-05-09 ASSESSMENT — ACTIVITIES OF DAILY LIVING (ADL): TOILETING: INDEPENDENT

## 2023-05-09 NOTE — THERAPY
Physical Therapy   Daily Treatment     Patient Name: Kathy Dumont  Age:  67 y.o., Sex:  female  Medical Record #: [id number]  Today's Date: 5/9/2023     Precautions  Precautions: Fall Risk  Comments: abdominal precautions, prevena    Assessment    Patient agreeable to PT tx session.  She progressed to meet all acute PT goals, ambulating ~175 ft in hallway without AD and negotiating 3 stairs without difficulty.  Educated on compensatory strategies for stair negotiation due to patient report of L hip pain/issues since fracture.  Recommend home health PT.      Plan    Treatment Plan Status:    Type of Treatment: Bed Mobility, Equipment, Gait Training, Manual Therapy, Neuro Re-Education / Balance, Self Care / Home Evaluation, Stair Training, Therapeutic Activities, Therapeutic Exercise  Treatment Frequency: 3 Times per Week  Treatment Duration: Until Therapy Goals Met    DC Equipment Recommendations: None  Discharge Recommendations: Recommend home health for continued physical therapy services     Objective     05/09/23 1526   Precautions   Precautions Fall Risk   Comments abdominal precautions, prevena   Pain 0 - 10 Group   Therapist Pain Assessment Post Activity Pain Same as Prior to Activity;Nurse Notified;0   Cognition    Cognition / Consciousness WDL   Level of Consciousness Alert   Comments Very pleasant & cooperative   Balance   Sitting Balance (Static) Good   Sitting Balance (Dynamic) Fair +   Standing Balance (Static) Fair   Standing Balance (Dynamic) Fair   Weight Shift Sitting Good   Weight Shift Standing Fair   Skilled Intervention Verbal Cuing;Tactile Cuing   Bed Mobility    Supine to Sit Supervised   Sit to Supine Supervised   Scooting Supervised   Rolling Supervised   Skilled Intervention Verbal Cuing;Tactile Cuing   Gait Analysis   Gait Level Of Assist Supervised   Assistive Device None   Distance (Feet) 175   # of Times Distance was Traveled 1   Deviation Bradykinetic   # of Stairs Climbed 3    Level of Assist with Stairs Supervised   Weight Bearing Status No restrictions   Skilled Intervention Verbal Cuing;Tactile Cuing;Compensatory Strategies   Functional Mobility   Sit to Stand Supervised   Bed, Chair, Wheelchair Transfer Supervised   Toilet Transfers Supervised   Transfer Method Stand Step   Mobility ambulation, stairs   Skilled Intervention Verbal Cuing;Tactile Cuing   Activity Tolerance   Standing 10 min   Short Term Goals    Short Term Goal # 1 Pt will perform supine<>sit from flat HOB/no railing with supervision wtihin 6 visits to ensure independent mobility at home.   Goal Outcome # 1 Goal met   Short Term Goal # 2 Pt will perform sit<>stand with no device and supervision within 6 visits to ensure independent mobility at home.   Goal Outcome # 2 Goal met   Short Term Goal # 3 Pt will ambulate x 150ft wihthout device and supervision wtihin 6 visits to ensure independent mobility ath ome.   Goal Outcome # 3 Goal met   Short Term Goal # 4 Pt will ascend/descend 2 steps to enter/exit home wiht B UE support and supervision wtihin 6 visits to ensure independnet moblity ath ome.   Goal Outcome # 4 Goal met

## 2023-05-09 NOTE — PROGRESS NOTES
Bedside report received.  Assessment complete.    A&O x 4. Patient calls appropriately.    Patient ambulates with standby assist with FFW.     Patient has 5/10 pain. Pain managed with prescribed scheduled medications.    Denies N&V. Tolerating regular diet, poor intake.    MLI with a prevena wound vac.    + void, + flatus, - BM, last BM PTA.    Patient denies SOB.    SCD's on.    Review plan with of care with patient. Call light and personal belongings within reach. Hourly rounding in place. All needs met at this time.

## 2023-05-09 NOTE — PROGRESS NOTES
Surgery General Daily Progress Note     Date of Service  May 9, 2023     Chief Complaint  1.  Gastric outlet obstruction.  2.  Malnutrition.  3.  Nausea and vomiting.  4.  Unintended weight loss.     Surgery Completed  PROCEDURE:  Sinai-en-Y gastrojejunostomy.     Hospital Course  POD # 4     Interval Problem Update  No acute events  Patient reports continued low appetite, mild abd fullness, and low PO intake  Added Reglan and Thiamine  Gunn DCed yesterday  OOB briefly yesterday, uncomfortable when in chair    Problem List  Principal Problem:    Gastric outlet obstruction POA: Yes  Active Problems:    Gastroparesis POA: Yes    Pancreatitis, chronic (HCC) POA: Yes    Eating disorder POA: Yes      Overview: Formatting of this note might be different from the original.      Anorexia nervosa    Delayed gastric emptying POA: Yes    Underweight due to inadequate caloric intake POA: Yes    Partial gastric outlet obstruction POA: Yes    Severe protein-calorie malnutrition (HCC) POA: Unknown  Resolved Problems:    * No resolved hospital problems. *       Subjective  Review of Systems   Constitutional:  Positive for malaise/fatigue and weight loss. Negative for chills and fever.   Respiratory:  Negative for cough.    Cardiovascular:  Negative for chest pain.   Gastrointestinal:  Positive for abdominal pain and nausea. Negative for vomiting.       Objective  Temp:  [36.3 °C (97.3 °F)-37.1 °C (98.8 °F)] 36.3 °C (97.3 °F)  Pulse:  [54-66] 65  Resp:  [17-18] 18  BP: ()/(55-74) 97/60  SpO2:  [93 %-96 %] 96 %      Physical Exam  Vitals reviewed.   Constitutional:       General: She is not in acute distress.     Appearance: Normal appearance. She is underweight. She is ill-appearing.   HENT:      Head: Normocephalic and atraumatic.      Nose: Nose normal.      Mouth/Throat:      Pharynx: Oropharynx is clear.   Eyes:      Conjunctiva/sclera: Conjunctivae normal.   Cardiovascular:      Rate and Rhythm: Normal rate.   Pulmonary:       Effort: Pulmonary effort is normal. No respiratory distress.   Abdominal:      Palpations: Abdomen is soft.      Comments: Midline incision, Prevena, non-tender   Skin:     General: Skin is warm and dry.      Coloration: Skin is not jaundiced.   Neurological:      Mental Status: She is alert and oriented to person, place, and time.   Psychiatric:         Mood and Affect: Mood is depressed.         Behavior: Behavior normal.        Fluids  Intake/Output Summary (Last 24 hours) at 5/9/2023 0802  Last data filed at 5/8/2023 2334  Gross per 24 hour   Intake 1350.29 ml   Output --   Net 1350.29 ml         Labs  Lab Results   Component Value Date/Time    SODIUM 139 05/09/2023 01:49 AM    POTASSIUM 3.7 05/09/2023 01:49 AM    CHLORIDE 107 05/09/2023 01:49 AM    CO2 25 05/09/2023 01:49 AM    GLUCOSE 107 (H) 05/09/2023 01:49 AM    BUN 14 05/09/2023 01:49 AM    CREATININE 0.51 05/09/2023 01:49 AM         No results found for: PROTHROMBTM, INR      Lab Results   Component Value Date/Time    WBC 4.4 (L) 05/09/2023 01:49 AM    RBC 3.08 (L) 05/09/2023 01:49 AM    HEMOGLOBIN 10.0 (L) 05/09/2023 01:49 AM    HEMATOCRIT 28.8 (L) 05/09/2023 01:49 AM    MCV 93.5 05/09/2023 01:49 AM    MCH 32.5 05/09/2023 01:49 AM    MCHC 34.7 05/09/2023 01:49 AM    MPV 9.6 05/09/2023 01:49 AM    NEUTSPOLYS 83.80 (H) 05/07/2023 10:06 AM    LYMPHOCYTES 13.10 (L) 05/07/2023 10:06 AM    MONOCYTES 2.20 05/07/2023 10:06 AM    EOSINOPHILS 0.20 05/07/2023 10:06 AM    BASOPHILS 0.20 05/07/2023 10:06 AM         Recent Labs     05/06/23  0148 05/07/23  0926 05/08/23  0617 05/09/23  0149   ASTSGOT 32 27 25 27   ALTSGPT 33 21 22 19   TBILIRUBIN 0.6 0.5 0.4 0.3   GLOBULIN 1.7* 1.8* 1.7* 1.7*         Assessment/Plan  Pain controlled on PO/PRN  Encourage finding food items that are appealing to patient - appreciate RD assistance  Continue OOB and ambulate     Kevyn Newby MS, MPH, NP  Surgical Oncology  May 9, 2023, 8:02 AM

## 2023-05-09 NOTE — CARE PLAN
The patient is Stable - Low risk of patient condition declining or worsening    Problem: Pain - Standard  Goal: Alleviation of pain or a reduction in pain to the patient’s comfort goal  Outcome: Progressing     Problem: Knowledge Deficit - Standard  Goal: Patient and family/care givers will demonstrate understanding of plan of care, disease process/condition, diagnostic tests and medications  Outcome: Progressing     Problem: Skin Integrity  Goal: Skin integrity is maintained or improved  Outcome: Progressing     Shift Goals  Clinical Goals: Q2 turns, BM  Patient Goals: rest, comfort    Progress made toward(s) clinical / shift goals:  Patient's pain managed per MAR. This patient was able to rest during this shift.    Patient is not progressing towards the following goals:

## 2023-05-09 NOTE — THERAPY
Occupational Therapy   Initial Evaluation     Patient Name: Kathy Dumont  Age:  67 y.o., Sex:  female  Medical Record #: [id number]  Today's Date: 5/9/2023     Precautions  Precautions: Fall Risk  Comments: abdominal precautions    Assessment  Patient is 67 y.o. female s/p Sinai-en-Y gastrojejunostomy d/t partial gastric outlet obstruction. PMHx of malnutrition, anorexia nervosa, and chronic pancreatitis. Completed ADLs/txfs with SPV and functional ambulation w/o AD in room and short distance in hallway. Reports lives with  who is able to assist PRN when not working. Reports he works a short distance away and plans to check on her during lunch. Reports she will be alone while he's working ~8 hrs/day. Patient will not be actively followed for occupational therapy services at this time, however may be seen if requested by physician for 1 more visit within 30 days to address any discharge or equipment needs.     Plan    Occupational Therapy Initial Treatment Plan   Duration: Discharge Needs Only    DC Equipment Recommendations: Tub / Shower Seat  Discharge Recommendations: Recommend home health for continued occupational therapy services      Objective     05/09/23 0941   Prior Living Situation   Prior Services Home-Independent   Housing / Facility 1 Story House   Steps Into Home 2   Bathroom Set up Bathtub / Shower Combination   Equipment Owned None   Lives with - Patient's Self Care Capacity Spouse   Comments Reports lives with  who is able to assist PRN when not working. Reports he works a short distance away and plans to check on her during lunch. Reports she will be alone while he's working ~8 hrs/day   Prior Level of ADL Function   Self Feeding Independent   Grooming / Hygiene Independent   Bathing Independent   Dressing Independent   Toileting Independent   Prior Level of IADL Function   Medication Management Independent   Laundry Independent   Kitchen Mobility Independent   Finances  Independent   Home Management Independent   Shopping Independent   Prior Level Of Mobility Independent Without Device in Community;Independent Without Device in Home   Driving / Transportation Driving Independent   Precautions   Precautions Fall Risk   Comments abdominal precautions   Vitals   O2 Delivery Device None - Room Air   Vitals Comments no c/o dizziness this session   Pain 0 - 10 Group   Location Abdomen   Location Orientation Mid   Therapist Pain Assessment Post Activity;During Activity;Nurse Notified  (not quantified)   Cognition    Cognition / Consciousness WDL   Level of Consciousness Alert   Comments very pleasant and cooperative; receptive to education   Passive ROM Upper Body   Passive ROM Upper Body WDL   Active ROM Upper Body   Active ROM Upper Body  WDL   Strength Upper Body   Upper Body Strength  WDL   Comments functional for light ADLs   Balance Assessment   Sitting Balance (Static) Good   Sitting Balance (Dynamic) Fair +   Standing Balance (Static) Fair   Standing Balance (Dynamic) Fair   Weight Shift Sitting Good   Weight Shift Standing Fair   Comments w/o AD in room   Bed Mobility    Supine to Sit Supervised   Sit to Supine Supervised   Scooting Supervised   Rolling Supervised   Comments HOB slightly elevated; v/cs on log roll   ADL Assessment   Eating Supervision   Grooming Supervision;Standing   Lower Body Dressing Supervision   Toileting Supervision   Functional Mobility   Sit to Stand Supervised   Bed, Chair, Wheelchair Transfer Supervised   Toilet Transfers Supervised   Transfer Method Stand Step   Mobility w/FWW in room and hallway   Edema / Skin Assessment   Edema / Skin  Not Assessed   Comments prevena in place pre/post   Activity Tolerance   Comments no c/o fatigue   Education Group   Education Provided Role of Occupational Therapist;Transfers;Pathology of bedrest;Activities of Daily Living;Adaptive Equipment;Home Safety   Role of Occupational Therapist Patient Response  Patient;Acceptance;Explanation;Verbal Demonstration;Reinforcement Needed   Home Safety Patient Response Patient;Acceptance;Explanation;Verbal Demonstration;Reinforcement Needed   Transfers Patient Response Patient;Acceptance;Explanation;Reinforcement Needed;Action Demonstration   ADL Patient Response Patient;Acceptance;Explanation;Verbal Demonstration;Reinforcement Needed   Adaptive Equipment Patient Response Patient;Acceptance;Explanation;Verbal Demonstration;Reinforcement Needed   Pathology of Bedrest Patient Response Patient;Acceptance;Explanation;Verbal Demonstration;Reinforcement Needed

## 2023-05-09 NOTE — PROGRESS NOTES
4 Eyes Skin Assessment Completed by YOUSUF [place] and YOUSUF [place]l.     Head WDL  Ears WDL  Nose WDL  Mouth WDL  Neck WDL  Breast/Chest WDL  Shoulder Blades WDL  Spine WDL  (R) Arm/Elbow/Hand WDL, mepilex to elbow  (L) Arm/Elbow/Hand WDL, mepilex to elbow  Abdomen Incision, midline with Prevena in place  Groin WDL  Scrotum/Coccyx/Buttocks WDL, mepilex in place  (R) Leg WDL  (L) Leg WDL  (R) Heel/Foot/Toe WDL, mepilex to heel  (L) Heel/Foot/Toe WDL, mepilex to heel              Devices In Places Pulse Ox and SCD's        Interventions In Place Heel Mepilex, Sacral Mepilex, Pillows, Elbow Mepilex, Q2 Turns, Low Air Loss Mattress, and Heels Loaded W/Pillows     Possible Skin Injury No     Pictures Uploaded Into Epic N/A  Wound Consult Placed N/A  RN Wound Prevention Protocol Ordered No

## 2023-05-10 ENCOUNTER — PATIENT OUTREACH (OUTPATIENT)
Dept: SCHEDULING | Facility: IMAGING CENTER | Age: 68
End: 2023-05-10
Payer: MEDICARE

## 2023-05-10 PROBLEM — K31.1 PARTIAL GASTRIC OUTLET OBSTRUCTION: Status: RESOLVED | Noted: 2023-05-02 | Resolved: 2023-05-10

## 2023-05-10 LAB
ALBUMIN SERPL BCP-MCNC: 2.6 G/DL (ref 3.2–4.9)
ALBUMIN/GLOB SERPL: 1.4 G/DL
ALP SERPL-CCNC: 57 U/L (ref 30–99)
ALT SERPL-CCNC: 19 U/L (ref 2–50)
ANION GAP SERPL CALC-SCNC: 9 MMOL/L (ref 7–16)
AST SERPL-CCNC: 24 U/L (ref 12–45)
BILIRUB SERPL-MCNC: 0.3 MG/DL (ref 0.1–1.5)
BUN SERPL-MCNC: 10 MG/DL (ref 8–22)
CALCIUM ALBUM COR SERPL-MCNC: 8.9 MG/DL (ref 8.5–10.5)
CALCIUM SERPL-MCNC: 7.8 MG/DL (ref 8.5–10.5)
CHLORIDE SERPL-SCNC: 108 MMOL/L (ref 96–112)
CO2 SERPL-SCNC: 25 MMOL/L (ref 20–33)
CREAT SERPL-MCNC: 0.53 MG/DL (ref 0.5–1.4)
ERYTHROCYTE [DISTWIDTH] IN BLOOD BY AUTOMATED COUNT: 42.7 FL (ref 35.9–50)
GFR SERPLBLD CREATININE-BSD FMLA CKD-EPI: 101 ML/MIN/1.73 M 2
GLOBULIN SER CALC-MCNC: 1.8 G/DL (ref 1.9–3.5)
GLUCOSE SERPL-MCNC: 105 MG/DL (ref 65–99)
HCT VFR BLD AUTO: 28.7 % (ref 37–47)
HGB BLD-MCNC: 9.7 G/DL (ref 12–16)
MCH RBC QN AUTO: 32.7 PG (ref 27–33)
MCHC RBC AUTO-ENTMCNC: 33.8 G/DL (ref 33.6–35)
MCV RBC AUTO: 96.6 FL (ref 81.4–97.8)
PLATELET # BLD AUTO: 199 K/UL (ref 164–446)
PMV BLD AUTO: 9.9 FL (ref 9–12.9)
POTASSIUM SERPL-SCNC: 3.4 MMOL/L (ref 3.6–5.5)
PROT SERPL-MCNC: 4.4 G/DL (ref 6–8.2)
RBC # BLD AUTO: 2.97 M/UL (ref 4.2–5.4)
SODIUM SERPL-SCNC: 142 MMOL/L (ref 135–145)
WBC # BLD AUTO: 3.6 K/UL (ref 4.8–10.8)

## 2023-05-10 PROCEDURE — 700102 HCHG RX REV CODE 250 W/ 637 OVERRIDE(OP): Performed by: NURSE PRACTITIONER

## 2023-05-10 PROCEDURE — A9270 NON-COVERED ITEM OR SERVICE: HCPCS | Performed by: ANESTHESIOLOGY

## 2023-05-10 PROCEDURE — 700102 HCHG RX REV CODE 250 W/ 637 OVERRIDE(OP): Performed by: ANESTHESIOLOGY

## 2023-05-10 PROCEDURE — 770001 HCHG ROOM/CARE - MED/SURG/GYN PRIV*

## 2023-05-10 PROCEDURE — 700102 HCHG RX REV CODE 250 W/ 637 OVERRIDE(OP): Performed by: SURGERY

## 2023-05-10 PROCEDURE — 85027 COMPLETE CBC AUTOMATED: CPT

## 2023-05-10 PROCEDURE — A9270 NON-COVERED ITEM OR SERVICE: HCPCS | Performed by: SURGERY

## 2023-05-10 PROCEDURE — 36415 COLL VENOUS BLD VENIPUNCTURE: CPT

## 2023-05-10 PROCEDURE — 99024 POSTOP FOLLOW-UP VISIT: CPT | Performed by: NURSE PRACTITIONER

## 2023-05-10 PROCEDURE — 80053 COMPREHEN METABOLIC PANEL: CPT

## 2023-05-10 PROCEDURE — A9270 NON-COVERED ITEM OR SERVICE: HCPCS | Performed by: NURSE PRACTITIONER

## 2023-05-10 RX ORDER — HYDROCODONE BITARTRATE AND ACETAMINOPHEN 5; 325 MG/1; MG/1
1 TABLET ORAL EVERY 4 HOURS PRN
Qty: 20 TABLET | Refills: 0 | Status: SHIPPED | OUTPATIENT
Start: 2023-05-10 | End: 2023-05-21

## 2023-05-10 RX ORDER — CELECOXIB 200 MG/1
200 CAPSULE ORAL
Qty: 7 CAPSULE | Refills: 0 | Status: SHIPPED | OUTPATIENT
Start: 2023-05-11 | End: 2023-05-18

## 2023-05-10 RX ORDER — LANOLIN ALCOHOL/MO/W.PET/CERES
100 CREAM (GRAM) TOPICAL DAILY
Qty: 15 TABLET | Refills: 0 | Status: SHIPPED | OUTPATIENT
Start: 2023-05-11 | End: 2023-05-25

## 2023-05-10 RX ORDER — DOCUSATE SODIUM 100 MG/1
100 CAPSULE, LIQUID FILLED ORAL 2 TIMES DAILY
Qty: 20 CAPSULE | Refills: 0 | Status: SHIPPED | OUTPATIENT
Start: 2023-05-10 | End: 2023-05-20

## 2023-05-10 RX ORDER — OMEPRAZOLE 20 MG/1
20 CAPSULE, DELAYED RELEASE ORAL DAILY
Qty: 30 CAPSULE | Refills: 0 | Status: SHIPPED | OUTPATIENT
Start: 2023-05-11 | End: 2023-06-10

## 2023-05-10 RX ORDER — OXYCODONE HYDROCHLORIDE 5 MG/1
5 TABLET ORAL EVERY 6 HOURS PRN
Qty: 30 TABLET | Refills: 0 | Status: SHIPPED | OUTPATIENT
Start: 2023-05-10 | End: 2023-05-10

## 2023-05-10 RX ORDER — POTASSIUM CHLORIDE 20 MEQ/1
20 TABLET, EXTENDED RELEASE ORAL DAILY
Status: DISCONTINUED | OUTPATIENT
Start: 2023-05-10 | End: 2023-05-11 | Stop reason: HOSPADM

## 2023-05-10 RX ORDER — METOCLOPRAMIDE 5 MG/1
5 TABLET ORAL
Qty: 40 TABLET | Refills: 0 | Status: SHIPPED | OUTPATIENT
Start: 2023-05-10 | End: 2023-05-20

## 2023-05-10 RX ADMIN — ACETAMINOPHEN 320 MG: 160 SUSPENSION ORAL at 14:42

## 2023-05-10 RX ADMIN — Medication 100 MG: at 04:38

## 2023-05-10 RX ADMIN — POTASSIUM CHLORIDE 20 MEQ: 1500 TABLET, EXTENDED RELEASE ORAL at 09:12

## 2023-05-10 RX ADMIN — ACETAMINOPHEN 320 MG: 160 SUSPENSION ORAL at 19:40

## 2023-05-10 RX ADMIN — OMEPRAZOLE 20 MG: 20 CAPSULE, DELAYED RELEASE ORAL at 04:38

## 2023-05-10 RX ADMIN — ACETAMINOPHEN 320 MG: 160 SUSPENSION ORAL at 09:12

## 2023-05-10 RX ADMIN — CELECOXIB 200 MG: 200 CAPSULE ORAL at 12:55

## 2023-05-10 RX ADMIN — RIVAROXABAN 10 MG: 10 TABLET, FILM COATED ORAL at 18:47

## 2023-05-10 ASSESSMENT — ENCOUNTER SYMPTOMS
VOMITING: 0
FEVER: 0
CHILLS: 0
COUGH: 0
ABDOMINAL PAIN: 1
NAUSEA: 0
DIARRHEA: 1

## 2023-05-10 ASSESSMENT — PAIN DESCRIPTION - PAIN TYPE
TYPE: ACUTE PAIN;SURGICAL PAIN

## 2023-05-10 NOTE — CARE PLAN
The patient is Stable - Low risk of patient condition declining or worsening    Shift Goals  Clinical Goals: Q2 turns, rest, pain mangement, skin integrity  Patient Goals: Rest  Family Goals: No family present    Problem: Pain - Standard  Goal: Alleviation of pain or a reduction in pain to the patient’s comfort goal  Outcome: Progressing  Note: Educated patient on the use of 1-10 pain scale and use of pain descriptors. Administered pain medication when needed per MAR. Non-pharmacological methods for pain control in place such as rest, repositioning, and enforcing a calm and conductive environment.      Problem: Knowledge Deficit - Standard  Goal: Patient and family/care givers will demonstrate understanding of plan of care, disease process/condition, diagnostic tests and medications  Outcome: Progressing  Note: Patient educated on plan and goals of care and disease process. Education provided on medications, procedures, and equipment. Will continue to re-inforce education when required. All questions and concerns answered at this time.      Problem: Skin Integrity  Goal: Skin integrity is maintained or improved  Outcome: Progressing  Note: 4 eyes assessment complete    Preventative measures in place:  Q 2 hour turns, pillows in place for support and cushioning, 2 RN skin assessments, ensuring medical devices/tubing are not under patient, repositioning medical equipment q 2 hours, mepilex in place, low air loss mattress, mobility as tolerated, skin assessed under bony prominences and high pressure point areas, moisturizer.

## 2023-05-10 NOTE — PROGRESS NOTES
Received report of patient at start of shift. Patient is AOx4, scheduled Tylenol administered for complaints of pain. Assessment complete. Patient ambulates with SBA and FWW. Discharge orders received. Clarified with NENA Bagley that plan is for patient to discharge tomorrow. Patient updated on plan of care, encouraged to notify staff for any needs/assistance. Call light within reach.

## 2023-05-10 NOTE — DISCHARGE INSTRUCTIONS
Surgery Discharge Instructions    Kathy Dumont   Age: 67 y.o.   : 1955    During this admission you have been treated for:  Gastric outlet obstruction [K31.1]  Patient Active Problem List    Diagnosis Date Noted    Severe protein-calorie malnutrition (HCC) 2023    Gastric outlet obstruction 2023    Delayed gastric emptying 2023    Underweight due to inadequate caloric intake 2023    Eating disorder 2023    Hypothyroidism 2023    Gastroparesis 2021    Pancreatitis, chronic (HCC) 2021    Biliary colic 2021    PONV (postoperative nausea and vomiting) 2021    Closed nondisplaced fracture of lateral condyle of right femur (HCC) 2019    Anorexia 2019    Adult failure to thrive 10/30/2015    Osteoporosis 2012       Activity:   Resume light to normal activity as tolerated.  (ie - ok to walk, climb stairs, ect)  Do not engage in strenuous activity for 7-10 days.   Do not lift more than 10 pounds for the next 4-6 weeks.   It is important you are up walking several times a day to decrease the risk of a blood clot     Diet:  Resume regular diet as tolerated. To reduce risk of post-operative nausea and vomiting avoid spicy or greasy foods; eat small, frequent meals and maintain adequate fluid intake.    Medication(s):   Current Discharge Medication List        START taking these medications    Details   celecoxib (CELEBREX) 200 MG Cap Take 1 Capsule by mouth every day with lunch for 7 days.  Qty: 7 Capsule, Refills: 0    Associated Diagnoses: Gastric outlet obstruction; S/P exploratory laparotomy      metoclopramide (REGLAN) 5 MG tablet Take 1 Tablet by mouth four times daily - before meals and nightly as needed (Take as needed for appetite stimulation) for up to 10 days.  Qty: 40 Tablet, Refills: 0    Associated Diagnoses: Gastric outlet obstruction; S/P exploratory laparotomy      omeprazole (PRILOSEC) 20 MG delayed-release  capsule Take 1 Capsule by mouth every day for 30 days.  Qty: 30 Capsule, Refills: 0    Associated Diagnoses: Gastric outlet obstruction; S/P exploratory laparotomy      NORCO 5 MG Tab Take 1 Tablet by mouth every 6 hours as needed for Severe Pain (For moderate to severe pain) for up to 10 days.  Qty: 30 Tablet, Refills: 0    Associated Diagnoses: Gastric outlet obstruction; S/P exploratory laparotomy      thiamine (THIAMINE) 100 MG tablet Take 1 Tablet by mouth every day for 14 days.  Qty: 15 Tablet, Refills: 0    Associated Diagnoses: Gastric outlet obstruction; S/P exploratory laparotomy      docusate sodium (COLACE) 100 MG Cap Take 1 Capsule by mouth 2 times a day for 10 days. Hold for loose stools  Qty: 20 Capsule, Refills: 0    Associated Diagnoses: Gastric outlet obstruction; S/P exploratory laparotomy           CONTINUE these medications which have NOT CHANGED    Details   ondansetron (ZOFRAN) 4 MG Tab tablet ondansetron HCl 4 mg tablet   TAKE 1 TABLET BY MOUTH EVERY 8 HOURS AS NEEDED FOR NAUSEA      !! Multiple Vitamins-Minerals (IMMUNE SUPPORT) Chew Tab Chew every day.      levothyroxine (SYNTHROID) 50 MCG Tab Take 50 mcg by mouth every morning on an empty stomach.      HYDROcodone-acetaminophen (NORCO) 5-325 MG Tab per tablet Take 1 tablet by mouth every four hours as needed.      !! Multiple Vitamins-Minerals (MULTIVITAMIN ADULT) Chew Tab Chew every day.      Calcium Carb-Cholecalciferol (CALCIUM + VITAMIN D3) 500-400 MG-UNIT Chew Tab Chew every day.       !! - Potential duplicate medications found. Please discuss with provider.         See prescription(s); take as directed.   You do NOT have to take all of your prescription pain medication.  Take only as needed if pain is not controlled with over-the-counter medications (ex: Tylenol, ibuprofen).    If you are taking narcotic pain medications be sure to take a stool softener (available over the counter - ex: miralax, magnesium citrate,  dulcolax/bisacoidyl) to reduce risk of constipation.  Additionally, make sure that you are taking in enough fluids.  Do NOT drive or make any important decision while taking prescription pain medication.    Wound Care:    Your incision has been covered with staples. The staples will be removed next time you come to clinic.    Do NOT apply any creams, lotions, etc to the incision unless you have been specifically instructed to do so by your surgical team.    It is normal to have a small amount of clear/yellow/pink drainage from you incision as it heals.    OK to shower and wash gently with soap and water  Please call the surgical clinic if you have:              - increasing drainage from incision              - change in the color of drainage from you incision              - redness more than 1 fingerbreadth (1 centimeter) from the incision edge   - increasing pain   - fever more than 101F  Avoid exposing your incision to the sun    Bathing/Showering:    Please shower daily starting in 24 hours. You may wash over incisions with regular soap and water and pat dry.  Ok to allow water from the shower to run over you incision.  Avoid submerging you incision under water (no baths, swimming or hot tubs) until fully healed.      Reducing post discharge nausea or vomiting:    Limit mobility, take slow, deep breaths.    Restrictions:   No smoking  No alcohol while taking prescription pain medications  No strenuous activity until cleared by surgery clinic  No heavy lifting (more than 10 lbs) for at least the next 4-6 weeks  No driving while taking prescription pain medication  No driving until you have the mobility to be a safe     Additional Instructions:   If you experience chest pain or shortness of breath, or have an acute emergency - please call 911    Please call clinic or seek evaluation at the ER if you have any of the following:  - Fever >101 F      - Wound infection (increasing redness, swelling, drainage,  pus)     - Severe pain not controlled with oral medications     - Severe nausea or vomiting, inability to tolerate PO intake     - Bleeding that does not stop with holding pressure to the area       - Yellowing of the skin or eyes     - Change in mental status (confusion or lethargy)      - New numbness or weakness      - Any other concerns.    Follow-up:    Follow-up with our office for staple removal (as needed) after 10-14 days from date of surgery  Please call clinic to confirm appointment [phone]  Please keep all follow-up appointments

## 2023-05-10 NOTE — PROGRESS NOTES
Bedside report received.  Assessment complete.     A&O x 4. Patient calls appropriately.     Patient ambulates with standby assist with FFW.      Patient has 6/10 pain. Pain managed with prescribed scheduled medications.     Denies N&V. Tolerating regular diet, poor intake.     MLI with a prevena wound vac.     + void, + flatus, +BM, last BM 5/9.     Patient denies SOB.     SCD's on.     Review plan with of care with patient. Call light and personal belongings within reach. Hourly rounding in place. All needs met at this time

## 2023-05-10 NOTE — CARE PLAN
The patient is Stable - Low risk of patient condition declining or worsening    Problem: Pain - Standard  Goal: Alleviation of pain or a reduction in pain to the patient’s comfort goal  Outcome: Progressing     Problem: Knowledge Deficit - Standard  Goal: Patient and family/care givers will demonstrate understanding of plan of care, disease process/condition, diagnostic tests and medications  Outcome: Progressing     Problem: Skin Integrity  Goal: Skin integrity is maintained or improved  Outcome: Progressing     Shift Goals  Clinical Goals: Q2 turns, rest, pain management  Patient Goals: rest  Family Goals: No family present    Progress made toward(s) clinical / shift goals:  Patient's pain managed per MAR. Patient able to rest during this shift.    Patient is not progressing towards the following goals:

## 2023-05-10 NOTE — DISCHARGE SUMMARY
Discharge Summary    CHIEF COMPLAINT ON ADMISSION  1.  Gastric outlet obstruction.  2.  Malnutrition.  3.  Nausea and vomiting.  4.  Unintended weight loss.    Reason for Admission  Gastric outlet obstruction     Admission Date  5/5/2023    CODE STATUS  Full Code    HPI & HOSPITAL COURSE  The patient is a 67-year-old female patient of Dr. Cadet who was referred to me after having been diagnosed with gastric outlet obstruction. The patient had a long chronic history of anorexia nervosa.  In any event, the patient has continued to lose weight and her BMI presently is   10.93 at 28 kilograms.  She is extremely malnourished and she says she can only have liquids.  She has had multiple dilations as well as Botox injections, but continues to have struggles with keeping any food down.  Her most recent albumin was 3.9, but the patient appears dehydrated as well.  In any event, after a long discussion, the patient elected to proceed with a Sinai-en-Y gastrojejunostomy to bypass her pylorus.    PROCEDURE:  Sinai-en-Y gastrojejunostomy.    Patient diet was advanced in standard gradual fashion, and patient eventually tolerated well with minimal complaints of N/V. Patient was OOB and ambulated with support of nursing staff and PT team. Patient was transitioned to PO pain medications.    Surgical dressing over the abdominal midline surgical incision was removed before discharge.    Wound care instructions provided to patient.     Therefore, she is discharged in good and stable condition to home with close outpatient follow-up.    The patient met 2-midnight criteria for an inpatient stay at the time of discharge.    Discharge Date  5/11/23    DISCHARGE DIAGNOSES  Principal Problem:    Gastric outlet obstruction (POA: Yes)  Active Problems:    Gastroparesis (POA: Yes)    Pancreatitis, chronic (HCC) (POA: Yes)    Eating disorder (POA: Yes)      Overview: Formatting of this note might be different from the original.      Anorexia  nervosa    Delayed gastric emptying (POA: Yes)    Underweight due to inadequate caloric intake (POA: Yes)    Severe protein-calorie malnutrition (HCC) (POA: Unknown)  Resolved Problems:    Partial gastric outlet obstruction (POA: Yes)      FOLLOW UP  No future appointments.  BOONE [facility]  [address]  [facility]  [place] [id number]  [phone]    Schedule an appointment as soon as possible for a visit in 1 week(s)  For wound re-check      MEDICATIONS ON DISCHARGE     Medication List        START taking these medications        Instructions   celecoxib 200 MG Caps  Start taking on: May 11, 2023  Commonly known as: CELEBREX   Take 1 Capsule by mouth every day with lunch for 7 days.  Dose: 200 mg     docusate sodium 100 MG Caps  Commonly known as: Colace   Take 1 Capsule by mouth 2 times a day for 10 days. Hold for loose stools  Dose: 100 mg     metoclopramide 5 MG tablet  Commonly known as: REGLAN   Take 1 Tablet by mouth four times daily - before meals and nightly as needed (Take as needed for appetite stimulation) for up to 10 days.  Dose: 5 mg     omeprazole 20 MG delayed-release capsule  Start taking on: May 11, 2023  Commonly known as: PRILOSEC   Take 1 Capsule by mouth every day for 30 days.  Dose: 20 mg     NORCO 5 MG Tabs  Commonly known as:    Take 1 Tablet by mouth every 6 hours as needed for Severe Pain (For moderate to severe pain) for up to 7-10 days.  Dose: 5 mg     thiamine 100 MG tablet  Start taking on: May 11, 2023  Commonly known as: THIAMINE   Take 1 Tablet by mouth every day for 14 days.  Dose: 100 mg            ASK your doctor about these medications        Instructions   Calcium + Vitamin D3 500-10 MG-MCG Chew  Generic drug: Calcium Carb-Cholecalciferol   Chew every day.     HYDROcodone-acetaminophen 5-325 MG Tabs per tablet  Commonly known as: NORCO   Take 1 tablet by mouth every four hours as needed.  Dose: 1 Tablet     levothyroxine 50 MCG Tabs  Commonly known as: SYNTHROID   Take  "50 mcg by mouth every morning on an empty stomach.  Dose: 50 mcg     * Multivitamin Adult Chew   Chew every day.     * Immune Support Chew   Chew every day.     ondansetron 4 MG Tabs tablet  Commonly known as: ZOFRAN   ondansetron HCl 4 mg tablet   TAKE 1 TABLET BY MOUTH EVERY 8 HOURS AS NEEDED FOR NAUSEA           * This list has 2 medication(s) that are the same as other medications prescribed for you. Read the directions carefully, and ask your doctor or other care provider to review them with you.                  Allergies  Allergies   Allergen Reactions    Hydrocodone-Acetaminophen Rash     Rash, \"I can tolerate in small amounts\"      Loratadine-Pseudoephedrine Rash     rash    Sertraline Hcl Palpitations    Tramadol Vomiting     Fainting    Zoloft Rash     rash    Codeine Vomiting    Gluten Meal      \"violent stomach upset for 4-5 days\"       DIET  Orders Placed This Encounter   Procedures    Diet Order Diet: Regular; Miscellaneous modifications: (optional): Gluten Free     Standing Status:   Standing     Number of Occurrences:   1     Order Specific Question:   Diet:     Answer:   Regular [1]     Order Specific Question:   Miscellaneous modifications: (optional)     Answer:   Gluten Free [9]       ACTIVITY  As tolerated.  Weight bearing as tolerated    LABORATORY  Lab Results   Component Value Date    SODIUM 142 05/10/2023    POTASSIUM 3.4 (L) 05/10/2023    CHLORIDE 108 05/10/2023    CO2 25 05/10/2023    GLUCOSE 105 (H) 05/10/2023    BUN 10 05/10/2023    CREATININE 0.53 05/10/2023        Lab Results   Component Value Date    WBC 3.6 (L) 05/10/2023    HEMOGLOBIN 9.7 (L) 05/10/2023    HEMATOCRIT 28.7 (L) 05/10/2023    PLATELETCT 199 05/10/2023        Total time of the discharge process exceeds 30 minutes.  "

## 2023-05-10 NOTE — PROGRESS NOTES
Surgery General Daily Progress Note     Date of Service  May 10, 2023     Chief Complaint  1.  Gastric outlet obstruction.  2.  Malnutrition.  3.  Nausea and vomiting.  4.  Unintended weight loss.      Surgery Completed  PROCEDURE:  Sinai-en-Y gastrojejunostomy.    Hospital Course  POD # 5     Interval Problem Update  No acute events  Patient appears more cheerful   Patient reports improved appetite, improved PO intake  Diarrhea / loose stool    Problem List  Principal Problem:    Gastric outlet obstruction (POA: Yes)  Active Problems:    Gastroparesis (POA: Yes)    Pancreatitis, chronic (HCC) (POA: Yes)    Eating disorder (POA: Yes)      Overview: Formatting of this note might be different from the original.      Anorexia nervosa    Delayed gastric emptying (POA: Yes)    Underweight due to inadequate caloric intake (POA: Yes)    Partial gastric outlet obstruction (POA: Yes)    Severe protein-calorie malnutrition (HCC) (POA: Unknown)  Resolved Problems:    * No resolved hospital problems. *     Subjective  Review of Systems   Constitutional:  Positive for malaise/fatigue. Negative for chills and fever.   Respiratory:  Negative for cough.    Cardiovascular:  Negative for chest pain.   Gastrointestinal:  Positive for abdominal pain and diarrhea. Negative for nausea and vomiting.   All other systems reviewed and are negative.      Objective  Temp:  [36.6 °C (97.9 °F)-37.2 °C (99 °F)] 36.6 °C (97.9 °F)  Pulse:  [62-70] 62  Resp:  [17] 17  BP: ()/(49-59) 103/59  SpO2:  [94 %-95 %] 94 %      Physical Exam  Vitals and nursing note reviewed.   Constitutional:       General: She is not in acute distress.     Appearance: She is underweight. She is ill-appearing.   HENT:      Head: Normocephalic and atraumatic.      Nose: Nose normal.      Mouth/Throat:      Pharynx: Oropharynx is clear.   Eyes:      Conjunctiva/sclera: Conjunctivae normal.   Cardiovascular:      Rate and Rhythm: Normal rate.   Pulmonary:      Effort:  Pulmonary effort is normal. No respiratory distress.   Abdominal:      General: There is no distension.      Palpations: Abdomen is soft.      Comments: Abdominal incision, non-tender, Prevena dressing intact     Skin:     General: Skin is warm and dry.   Neurological:      Mental Status: She is alert and oriented to person, place, and time.   Psychiatric:         Mood and Affect: Mood normal.         Behavior: Behavior normal.        Fluids  Intake/Output Summary (Last 24 hours) at 5/10/2023 1117  Last data filed at 5/10/2023 0024  Gross per 24 hour   Intake 1823.7 ml   Output --   Net 1823.7 ml         Labs  Lab Results   Component Value Date/Time    SODIUM 142 05/10/2023 01:07 AM    POTASSIUM 3.4 (L) 05/10/2023 01:07 AM    CHLORIDE 108 05/10/2023 01:07 AM    CO2 25 05/10/2023 01:07 AM    GLUCOSE 105 (H) 05/10/2023 01:07 AM    BUN 10 05/10/2023 01:07 AM    CREATININE 0.53 05/10/2023 01:07 AM         No results found for: PROTHROMBTM, INR      Lab Results   Component Value Date/Time    WBC 3.6 (L) 05/10/2023 01:07 AM    RBC 2.97 (L) 05/10/2023 01:07 AM    HEMOGLOBIN 9.7 (L) 05/10/2023 01:07 AM    HEMATOCRIT 28.7 (L) 05/10/2023 01:07 AM    MCV 96.6 05/10/2023 01:07 AM    MCH 32.7 05/10/2023 01:07 AM    MCHC 33.8 05/10/2023 01:07 AM    MPV 9.9 05/10/2023 01:07 AM    NEUTSPOLYS 83.80 (H) 05/07/2023 10:06 AM    LYMPHOCYTES 13.10 (L) 05/07/2023 10:06 AM    MONOCYTES 2.20 05/07/2023 10:06 AM    EOSINOPHILS 0.20 05/07/2023 10:06 AM    BASOPHILS 0.20 05/07/2023 10:06 AM         Recent Labs     05/06/23  0148 05/07/23  0926 05/08/23  0617 05/09/23  0149 05/10/23  0107   ASTSGOT 32 27 25 27 24   ALTSGPT 33 21 22 19 19   TBILIRUBIN 0.6 0.5 0.4 0.3 0.3   GLOBULIN 1.7* 1.8* 1.7* 1.7* 1.8*      Assessment/Plan  Pain controlled on PO/PRN  Appreciate continued help by RD for finding appealing food choices  Treat hypokalemia  DC IV fluids  Continue OOB and ambulate    Discussed plans for DC home, possible tomorrow 5/11/23     Kevyn  Odin MS, MPH, NP  Surgical Oncology  May 10, 2023, 11:17 AM

## 2023-05-11 VITALS
DIASTOLIC BLOOD PRESSURE: 59 MMHG | WEIGHT: 61.73 LBS | SYSTOLIC BLOOD PRESSURE: 95 MMHG | RESPIRATION RATE: 18 BRPM | BODY MASS INDEX: 10.94 KG/M2 | OXYGEN SATURATION: 95 % | HEART RATE: 57 BPM | HEIGHT: 63 IN | TEMPERATURE: 98.6 F

## 2023-05-11 PROCEDURE — A9270 NON-COVERED ITEM OR SERVICE: HCPCS | Performed by: NURSE PRACTITIONER

## 2023-05-11 PROCEDURE — 700102 HCHG RX REV CODE 250 W/ 637 OVERRIDE(OP): Performed by: NURSE PRACTITIONER

## 2023-05-11 PROCEDURE — RXMED WILLOW AMBULATORY MEDICATION CHARGE: Performed by: NURSE PRACTITIONER

## 2023-05-11 RX ADMIN — Medication 100 MG: at 04:58

## 2023-05-11 RX ADMIN — POTASSIUM CHLORIDE 20 MEQ: 1500 TABLET, EXTENDED RELEASE ORAL at 04:58

## 2023-05-11 RX ADMIN — OMEPRAZOLE 20 MG: 20 CAPSULE, DELAYED RELEASE ORAL at 04:58

## 2023-05-11 ASSESSMENT — PAIN DESCRIPTION - PAIN TYPE
TYPE: ACUTE PAIN;SURGICAL PAIN
TYPE: ACUTE PAIN;SURGICAL PAIN

## 2023-05-11 NOTE — CARE PLAN
The patient is Stable - Low risk of patient condition declining or worsening    Shift Goals  Clinical Goals: Discharge home today  Patient Goals: Discharge home today    Progress made toward(s) clinical / shift goals:  Discharge orders received. Planning for discharge home today. Significant other at bedside, states he will provide transportation home.    Patient is not progressing towards the following goals: N/A

## 2023-05-11 NOTE — PROGRESS NOTES
Received report of patient at start of shift. Patient is AOx4, significant other at bedside. Patient declines intervention for pain. Assessment complete. Patient ambulates with SBA and FWW. Discharge orders received. Patient updated on plan of care/discharge plan, encouraged to notify staff for any needs/assistance. Call light within reach.

## 2023-05-11 NOTE — CARE PLAN
The patient is Stable - Low risk of patient condition declining or worsening    Problem: Pain - Standard  Goal: Alleviation of pain or a reduction in pain to the patient’s comfort goal  Outcome: Progressing     Problem: Knowledge Deficit - Standard  Goal: Patient and family/care givers will demonstrate understanding of plan of care, disease process/condition, diagnostic tests and medications  Outcome: Progressing     Problem: Skin Integrity  Goal: Skin integrity is maintained or improved  Outcome: Progressing     Shift Goals  Clinical Goals: pain control, rest  Patient Goals: rest  Family Goals: No family present    Progress made toward(s) clinical / shift goals:  Patient's pain managed per MAR. Patient able to rest during this shift.    Patient is not progressing towards the following goals:

## 2023-05-11 NOTE — PROGRESS NOTES
Bedside report received.  Assessment complete.     A&O x 4. Patient calls appropriately.     Patient ambulates with standby assist with FFW.      Patient has 3/10 pain. Pain managed with prescribed scheduled medications.     Denies N&V. Tolerating regular diet, poor intake.     MLI with a prevena wound vac.     + void, + flatus, +BM, last BM 5/10.     Patient denies SOB.     SCD's on.     Review plan with of care with patient. Call light and personal belongings within reach. Hourly rounding in place. All needs met at this time

## 2023-05-11 NOTE — CARE PLAN
The patient is Stable - Low risk of patient condition declining or worsening    Shift Goals  Clinical Goals: Pain management, increased mobility    Progress made toward(s) clinical / shift goals: Patient reports pain is adequately controlled with scheduled Tylenol. Patient ambulated in hallway this shift.    Patient is not progressing towards the following goals: N/A

## 2023-05-12 ENCOUNTER — TELEPHONE (OUTPATIENT)
Dept: SURGICAL ONCOLOGY | Facility: MEDICAL CENTER | Age: 68
End: 2023-05-12
Payer: MEDICARE

## 2023-05-12 NOTE — PROGRESS NOTES
Discharging patient home per physician order.  Discharged with significant other.  Patient demonstrated understanding of discharge instructions, follow up appointments, home medications, prescriptions, and home care for surgical wound.  Ambulating with standby assistance, voiding without difficulty, pain well controlled, tolerating oral medications, oxygen saturation greater than 90%, tolerating diet.  Patient verbalized understanding of discharge instructions and educational handouts.  All questions answered.  Belongings with patient at time of discharge.  Patient declined med-to-beds delivery of Norco.

## 2023-05-12 NOTE — TELEPHONE ENCOUNTER
Pt informed of keeping legs elevated and that she will eventually urinate and her swelling will subside. I let Pt know if anything out of the ordinary occurs and she is not feeling well to go to the ER. I also let her know that if anything does change over the weekend she can always give us a call Monday morning. Pt understood and did not have anymore questions.

## 2023-05-12 NOTE — TELEPHONE ENCOUNTER
Pt called in stating that she just had surgery yesterday and noticed this morning her legs are swollen up to her stomach. I let her know she can elevate them and that may help- Pt said she has been doing that and is reaching out to know if there is anything else she can do? I let her know I will reach out to her Dr as our APRN and PA are out for today. Pt understood and I let her know either myself or  would reach out to her for advice.

## 2023-05-14 ENCOUNTER — APPOINTMENT (OUTPATIENT)
Dept: RADIOLOGY | Facility: MEDICAL CENTER | Age: 68
End: 2023-05-14
Attending: EMERGENCY MEDICINE
Payer: MEDICARE

## 2023-05-14 ENCOUNTER — PHARMACY VISIT (OUTPATIENT)
Dept: PHARMACY | Facility: MEDICAL CENTER | Age: 68
End: 2023-05-14
Payer: COMMERCIAL

## 2023-05-14 ENCOUNTER — HOSPITAL ENCOUNTER (EMERGENCY)
Facility: MEDICAL CENTER | Age: 68
End: 2023-05-14
Attending: EMERGENCY MEDICINE
Payer: MEDICARE

## 2023-05-14 VITALS
HEIGHT: 63 IN | TEMPERATURE: 98 F | DIASTOLIC BLOOD PRESSURE: 53 MMHG | OXYGEN SATURATION: 95 % | BODY MASS INDEX: 10.98 KG/M2 | SYSTOLIC BLOOD PRESSURE: 92 MMHG | HEART RATE: 64 BPM | WEIGHT: 62 LBS | RESPIRATION RATE: 20 BRPM

## 2023-05-14 DIAGNOSIS — E46 PROTEIN-CALORIE MALNUTRITION, UNSPECIFIED SEVERITY (HCC): ICD-10-CM

## 2023-05-14 DIAGNOSIS — R60.9 PERIPHERAL EDEMA: ICD-10-CM

## 2023-05-14 LAB
ALBUMIN SERPL BCP-MCNC: 3.5 G/DL (ref 3.2–4.9)
ALBUMIN/GLOB SERPL: 1.6 G/DL
ALP SERPL-CCNC: 93 U/L (ref 30–99)
ALT SERPL-CCNC: 21 U/L (ref 2–50)
ANION GAP SERPL CALC-SCNC: 11 MMOL/L (ref 7–16)
AST SERPL-CCNC: 29 U/L (ref 12–45)
BASOPHILS # BLD AUTO: 0.3 % (ref 0–1.8)
BASOPHILS # BLD: 0.02 K/UL (ref 0–0.12)
BILIRUB SERPL-MCNC: 0.4 MG/DL (ref 0.1–1.5)
BUN SERPL-MCNC: 8 MG/DL (ref 8–22)
CALCIUM ALBUM COR SERPL-MCNC: 9.1 MG/DL (ref 8.5–10.5)
CALCIUM SERPL-MCNC: 8.7 MG/DL (ref 8.5–10.5)
CHLORIDE SERPL-SCNC: 105 MMOL/L (ref 96–112)
CO2 SERPL-SCNC: 24 MMOL/L (ref 20–33)
CREAT SERPL-MCNC: 0.49 MG/DL (ref 0.5–1.4)
EKG IMPRESSION: NORMAL
EOSINOPHIL # BLD AUTO: 0.04 K/UL (ref 0–0.51)
EOSINOPHIL NFR BLD: 0.7 % (ref 0–6.9)
ERYTHROCYTE [DISTWIDTH] IN BLOOD BY AUTOMATED COUNT: 44.1 FL (ref 35.9–50)
GFR SERPLBLD CREATININE-BSD FMLA CKD-EPI: 103 ML/MIN/1.73 M 2
GLOBULIN SER CALC-MCNC: 2.2 G/DL (ref 1.9–3.5)
GLUCOSE SERPL-MCNC: 103 MG/DL (ref 65–99)
HCT VFR BLD AUTO: 31.4 % (ref 37–47)
HGB BLD-MCNC: 10.8 G/DL (ref 12–16)
IMM GRANULOCYTES # BLD AUTO: 0.03 K/UL (ref 0–0.11)
IMM GRANULOCYTES NFR BLD AUTO: 0.5 % (ref 0–0.9)
LYMPHOCYTES # BLD AUTO: 0.87 K/UL (ref 1–4.8)
LYMPHOCYTES NFR BLD: 15.1 % (ref 22–41)
MCH RBC QN AUTO: 33 PG (ref 27–33)
MCHC RBC AUTO-ENTMCNC: 34.4 G/DL (ref 33.6–35)
MCV RBC AUTO: 96 FL (ref 81.4–97.8)
MONOCYTES # BLD AUTO: 0.22 K/UL (ref 0–0.85)
MONOCYTES NFR BLD AUTO: 3.8 % (ref 0–13.4)
NEUTROPHILS # BLD AUTO: 4.6 K/UL (ref 2–7.15)
NEUTROPHILS NFR BLD: 79.6 % (ref 44–72)
NRBC # BLD AUTO: 0 K/UL
NRBC BLD-RTO: 0 /100 WBC
NT-PROBNP SERPL IA-MCNC: 482 PG/ML (ref 0–125)
PLATELET # BLD AUTO: 343 K/UL (ref 164–446)
PMV BLD AUTO: 9.3 FL (ref 9–12.9)
POTASSIUM SERPL-SCNC: 3.8 MMOL/L (ref 3.6–5.5)
PROT SERPL-MCNC: 5.7 G/DL (ref 6–8.2)
RBC # BLD AUTO: 3.27 M/UL (ref 4.2–5.4)
SODIUM SERPL-SCNC: 140 MMOL/L (ref 135–145)
TROPONIN T SERPL-MCNC: 18 NG/L (ref 6–19)
TROPONIN T SERPL-MCNC: 21 NG/L (ref 6–19)
WBC # BLD AUTO: 5.8 K/UL (ref 4.8–10.8)

## 2023-05-14 PROCEDURE — 85025 COMPLETE CBC W/AUTO DIFF WBC: CPT

## 2023-05-14 PROCEDURE — 80053 COMPREHEN METABOLIC PANEL: CPT

## 2023-05-14 PROCEDURE — 84484 ASSAY OF TROPONIN QUANT: CPT | Mod: 91

## 2023-05-14 PROCEDURE — 71045 X-RAY EXAM CHEST 1 VIEW: CPT

## 2023-05-14 PROCEDURE — 93005 ELECTROCARDIOGRAM TRACING: CPT

## 2023-05-14 PROCEDURE — 93005 ELECTROCARDIOGRAM TRACING: CPT | Performed by: EMERGENCY MEDICINE

## 2023-05-14 PROCEDURE — 99285 EMERGENCY DEPT VISIT HI MDM: CPT

## 2023-05-14 PROCEDURE — RXMED WILLOW AMBULATORY MEDICATION CHARGE: Performed by: EMERGENCY MEDICINE

## 2023-05-14 PROCEDURE — 83880 ASSAY OF NATRIURETIC PEPTIDE: CPT

## 2023-05-14 PROCEDURE — 36415 COLL VENOUS BLD VENIPUNCTURE: CPT

## 2023-05-14 RX ORDER — POTASSIUM CHLORIDE 20 MEQ/1
20 TABLET, EXTENDED RELEASE ORAL DAILY
Qty: 14 TABLET | Refills: 0 | Status: SHIPPED | OUTPATIENT
Start: 2023-05-14 | End: 2023-05-28

## 2023-05-14 RX ORDER — FUROSEMIDE 20 MG/1
20 TABLET ORAL DAILY
Qty: 14 TABLET | Refills: 0 | Status: SHIPPED | OUTPATIENT
Start: 2023-05-14 | End: 2023-05-28

## 2023-05-14 ASSESSMENT — FIBROSIS 4 INDEX: FIB4 SCORE: 1.85

## 2023-05-14 NOTE — ED NOTES
Report received from prior RN. Pt alert. Resp normal/unlabored. Bed side rails up/in low position. Pt updated to POC and all questions answered.     Family at bedside, US IV placed per YOUSUF Novoa

## 2023-05-14 NOTE — ED TRIAGE NOTES
"Chief Complaint   Patient presents with    Chest Pain     PATIENT STATES CHEST TIGHTNESS AND SWELLING SINCE SURGERY LAST WEEK. PATIENT STATES THE BLOATING AND SWELLING IS CAUSING THE CHEST TIGHTNESS. PT DENIES SOB. PT STATES A DECREASE IN URINE OUTPUT.     DR LY /SURGERY FOR GASTROPARESIS.     Dizziness           PT WHEELED TO TRIAGE. Pt AA&O X 4, SEE ABOVE.     PT TO LAB . PT EDUCATED ON ALERTING STAFF TO CHANGES IN CONDITION. PT VERBALIZED AN UNDERSTANDING.     /59   Pulse 81   Temp 36.8 °C (98.2 °F) (Temporal)   Resp 14   Ht 1.6 m (5' 3\")   Wt 28.1 kg (62 lb)   SpO2 96%   BMI 10.98 kg/m²     "

## 2023-05-14 NOTE — ED NOTES
Bedside report to RN Oksana and apprentice Wagner. Pt on room air, gurney in lowest locked position. Call light within reach.

## 2023-05-14 NOTE — ED PROVIDER NOTES
CHIEF COMPLAINT  Chief Complaint   Patient presents with    Chest Pain     PATIENT STATES CHEST TIGHTNESS AND SWELLING SINCE SURGERY LAST WEEK. PATIENT STATES THE BLOATING AND SWELLING IS CAUSING THE CHEST TIGHTNESS. PT DENIES SOB. PT STATES A DECREASE IN URINE OUTPUT.     DR LY /SURGERY FOR GASTROPARESIS.     Dizziness       LIMITATION TO HISTORY   Select: None    HPI    Kathy Dumont is a 67 y.o. female who presents to the Emergency Department complaining of increasing swelling to both lower extremities as well as increasing shortness of breath and a sensation of chest tightness.  Patient is status post gastric outlet obstruction with Sinai-en-Y gastrojejunostomy bypass and an exploratory laparotomy done by Dr. Witt on 5 5 discharged from the hospital on the 11th.  The patient states that since she has been home she has been having increasing swelling to both lower extremities describing increasing discomfort as well as a increasing sensation of shortness of breath and chest tightness.  Patient states that when she takes a deep breath she feels tight.  She denies any overt fevers and chills she is able to tolerate p.o. fluids.  Because of the symptoms she presents emerged department for evaluation.    OUTSIDE HISTORIAN(S):  Select:  is at bedside    EXTERNAL RECORDS REVIEWED  Select: Other admission notes from 5/5 to 5/11/2023 history of malnourishment with gastric outlet obstruction      PAST MEDICAL HISTORY  Past Medical History:   Diagnosis Date    Acute recurrent pancreatitis 08/2021    Anesthesia     PONV    Arthritis 08/2021    fingers    Celiac disease     Disorder of thyroid     hypothyroid    Gastroparesis 08/2021    Gluten intolerance     High cholesterol     PONV (postoperative nausea and vomiting)      .    SURGICAL HISTORY  Past Surgical History:   Procedure Laterality Date    GASTROJEJUNOSTOMY N/A 5/5/2023    Procedure: SINAI-EN-Y GASTROJEJUNOSTOMY BYPASS AND  EXPLORATORY LAPAORTOMY;  Surgeon: Bradly Cisneros M.D.;  Location: [facility];  Service: General    N[place] UPPER GI ENDOSCOPY,DIAGNOSIS  4/18/2022    Procedure: GASTROSCOPY - WITH PYLORUS DILATION WITH BOTOX INJECTION;  Surgeon: Rivas Cadet M.D.;  Location: [facility];  Service: Gastroenterology    N[place] ERCP,DIAGNOSTIC  4/18/2022    Procedure: ERCP (ENDOSCOPIC RETROGRADE CHOLANGIOPANCREATOGRAPHY);  Surgeon: Rivas Cadet M.D.;  Location: [facility];  Service: Gastroenterology    [place] UPPER GI ENDOSCOPY,DIAGNOSIS N/A 8/9/2021    Procedure: GASTROSCOPY;  Surgeon: Rivas Cadet M.D.;  Location: [facility];  Service: EUS    [place] ERCP,DIAGNOSTIC N/A 8/9/2021    Procedure: ERCP, DIAGNOSTIC;  Surgeon: Rivas Cadet M.D.;  Location: [facility];  Service: EUS    EGD W/ENDOSCOPIC ULTRASOUND N/A 8/9/2021    Procedure: EGD, WITH ENDOSCOPIC US - UPPER RADIAL;  Surgeon: Rivas Cadet M.D.;  Location: [facility];  Service: EUS    ORIF, HUMERUS Left 2006    KNEE ARTHROSCOPY Left 1973    CHOLECYSTECTOMY  1963    APPENDECTOMY CHILD      PRIMARY C SECTION  1984, 1987, 1990         FAMILY HISTORY  History reviewed. No pertinent family history.       SOCIAL HISTORY  Social History     Socioeconomic History    Marital status:      Spouse name: Not on file    Number of children: Not on file    Years of education: Not on file    Highest education level: Not on file   Occupational History    Not on file   Tobacco Use    Smoking status: Never    Smokeless tobacco: Never   Vaping Use    Vaping Use: Never used   Substance and Sexual Activity    Alcohol use: Never    Drug use: Yes     Types: Oral     Comment: cbd/thc, daily for pain - last took 5/3/2023    Sexual activity: Not on file   Other Topics Concern    Not on file   Social History Narrative    Not on file     Social Determinants of Health     Financial Resource Strain: Not on  "file   Food Insecurity: Not on file   Transportation Needs: Not on file   Physical Activity: Not on file   Stress: Not on file   Social Connections: Not on file   Intimate Partner Violence: Not on file   Housing Stability: Not on file         CURRENT MEDICATIONS  No current facility-administered medications on file prior to encounter.     Current Outpatient Medications on File Prior to Encounter   Medication Sig Dispense Refill    celecoxib (CELEBREX) 200 MG Cap Take 1 Capsule by mouth every day with lunch for 7 days. 7 Capsule 0    metoclopramide (REGLAN) 5 MG tablet Take 1 Tablet by mouth four times daily - before meals and nightly as needed (Take as needed for appetite stimulation) for up to 10 days. 40 Tablet 0    omeprazole (PRILOSEC) 20 MG delayed-release capsule Take 1 Capsule by mouth every day for 30 days. 30 Capsule 0    thiamine (THIAMINE) 100 MG tablet Take 1 Tablet by mouth every day for 14 days. 15 Tablet 0    docusate sodium (COLACE) 100 MG Cap Take 1 Capsule by mouth 2 times a day for 10 days. Hold for loose stools 20 Capsule 0    HYDROcodone-acetaminophen (NORCO) 5-325 MG Tab per tablet Take 1 Tablet by mouth every four hours as needed (moderate to severe pain) for up to 7 days. 20 Tablet 0    ondansetron (ZOFRAN) 4 MG Tab tablet ondansetron HCl 4 mg tablet   TAKE 1 TABLET BY MOUTH EVERY 8 HOURS AS NEEDED FOR NAUSEA      Multiple Vitamins-Minerals (IMMUNE SUPPORT) Chew Tab Chew every day.      levothyroxine (SYNTHROID) 50 MCG Tab Take 50 mcg by mouth every morning on an empty stomach.      Multiple Vitamins-Minerals (MULTIVITAMIN ADULT) Chew Tab Chew every day.      Calcium Carb-Cholecalciferol (CALCIUM + VITAMIN D3) 500-400 MG-UNIT Chew Tab Chew every day.             ALLERGIES  Allergies   Allergen Reactions    Hydrocodone-Acetaminophen Rash     Rash, \"I can tolerate in small amounts\"      Loratadine-Pseudoephedrine Rash     rash    Sertraline Hcl Palpitations    Tramadol Vomiting     Fainting    " "Zoloft Rash     rash    Codeine Vomiting    Gluten Meal      \"violent stomach upset for 4-5 days\"       PHYSICAL EXAM  VITAL SIGNS:BP 92/53   Pulse 64   Temp 36.7 °C (98 °F) (Temporal)   Resp 20   Ht 1.6 m (5' 3\")   Wt 28.1 kg (62 lb)   SpO2 95%   BMI 10.98 kg/m²     Constitutional: Cachectic in appearance no acute distress  HENT: Normocephalic, Atraumatic, Bilateral external ears normal.  Eyes:  conjunctiva are normal.   Neck: Supple.  Nontender midline  Cardiovascular: Regular rate and rhythm without murmurs gallops or rubs.   Thorax & Lungs: No respiratory distress. Breathing comfortably. Lungs are diminished bilaterally no rhonchi wheezes or rales bilaterally, there are no wheezes no rales. Chest wall is nontender.  Abdomen: Soft, nontender nondistended.  Surgical incision is well-healing  Skin: Warm, Dry, No erythema,   Back: No tenderness, No CVA tenderness.  Musculoskeletal: No clubbing cyanosis patient does have 2+ pitting edema both lower extremities no erythema.  Neurologic: Alert & oriented x 3, normal sensation moving all extremities appears normal   Psychiatric: Affect normal, Judgment normal, Mood normal.       DIAGNOSTIC STUDIES / PROCEDURES  EKG  I have independently interpreted this EKG  Interpreted below by myself as     LABS  Results for orders placed or performed during the hospital encounter of 05/14/23   CBC with Differential   Result Value Ref Range    WBC 5.8 4.8 - 10.8 K/uL    RBC 3.27 (L) 4.20 - 5.40 M/uL    Hemoglobin 10.8 (L) 12.0 - 16.0 g/dL    Hematocrit 31.4 (L) 37.0 - 47.0 %    MCV 96.0 81.4 - 97.8 fL    MCH 33.0 27.0 - 33.0 pg    MCHC 34.4 33.6 - 35.0 g/dL    RDW 44.1 35.9 - 50.0 fL    Platelet Count 343 164 - 446 K/uL    MPV 9.3 9.0 - 12.9 fL    Neutrophils-Polys 79.60 (H) 44.00 - 72.00 %    Lymphocytes 15.10 (L) 22.00 - 41.00 %    Monocytes 3.80 0.00 - 13.40 %    Eosinophils 0.70 0.00 - 6.90 %    Basophils 0.30 0.00 - 1.80 %    Immature Granulocytes 0.50 0.00 - 0.90 %    " Nucleated RBC 0.00 /100 WBC    Neutrophils (Absolute) 4.60 2.00 - 7.15 K/uL    Lymphs (Absolute) 0.87 (L) 1.00 - 4.80 K/uL    Monos (Absolute) 0.22 0.00 - 0.85 K/uL    Eos (Absolute) 0.04 0.00 - 0.51 K/uL    Baso (Absolute) 0.02 0.00 - 0.12 K/uL    Immature Granulocytes (abs) 0.03 0.00 - 0.11 K/uL    NRBC (Absolute) 0.00 K/uL   Complete Metabolic Panel (CMP)   Result Value Ref Range    Sodium 140 135 - 145 mmol/L    Potassium 3.8 3.6 - 5.5 mmol/L    Chloride 105 96 - 112 mmol/L    Co2 24 20 - 33 mmol/L    Anion Gap 11.0 7.0 - 16.0    Glucose 103 (H) 65 - 99 mg/dL    Bun 8 8 - 22 mg/dL    Creatinine 0.49 (L) 0.50 - 1.40 mg/dL    Calcium 8.7 8.5 - 10.5 mg/dL    AST(SGOT) 29 12 - 45 U/L    ALT(SGPT) 21 2 - 50 U/L    Alkaline Phosphatase 93 30 - 99 U/L    Total Bilirubin 0.4 0.1 - 1.5 mg/dL    Albumin 3.5 3.2 - 4.9 g/dL    Total Protein 5.7 (L) 6.0 - 8.2 g/dL    Globulin 2.2 1.9 - 3.5 g/dL    A-G Ratio 1.6 g/dL   Troponins NOW   Result Value Ref Range    Troponin T 21 (H) 6 - 19 ng/L   Troponins in two (2) hours   Result Value Ref Range    Troponin T 18 6 - 19 ng/L   proBrain Natriuretic Peptide, NT   Result Value Ref Range    NT-proBNP 482 (H) 0 - 125 pg/mL   CORRECTED CALCIUM   Result Value Ref Range    Correct Calcium 9.1 8.5 - 10.5 mg/dL   ESTIMATED GFR   Result Value Ref Range    GFR (CKD-EPI) 103 >60 mL/min/1.73 m 2   EKG (NOW)   Result Value Ref Range    Report       [facility] Emergency Dept.    Test Date:  2023  Pt Name:    LUIS MATIAS         Department: ER  MRN:        [id number]                      Room:  Gender:     Female                       Technician: [id number]  :        [id number]                   Requested By:ER TRIAGE PROTOCOL  Order #:    [id number]                    Reading MD: EUGENE DAY MD    Measurements  Intervals                                Axis  Rate:       71                           P:          37  AZ:         131                          QRS:         77  QRSD:       133                          T:          41  QT:         394  QTc:        429    Interpretive Statements  Sinus rhythm  Nonspecific intraventricular conduction delay  Compared to ECG 05/05/2023 08:50:18  Intraventricular conduction delay now present  Sinus bradycardia no longer present  Electronically Signed On 5- 6:49:52 PDT by EUGENE DAY MD           RADIOLOGY  I have independently interpreted the diagnostic imaging associated with this visit and am waiting the final reading from the radiologist.   My preliminary interpretation is as follows: No overt consolidation possible effusions bilaterally      Radiologist interpretation:   DX-CHEST-PORTABLE (1 VIEW)   Final Result         1.  Hazy bilateral lower lobe infiltrates.   2.  Layering bilateral pleural effusions           COURSE & MEDICAL DECISION MAKING    ED COURSE:    ED Observation Status? Yes; Patient placed in observation status at 6:46 AM 5/14/2023    Observation plan is as follows: Patient does have edema the concern is for the possibility of renal failure versus heart failure.  The operative site appears well and I do not feel the patient has an obstruction.  I will evaluate for metabolic derangement.    INTERVENTIONS BY ME:  Medications - No data to display    Response on recheck: Patient is improved..      I have discussed management of the patient with the following physicians and sources:   None    Patient discharged from ED observation at 10:01 AM 05/14/23  .      INITIAL ASSESSMENT, COURSE AND PLAN  Care Narrative: Patient presents for evaluation.  The patient does have some mild pitting edema to both lower extremities.  Lung sounds are clear to auscultation she is not hypoxic and not tachycardic.  EKG was obtained shows no acute ST changes.  Her troponin was initially slightly elevated at 21 upon repeat is down to 18.  BNP slightly elevated.  Chest x-ray does show some mild pleural effusions but no overt signs of  vascular congestion.  Renal functions are normal.  At this point I do feel that her peripheral edema and shortness of breath most likely is secondary to some fluid overload from her surgery she has only been home for couple of days.  She is severely malnourished her protein level is low but albumin is improving and her hemoglobin is also improving.  At this point I will start the patient with compression stockings and a short course of 2 weeks of Lasix 20 mg daily.  I did warn the patient that her blood pressure normally is slightly low that this can make it even lower.  If she were to have symptoms of dizziness and near syncope she is to stop the Lasix.  Patient is to follow-up with Dr. Witt in the next week as an regular follow-up.  I recommend to follow-up with him for recheck and possible referral to cardiology for echocardiography if she still has continued swelling.  I do feel its most likely stress is secondary to the surgery some mild fluid overloaded during the surgery and her protein malnutrition.  At this point we will start her on the compression stockings with Lasix and potassium and she is to follow-up as an outpatient for further treatment and care.                  MEDICAL DECISION MAKING:  PROBLEMS EVALUATED THIS VISIT:  Peripheral edema with possibility of heart failure and acute coronary syndrome    RISK:  Concerns are for acute coronary syndrome infection renal failure.  Laboratory studies support that these are all not significant we will start with outpatient therapy initially as described above.      FINAL DIAGNOSIS  1. Peripheral edema    2. Protein-calorie malnutrition, unspecified severity (HCC)            The patient will return for new or worsening symptoms and is stable at the time of discharge.    The patient is referred to a primary physician for blood pressure management, diabetic screening, and for all other preventative health concerns.        DISPOSITION:  Patient will be  discharged home in stable condition.    FOLLOW UP:  No follow-up provider specified.    OUTPATIENT MEDICATIONS:  Discharge Medication List as of 5/14/2023  9:47 AM        START taking these medications    Details   furosemide (LASIX) 20 MG Tab Take 1 Tablet by mouth every day for 14 days., Disp-14 Tablet, R-0, Normal      potassium chloride SA (KDUR) 20 MEQ Tab CR Take 1 Tablet by mouth every day for 14 days., Disp-14 Tablet, R-0, Normal               Electronically signed by: Lloyd Johnson M.D., 5/14/2023  6:31 AM

## 2023-05-14 NOTE — ED NOTES
Pt discharged, all appropriate hospital equipment removed (IV, monitor, pulse ox, etc.). Pt left unit via walking with partner to vehicle for home. Personal belongings with pt when leaving unit. Pt given discharge instructions prior to leaving unit including where to  prescriptions and when to follow-up; verbalizes understanding. Pt informed to return to ED if symptoms worsen/return or altered status develop. Copy of discharge instructions signed and turned into DC basket and copy sent with pt. F/u with cards, pt given compression stockings and applied in ER

## 2023-05-19 ENCOUNTER — TELEPHONE (OUTPATIENT)
Dept: HEALTH INFORMATION MANAGEMENT | Facility: OTHER | Age: 68
End: 2023-05-19
Payer: MEDICARE

## 2023-05-24 ENCOUNTER — OFFICE VISIT (OUTPATIENT)
Dept: SURGICAL ONCOLOGY | Facility: MEDICAL CENTER | Age: 68
End: 2023-05-24
Payer: MEDICARE

## 2023-05-24 VITALS
TEMPERATURE: 99 F | DIASTOLIC BLOOD PRESSURE: 60 MMHG | WEIGHT: 61 LBS | OXYGEN SATURATION: 99 % | HEART RATE: 74 BPM | BODY MASS INDEX: 10.81 KG/M2 | SYSTOLIC BLOOD PRESSURE: 88 MMHG

## 2023-05-24 DIAGNOSIS — K30 DELAYED GASTRIC EMPTYING: ICD-10-CM

## 2023-05-24 DIAGNOSIS — K86.1 CHRONIC PANCREATITIS, UNSPECIFIED PANCREATITIS TYPE (HCC): ICD-10-CM

## 2023-05-24 DIAGNOSIS — K80.50 BILIARY COLIC: ICD-10-CM

## 2023-05-24 PROCEDURE — 3074F SYST BP LT 130 MM HG: CPT | Performed by: SPECIALIST

## 2023-05-24 PROCEDURE — 99024 POSTOP FOLLOW-UP VISIT: CPT | Performed by: SPECIALIST

## 2023-05-24 PROCEDURE — 3078F DIAST BP <80 MM HG: CPT | Performed by: SPECIALIST

## 2023-05-24 ASSESSMENT — ENCOUNTER SYMPTOMS
CHILLS: 0
HEARTBURN: 0
ABDOMINAL PAIN: 0
FEVER: 0
SHORTNESS OF BREATH: 0
MYALGIAS: 0
VOMITING: 0
NAUSEA: 0

## 2023-05-24 ASSESSMENT — FIBROSIS 4 INDEX: FIB4 SCORE: 1.24

## 2023-05-24 NOTE — PROGRESS NOTES
Subjective:   5/24/2023  2:24 PM  Primary care physician: Pcp Not In Computer  Referring Provider: Femi Gutierrez MD    Chief Complaint:   Chief Complaint   Patient presents with    Follow-Up     Diagnosis:   1. Delayed gastric emptying        2. Biliary colic        3. Chronic pancreatitis, unspecified pancreatitis type (HCC)            History of presenting illness:     Kathy Dumont is a pleasant 67 y.o. female with hx of anorexia nervosa, celiac disease(?), idiopathic gastroparesis, and idiopathic recurrent pancreatitis. She has a long GI history and was previously seen at [facility] and at  in [place], [place]. Her latest weight is 29 kg, which is a slight decrease from December. She has previous attempted numerous medications including Reglan, Creon, Linzess, Domporidon. Her diet consists mostly of jello, baby food, ice cream, Boost drinks. She previously tried working with a nurtitionist - but had no clear results.     GASTRIC EMPTYING STUDY on 7/20/21 noted significant retained gastric activity at 4 hours indicating delayed gastric emptying.     EGD on 8/9/21 noted   EGD FINDINGS:  1.  Esophagus:  Unremarkable mucosa.  2.  Long and J-shaped stomach, otherwise unremarkable mucosa.  3.  Mildly stenotic pylorus, successfully dilated to 20 mm with balloon.  4.  Duodenum:  Unremarkable mucosa.  Possible extrinsic compression of the second portion.     ENDOSCOPIC ULTRASOUND FINDINGS:  1.  Celiac axis, no adenopathy.  2.  Pancreatic body and tail, normal parenchyma.  3.  Pancreatic duct, normal course and caliber.  4.  Gallbladder, surgically absent.  5.  Biliary ampulla, unremarkable.  6.  Common bile duct, generous caliber down to the level of the ampulla.  Normal course.  Small subtle nonshadowing filling defect suspicious of stone versus sludge ball.  7.  Head of pancreas, normal parenchyma and normal dorsal ventral transition.     ERCP FINDINGS:  1.  Biliary ampulla, small, but otherwise unremarkable  appearance.  Stenotic to wire and cannula passage.  2.  Pancreatic duct, neither injected nor cannulated.  3.  Common bile duct, normal course.  Generous caliber.  Small distal filling defect consistent with small stone.     EGD by Dr Cadet on 4/18/23 noted unremarkable mucosa, and included intersphincteric injection of Botox - 100U divided 4 quadrants. Hydraulic dilation of pylorus 18-19-20 mm balloon 10-11-12 mm hydraulic dilation of biliary ampulla.     Updated NM GASTRIC EMPTYING study on 4/27/23 noted gastric emptying halftime measured at 204 minutes consistent with delayed gastric emptying. This previously measured approximately 223.5 minutes     She is here today for evaluation for what appears to be chronic gastric outlet obstruction from hypertrophic pylorus, gastroparesis, history of anorexia, and severe malnutrition.  The patient's BMI is 11.21 and she only weighs 29 kg.  This has occurred over the last 4 to 5 years.  She did have a history of anorexia but that resolved over 12 years ago.  The patient appears very cachectic.  She is alert and oriented x3 and very pleasant.  The patient has been scope by gastroenterology multiple times and has had multiple injections of Botox into the pylorus with pyloric dilations.  She states that last for about 2 weeks and then she can eat again.  She is presently on liquids only and continues to lose weight.  We did send her for an updated gastric emptying study and it is positive with delayed of 204 minutes.  She is not taking any narcotics or any type of motility inhibitor.  She is here with her  discuss neck steps.  We have no imaging.  What is also unusual as the patient has suffered multiple bouts of pancreatitis.  She has had some common duct stones that were extracted 2 to 3 years ago and ever since then she has had intermittent pancreatitis.  We have no recent imaging of her pancreas.  She has no family history of malignancies, she is never had a  malignancy.  She had an open cholecystectomy when she was 11 years old.  It is unclear the reason why.  She is here with her  to discuss what options she has.     I have reviewed the notes from GI, and other available records.    Update 5/24/23  She subsequently 1 underwent an Sinai-en-Y gastrojejunostomy bypass on May 5, 2023.  She presents for 2-week postoperative examination and staple removal.  She informs me that she did the surgery has helped and she is able to eat better.  She is having multiple bowel movements in a day which are soft.  She used to not be able to tolerate food prior to her surgery.  She use to be constipated for up to 7 to 10 days.  She had presented to the emergency room on the 15th for swelling.  She was placed on a diuretic discharged home.  She informs me that her swelling has improved significantly.  Has lost 1 pound however that is being on diuretics and also starting to eat more small and frequent meals.  She informs me she was able to take a bite of spaghetti.  She has expressed immense gratitude to our team, and Dr. Gutierrez for referring him to us and Dr. Garcia's NP for referring to Dr. Gutierrez.    Past Medical History:   Diagnosis Date    Acute recurrent pancreatitis 08/2021    Anesthesia     PONV    Arthritis 08/2021    fingers    Celiac disease     Disorder of thyroid     hypothyroid    Gastroparesis 08/2021    Gluten intolerance     High cholesterol     PONV (postoperative nausea and vomiting)      Past Surgical History:   Procedure Laterality Date    GASTROJEJUNOSTOMY N/A 5/5/2023    Procedure: SINAI-EN-Y GASTROJEJUNOSTOMY BYPASS AND EXPLORATORY LAPAORTOMY;  Surgeon: Bradly Cisneros M.D.;  Location: SURGERY [facility];  Service: General    DC UPPER GI ENDOSCOPY,DIAGNOSIS  4/18/2022    Procedure: GASTROSCOPY - WITH PYLORUS DILATION WITH BOTOX INJECTION;  Surgeon: Rivas Cadet M.D.;  Location: SURGERY [facility];  Service: Gastroenterology    DC  "ERCP,DIAGNOSTIC  2022    Procedure: ERCP (ENDOSCOPIC RETROGRADE CHOLANGIOPANCREATOGRAPHY);  Surgeon: Rivas Cadet M.D.;  Location: [facility];  Service: Gastroenterology    [place] UPPER GI ENDOSCOPY,DIAGNOSIS N/A 2021    Procedure: GASTROSCOPY;  Surgeon: Rivas Cadet M.D.;  Location: [facility];  Service: EUS    [place] ERCP,DIAGNOSTIC N/A 2021    Procedure: ERCP, DIAGNOSTIC;  Surgeon: Rivas Cadet M.D.;  Location: [facility];  Service: EUS    EGD W/ENDOSCOPIC ULTRASOUND N/A 2021    Procedure: EGD, WITH ENDOSCOPIC US - UPPER RADIAL;  Surgeon: Rivas Cadet M.D.;  Location: [facility];  Service: EUS    ORIF, HUMERUS Left     KNEE ARTHROSCOPY Left     CHOLECYSTECTOMY  1963    APPENDECTOMY CHILD      PRIMARY C SECTION  , ,      Allergies   Allergen Reactions    Hydrocodone-Acetaminophen Rash     Rash, \"I can tolerate in small amounts\"      Loratadine-Pseudoephedrine Rash     rash    Sertraline Hcl Palpitations    Tramadol Vomiting     Fainting    Zoloft Rash     rash    Codeine Vomiting    Gluten Meal      \"violent stomach upset for 4-5 days\"     Outpatient Encounter Medications as of 2023   Medication Sig Dispense Refill    furosemide (LASIX) 20 MG Tab Take 1 Tablet by mouth every day for 14 days. 14 Tablet 0    potassium chloride SA (KDUR) 20 MEQ Tab CR Take 1 Tablet by mouth every day for 14 days. 14 Tablet 0    [] celecoxib (CELEBREX) 200 MG Cap Take 1 Capsule by mouth every day with lunch for 7 days. 7 Capsule 0    [] metoclopramide (REGLAN) 5 MG tablet Take 1 Tablet by mouth four times daily - before meals and nightly as needed (Take as needed for appetite stimulation) for up to 10 days. 40 Tablet 0    omeprazole (PRILOSEC) 20 MG delayed-release capsule Take 1 Capsule by mouth every day for 30 days. 30 Capsule 0    thiamine (THIAMINE) 100 MG tablet Take 1 Tablet by mouth every day for 14 " days. 15 Tablet 0    [] docusate sodium (COLACE) 100 MG Cap Take 1 Capsule by mouth 2 times a day for 10 days. Hold for loose stools 20 Capsule 0    [] HYDROcodone-acetaminophen (NORCO) 5-325 MG Tab per tablet Take 1 Tablet by mouth every four hours as needed (moderate to severe pain) for up to 7 days. 20 Tablet 0    ondansetron (ZOFRAN) 4 MG Tab tablet ondansetron HCl 4 mg tablet   TAKE 1 TABLET BY MOUTH EVERY 8 HOURS AS NEEDED FOR NAUSEA      Multiple Vitamins-Minerals (IMMUNE SUPPORT) Chew Tab Chew every day.      levothyroxine (SYNTHROID) 50 MCG Tab Take 50 mcg by mouth every morning on an empty stomach.      Multiple Vitamins-Minerals (MULTIVITAMIN ADULT) Chew Tab Chew every day.      Calcium Carb-Cholecalciferol (CALCIUM + VITAMIN D3) 500-400 MG-UNIT Chew Tab Chew every day.       No facility-administered encounter medications on file as of 2023.     Social History     Socioeconomic History    Marital status:      Spouse name: Not on file    Number of children: Not on file    Years of education: Not on file    Highest education level: Not on file   Occupational History    Not on file   Tobacco Use    Smoking status: Never    Smokeless tobacco: Never   Vaping Use    Vaping Use: Never used   Substance and Sexual Activity    Alcohol use: Never    Drug use: Yes     Types: Oral     Comment: cbd/thc, daily for pain - last took 5/3/2023    Sexual activity: Not on file   Other Topics Concern    Not on file   Social History Narrative    Not on file     Social Determinants of Health     Financial Resource Strain: Not on file   Food Insecurity: Not on file   Transportation Needs: Not on file   Physical Activity: Not on file   Stress: Not on file   Social Connections: Not on file   Intimate Partner Violence: Not on file   Housing Stability: Not on file      Social History     Tobacco Use   Smoking Status Never   Smokeless Tobacco Never   Vaping Use    Vaping Use: Never used     Social History      Substance and Sexual Activity   Alcohol Use Never     Social History     Substance and Sexual Activity   Drug Use Yes    Types: Oral    Comment: cbd/thc, daily for pain - last took 5/3/2023      No family history on file.      Review of Systems   Constitutional:  Negative for chills and fever.   Respiratory:  Negative for shortness of breath.    Cardiovascular:  Negative for chest pain.   Gastrointestinal:  Negative for abdominal pain, heartburn, nausea and vomiting.   Genitourinary:  Positive for frequency. Negative for dysuria, hematuria and urgency.   Musculoskeletal:  Negative for myalgias.        Objective:   BP (!) 88/60 (BP Location: Left arm, Patient Position: Sitting, BP Cuff Size: Adult)   Pulse 74   Temp 37.2 °C (99 °F) (Temporal)   Wt 27.7 kg (61 lb)   SpO2 99%   BMI 10.81 kg/m²     Physical Exam  Vitals and nursing note reviewed.   Constitutional:       Appearance: Normal appearance.   Cardiovascular:      Rate and Rhythm: Normal rate and regular rhythm.   Pulmonary:      Effort: Pulmonary effort is normal.      Breath sounds: Normal breath sounds.   Abdominal:      General: Abdomen is flat. There is no distension.      Palpations: Abdomen is soft.      Tenderness: There is no abdominal tenderness.      Comments: Staples removed, incision healing well   Skin:     General: Skin is warm and dry.   Neurological:      Mental Status: She is alert and oriented to person, place, and time.   Psychiatric:         Mood and Affect: Mood normal.         Behavior: Behavior normal.         Labs  Lab Results   Component Value Date/Time    WBC 5.8 05/14/2023 06:29 AM    RBC 3.27 (L) 05/14/2023 06:29 AM    HEMOGLOBIN 10.8 (L) 05/14/2023 06:29 AM    HEMATOCRIT 31.4 (L) 05/14/2023 06:29 AM    MCV 96.0 05/14/2023 06:29 AM    MCH 33.0 05/14/2023 06:29 AM    MCHC 34.4 05/14/2023 06:29 AM    MPV 9.3 05/14/2023 06:29 AM    NEUTSPOLYS 79.60 (H) 05/14/2023 06:29 AM    LYMPHOCYTES 15.10 (L) 05/14/2023 06:29 AM     MONOCYTES 3.80 05/14/2023 06:29 AM    EOSINOPHILS 0.70 05/14/2023 06:29 AM    BASOPHILS 0.30 05/14/2023 06:29 AM      Lab Results   Component Value Date/Time    SODIUM 140 05/14/2023 06:29 AM    POTASSIUM 3.8 05/14/2023 06:29 AM    CHLORIDE 105 05/14/2023 06:29 AM    CO2 24 05/14/2023 06:29 AM    GLUCOSE 103 (H) 05/14/2023 06:29 AM    BUN 8 05/14/2023 06:29 AM    CREATININE 0.49 (L) 05/14/2023 06:29 AM      Imaging  NM GASTRIC EMPTYING STUDY (4/27/23)  IMPRESSION:  Gastric emptying halftime measured at 204 minutes consistent with delayed gastric emptying. This previously measured approximately 223.5 minutes        NM GASTRIC EMPTYING STUDY (7/20/21)  FINDINGS:     After observation the activity remaining in the stomach is as follows:     Immediately:  100%     1 hour:  68%     2 hours:   59%     4 hours:   47%     IMPRESSION:     Significant retained gastric activity at 4 hours indicating delayed gastric emptying.     Pathology  N/A     Procedures  PROCEDURE (5/5/23)  Sinai-en-Y gastrojejunostomy    PROCEDURE (4/18/23)  GASTROSCOPY - WITH PYLORUS DILATION WITH BOTOX INJECTION - Wound Class: Clean Contaminated  ERCP (ENDOSCOPIC RETROGRADE CHOLANGIOPANCREATOGRAPHY) - Wound Class: Clean Contaminated     Findings:              EGD                          Esophagus                                      Unremarkable mucosa                          Stomach                                      Unremarkable mucosa                                      Narrow caliber pylorus                          Duodenum                                      Unremarkable mucosa                 ERCP                          Ampulla                                      Post sphincterotomy anatomy                          Pancreatic duct                                      Neither injected nor cannulated                          CBD                                      Normal course and caliber                 Therapeutics                           Intersphincteric injection of Botox - 100U divided 4 quadrants                          Hydraulic dilation of pylorus 18-19-20 mm balloon                          10-11-12 mm hydraulic dilation of biliary ampulla                          Balloon sweeps negative for stones or debris     PROCEDURE (8/9/21)  1.  Esophagogastroduodenoscopy with hydraulic dilation of gastric outlet.  2.  Endoscopic ultrasound, upper.  3.  Endoscopic retrograde cholangiopancreatography with biliary   sphincterotomy, bile duct balloon soft stone debris extraction, radiologic   interpretation of static and dynamic fluoroscopic images by myself, at no time   was a radiologist present in the room.     EGD FINDINGS:  1.  Esophagus:  Unremarkable mucosa.  2.  Long and J-shaped stomach, otherwise unremarkable mucosa.  3.  Mildly stenotic pylorus, successfully dilated to 20 mm with balloon.  4.  Duodenum:  Unremarkable mucosa.  Possible extrinsic compression of the   second portion.     ENDOSCOPIC ULTRASOUND FINDINGS:  1.  Celiac axis, no adenopathy.  2.  Pancreatic body and tail, normal parenchyma.  3.  Pancreatic duct, normal course and caliber.  4.  Gallbladder, surgically absent.  5.  Biliary ampulla, unremarkable.  6.  Common bile duct, generous caliber down to the level of the ampulla.    Normal course.  Small subtle nonshadowing filling defect suspicious of stone   versus sludge ball.  7.  Head of pancreas, normal parenchyma and normal dorsal ventral transition.     ERCP FINDINGS:  1.  Biliary ampulla, small, but otherwise unremarkable appearance.  Stenotic   to wire and cannula passage.  2.  Pancreatic duct, neither injected nor cannulated.  3.  Common bile duct, normal course.  Generous caliber.  Small distal filling   defect consistent with small stone.  CT - NNN (MM/DD/YYYY)         Diagnosis:     1. Delayed gastric emptying        2. Biliary colic        3. Chronic pancreatitis, unspecified pancreatitis type (HCC)             Medical Decision Making:  Today's Assessment / Status / Plan:     Patient continues to do well.  Discussed with her trying more different types of foods to see how she tolerates it.  She is aware to follow-up with a cardiologist per the recommendation of the ER physician.  Discussed with Dr. Cisneros.  Plan to see her back in 3 weeks for a postop examination with Dr. Cisneros.  We have discussed wound care if she has any questions or concerns she is to notify our office.    Thank you for the opportunity for allowing me to participate in her care.  If you have any further questions or fI could be of any further assistance, please do not hesitate to contact me.    Sincerely yours,  Yue Burch PA-C

## 2023-05-31 NOTE — PROGRESS NOTES
Subjective:   6/13/2023  2:15 PM  Primary care physician: Pcp Not In Computer  Referring Provider: Femi Gutierrez M.D.    Chief Complaint:   Chief Complaint   Patient presents with    Other     MALNUTRITION  GASTRIC OBSTRUCT  R-EN-Y 5/5/23  FOLLOW UP     Diagnosis:   1. Delayed gastric emptying        2. Chronic pancreatitis, unspecified pancreatitis type (HCC)        3. Partial gastric outlet obstruction        4. History of Sinai-en-Y gastric bypass          History of presenting illness:    Kathy Dumont is a pleasant 67 y.o. female with hx of anorexia nervosa, celiac disease(?), idiopathic gastroparesis, and idiopathic recurrent pancreatitis. She has a long GI history and was previously seen at [facility] and at  in [place], [place]. Her latest weight is 29 kg, which is a slight decrease from December. She has previous attempted numerous medications including Reglan, Creon, Linzess, Domporidon. Her diet consists mostly of jello, baby food, ice cream, Boost drinks. She previously tried working with a nurtitionist - but had no clear results.     GASTRIC EMPTYING STUDY on 7/20/21 noted significant retained gastric activity at 4 hours indicating delayed gastric emptying.     EGD on 8/9/21 noted   EGD FINDINGS:  1.  Esophagus:  Unremarkable mucosa.  2.  Long and J-shaped stomach, otherwise unremarkable mucosa.  3.  Mildly stenotic pylorus, successfully dilated to 20 mm with balloon.  4.  Duodenum:  Unremarkable mucosa.  Possible extrinsic compression of the second portion.     ENDOSCOPIC ULTRASOUND FINDINGS:  1.  Celiac axis, no adenopathy.  2.  Pancreatic body and tail, normal parenchyma.  3.  Pancreatic duct, normal course and caliber.  4.  Gallbladder, surgically absent.  5.  Biliary ampulla, unremarkable.  6.  Common bile duct, generous caliber down to the level of the ampulla.  Normal course.  Small subtle nonshadowing filling defect suspicious of stone versus sludge ball.  7.  Head of pancreas, normal  Spoke with patient, reminded to have  PSA done 3 to 5 days before he sees DR Mack at follow up appt on 10/1/2020 for prostate cancer    parenchyma and normal dorsal ventral transition.     ERCP FINDINGS:  1.  Biliary ampulla, small, but otherwise unremarkable appearance.  Stenotic to wire and cannula passage.  2.  Pancreatic duct, neither injected nor cannulated.  3.  Common bile duct, normal course.  Generous caliber.  Small distal filling defect consistent with small stone.     EGD by Dr Cadet on 4/18/23 noted unremarkable mucosa, and included intersphincteric injection of Botox - 100U divided 4 quadrants. Hydraulic dilation of pylorus 18-19-20 mm balloon 10-11-12 mm hydraulic dilation of biliary ampulla.     Updated NM GASTRIC EMPTYING study on 4/27/23 noted gastric emptying halftime measured at 204 minutes consistent with delayed gastric emptying. This previously measured approximately 223.5 minutes     She is here today for evaluation for what appears to be chronic gastric outlet obstruction from hypertrophic pylorus, gastroparesis, history of anorexia, and severe malnutrition.  The patient's BMI is 11.21 and she only weighs 29 kg.  This has occurred over the last 4 to 5 years.  She did have a history of anorexia but that resolved over 12 years ago.  The patient appears very cachectic.  She is alert and oriented x3 and very pleasant.  The patient has been scope by gastroenterology multiple times and has had multiple injections of Botox into the pylorus with pyloric dilations.  She states that last for about 2 weeks and then she can eat again.  She is presently on liquids only and continues to lose weight.  We did send her for an updated gastric emptying study and it is positive with delayed of 204 minutes.  She is not taking any narcotics or any type of motility inhibitor.  She is here with her  discuss neck steps.  We have no imaging.  What is also unusual as the patient has suffered multiple bouts of pancreatitis.  She has had some common duct stones that were extracted 2 to 3 years ago and ever since then she has had intermittent  pancreatitis.  We have no recent imaging of her pancreas. She has no family history of malignancies, she is never had a malignancy.  She had an open cholecystectomy when she was 11 years old.  It is unclear the reason why.  She is here with her  to discuss what options she has. I have reviewed the notes from GI, and other available records.    Update 5/24/23  She subsequently underwent an Sinai-en-Y gastrojejunostomy bypass on May 5, 2023.  She presented for a 2-week postoperative examination and staple removal.  She informed us that she did the surgery has helped and she is able to eat better.  She is having multiple bowel movements in a day which are soft  She used to not be able to tolerate food prior to her surgery.  She use to be constipated for up to 7 to 10 days.  She had presented to the emergency room on the 15th for swelling.  She was placed on a diuretic discharged home.  She informs me that her swelling has improved significantly.  Has lost 1 pound however that is being on diuretics and also starting to eat more small and frequent meals.  She informs me she was able to take a bite of spaghetti.  She has expressed immense gratitude to our team, and Dr. Gutierrez for referring him to us and Dr. Garcia's NP for referring to Dr. Gutierrez.    Update 6/13/23  She is here today for a four week post operative examination.  This is the patient's last postop visit.  She actually has increased the volume of food according to her  and herself.  She intermittently has some nausea but is managing that with Zofran that she has.  Other than that she has no complaints.    Past Medical History:   Diagnosis Date    Acute recurrent pancreatitis 08/2021    Anesthesia     PONV    Arthritis 08/2021    fingers    Celiac disease     Disorder of thyroid     hypothyroid    Gastroparesis 08/2021    Gluten intolerance      Past Surgical History:   Procedure Laterality Date    GASTROJEJUNOSTOMY N/A 5/5/2023    Procedure:  "MAIK-EN-Y GASTROJEJUNOSTOMY BYPASS AND EXPLORATORY LAPAORTOMY;  Surgeon: Bradly Cisneros M.D.;  Location: [facility];  Service: General    [place] UPPER GI ENDOSCOPY,DIAGNOSIS  2022    Procedure: GASTROSCOPY - WITH PYLORUS DILATION WITH BOTOX INJECTION;  Surgeon: Rivas Cadet M.D.;  Location: [facility];  Service: Gastroenterology    AZ ERCP,DIAGNOSTIC  2022    Procedure: ERCP (ENDOSCOPIC RETROGRADE CHOLANGIOPANCREATOGRAPHY);  Surgeon: Rivas Cadet M.D.;  Location: [facility];  Service: Gastroenterology    AZ UPPER GI ENDOSCOPY,DIAGNOSIS N/A 2021    Procedure: GASTROSCOPY;  Surgeon: Rivas Cadet M.D.;  Location: [facility];  Service: EUS    [place] ERCP,DIAGNOSTIC N/A 2021    Procedure: ERCP, DIAGNOSTIC;  Surgeon: Rivas Cadet M.D.;  Location: [facility];  Service: EUS    EGD W/ENDOSCOPIC ULTRASOUND N/A 2021    Procedure: EGD, WITH ENDOSCOPIC US - UPPER RADIAL;  Surgeon: Rivas Cadet M.D.;  Location: [facility];  Service: EUS    ORIF, HUMERUS Left 2006    KNEE ARTHROSCOPY Left 1973    CHOLECYSTECTOMY  1963    APPENDECTOMY CHILD      PRIMARY C SECTION  , ,      Allergies   Allergen Reactions    Hydrocodone-Acetaminophen Rash     Rash, \"I can tolerate in small amounts\"      Loratadine-Pseudoephedrine Rash     rash    Sertraline Hcl Palpitations    Tramadol Vomiting     Fainting    Zoloft Rash     rash    Codeine Vomiting    Gluten Meal      \"violent stomach upset for 4-5 days\"     Outpatient Encounter Medications as of 2023   Medication Sig Dispense Refill    [] omeprazole (PRILOSEC) 20 MG delayed-release capsule Take 1 Capsule by mouth every day for 30 days. (Patient not taking: Reported on 2023) 30 Capsule 0    ondansetron (ZOFRAN) 4 MG Tab tablet ondansetron HCl 4 mg tablet   TAKE 1 TABLET BY MOUTH EVERY 8 HOURS AS NEEDED FOR NAUSEA      Multiple " Vitamins-Minerals (IMMUNE SUPPORT) Chew Tab Chew every day.      levothyroxine (SYNTHROID) 50 MCG Tab Take 50 mcg by mouth every morning on an empty stomach.      Multiple Vitamins-Minerals (MULTIVITAMIN ADULT) Chew Tab Chew every day.      Calcium Carb-Cholecalciferol (CALCIUM + VITAMIN D3) 500-400 MG-UNIT Chew Tab Chew every day.       No facility-administered encounter medications on file as of 6/13/2023.     Social History     Socioeconomic History    Marital status:      Spouse name: Not on file    Number of children: Not on file    Years of education: Not on file    Highest education level: Not on file   Occupational History    Not on file   Tobacco Use    Smoking status: Never    Smokeless tobacco: Never   Vaping Use    Vaping Use: Never used   Substance and Sexual Activity    Alcohol use: Never    Drug use: Yes     Types: Oral     Comment: cbd/thc, daily for pain - last took 5/3/2023    Sexual activity: Not on file   Other Topics Concern    Not on file   Social History Narrative    Not on file     Social Determinants of Health     Financial Resource Strain: Not on file   Food Insecurity: Not on file   Transportation Needs: Not on file   Physical Activity: Not on file   Stress: Not on file   Social Connections: Not on file   Intimate Partner Violence: Not on file   Housing Stability: Not on file      Social History     Tobacco Use   Smoking Status Never   Smokeless Tobacco Never   Vaping Use    Vaping Use: Never used     Social History     Substance and Sexual Activity   Alcohol Use Never     Social History     Substance and Sexual Activity   Drug Use Yes    Types: Oral    Comment: cbd/thc, daily for pain - last took 5/3/2023      History reviewed. No pertinent family history.    Review of Systems   Gastrointestinal:  Positive for nausea.   All other systems reviewed and are negative.       Objective:   /76 (BP Location: Right arm, Patient Position: Sitting, BP Cuff Size: Adult)   Pulse 65    Temp 37.2 °C (99 °F) (Temporal)   Wt 28.1 kg (62 lb)   SpO2 95%   BMI 10.98 kg/m²     Physical Exam  Abdominal:      General: Abdomen is flat. Bowel sounds are normal.      Palpations: Abdomen is soft.      Comments: Her incision is clean dry and intact and she has no pain.         Labs   Latest Reference Range & Units 05/14/23 06:29   WBC 4.8 - 10.8 K/uL 5.8   RBC 4.20 - 5.40 M/uL 3.27 (L)   Hemoglobin 12.0 - 16.0 g/dL 10.8 (L)   Hematocrit 37.0 - 47.0 % 31.4 (L)   MCV 81.4 - 97.8 fL 96.0   MCH 27.0 - 33.0 pg 33.0   MCHC 33.6 - 35.0 g/dL 34.4   RDW 35.9 - 50.0 fL 44.1   Platelet Count 164 - 446 K/uL 343   MPV 9.0 - 12.9 fL 9.3   (L): Data is abnormally low      Latest Reference Range & Units 05/14/23 06:29   Sodium 135 - 145 mmol/L 140   Potassium 3.6 - 5.5 mmol/L 3.8   Chloride 96 - 112 mmol/L 105   Co2 20 - 33 mmol/L 24   Anion Gap 7.0 - 16.0  11.0   Glucose 65 - 99 mg/dL 103 (H)   Bun 8 - 22 mg/dL 8   Creatinine 0.50 - 1.40 mg/dL 0.49 (L)   GFR (CKD-EPI) >60 mL/min/1.73 m 2 103   Calcium 8.5 - 10.5 mg/dL 8.7   Correct Calcium 8.5 - 10.5 mg/dL 9.1   AST(SGOT) 12 - 45 U/L 29   ALT(SGPT) 2 - 50 U/L 21   Alkaline Phosphatase 30 - 99 U/L 93   Total Bilirubin 0.1 - 1.5 mg/dL 0.4   Albumin 3.2 - 4.9 g/dL 3.5   Total Protein 6.0 - 8.2 g/dL 5.7 (L)   Globulin 1.9 - 3.5 g/dL 2.2   A-G Ratio g/dL 1.6   Troponin T 6 - 19 ng/L 21 (H)   NT-proBNP 0 - 125 pg/mL 482 (H)   (H): Data is abnormally high  (L): Data is abnormally low     Imaging  CT ABDOMEN (5/3/23)  IMPRESSION:  1.  Prior cholecystectomy.  2.  Pneumobilia.  3.  No significant pancreatic abnormality.  4.  Abdominal aortic atherosclerosis without aneurysm.    NM GASTRIC EMPTYING STUDY (4/27/23)  IMPRESSION:  Gastric emptying halftime measured at 204 minutes consistent with delayed gastric emptying. This previously measured approximately 223.5 minutes        NM GASTRIC EMPTYING STUDY (7/20/21)  FINDINGS:     After observation the activity remaining in the stomach  is as follows:     Immediately:  100%     1 hour:  68%     2 hours:   59%     4 hours:   47%     IMPRESSION:     Significant retained gastric activity at 4 hours indicating delayed gastric emptying.     Pathology  N/A     Procedures  SURGERY (5/5/23)  PROCEDURE:  Sinai-en-Y gastrojejunostomy.    PROCEDURE (4/18/23)  GASTROSCOPY - WITH PYLORUS DILATION WITH BOTOX INJECTION - Wound Class: Clean Contaminated  ERCP (ENDOSCOPIC RETROGRADE CHOLANGIOPANCREATOGRAPHY) - Wound Class: Clean Contaminated     Findings:              EGD                          Esophagus                                      Unremarkable mucosa                          Stomach                                      Unremarkable mucosa                                      Narrow caliber pylorus                          Duodenum                                      Unremarkable mucosa                 ERCP                          Ampulla                                      Post sphincterotomy anatomy                          Pancreatic duct                                      Neither injected nor cannulated                          CBD                                      Normal course and caliber                 Therapeutics                          Intersphincteric injection of Botox - 100U divided 4 quadrants                          Hydraulic dilation of pylorus 18-19-20 mm balloon                          10-11-12 mm hydraulic dilation of biliary ampulla                          Balloon sweeps negative for stones or debris     PROCEDURE (8/9/21)  1.  Esophagogastroduodenoscopy with hydraulic dilation of gastric outlet.  2.  Endoscopic ultrasound, upper.  3.  Endoscopic retrograde cholangiopancreatography with biliary   sphincterotomy, bile duct balloon soft stone debris extraction, radiologic   interpretation of static and dynamic fluoroscopic images by myself, at no time   was a radiologist present in the room.     EGD FINDINGS:  1.  Esophagus:   Unremarkable mucosa.  2.  Long and J-shaped stomach, otherwise unremarkable mucosa.  3.  Mildly stenotic pylorus, successfully dilated to 20 mm with balloon.  4.  Duodenum:  Unremarkable mucosa.  Possible extrinsic compression of the   second portion.     ENDOSCOPIC ULTRASOUND FINDINGS:  1.  Celiac axis, no adenopathy.  2.  Pancreatic body and tail, normal parenchyma.  3.  Pancreatic duct, normal course and caliber.  4.  Gallbladder, surgically absent.  5.  Biliary ampulla, unremarkable.  6.  Common bile duct, generous caliber down to the level of the ampulla.    Normal course.  Small subtle nonshadowing filling defect suspicious of stone   versus sludge ball.  7.  Head of pancreas, normal parenchyma and normal dorsal ventral transition.     ERCP FINDINGS:  1.  Biliary ampulla, small, but otherwise unremarkable appearance.  Stenotic   to wire and cannula passage.  2.  Pancreatic duct, neither injected nor cannulated.  3.  Common bile duct, normal course.  Generous caliber.  Small distal filling   defect consistent with small stone.       Diagnosis:     1. Delayed gastric emptying        2. Chronic pancreatitis, unspecified pancreatitis type (HCC)        3. Partial gastric outlet obstruction        4. History of Sinai-en-Y gastric bypass            Medical Decision Making:  Today's Assessment / Status / Plan:     In light of the present findings, the patient will continue advancing her diet.  She will follow-up with us as needed.  She is being discharged from the clinic.    I, Dr. Cisneros have entered, reviewed and confirmed the above diagnoses related to this patient on this date of service, 6/13/2023  2:15 PM.    She agreed and verbalized her agreement and understanding with the current plan. I answered all questions and concerns she has at this time and advised her to call at any time in the interim with questions or concerns in regards to her care.    Thank you for allowing me to participate in her care, I  will continue to follow closely.       Please note that this dictation was created using voice recognition software. I have made every reasonable attempt to correct obvious errors, but I expect that there are errors of grammar and possibly content that I did not discover before finalizing the note.     Thank you for this consultation and allowing me to participate in your patient's care. If I can be of further service please contact my office.

## 2023-06-13 ENCOUNTER — OFFICE VISIT (OUTPATIENT)
Dept: SURGICAL ONCOLOGY | Facility: MEDICAL CENTER | Age: 68
End: 2023-06-13
Payer: MEDICARE

## 2023-06-13 VITALS
WEIGHT: 62 LBS | BODY MASS INDEX: 10.98 KG/M2 | SYSTOLIC BLOOD PRESSURE: 100 MMHG | HEART RATE: 65 BPM | OXYGEN SATURATION: 95 % | DIASTOLIC BLOOD PRESSURE: 76 MMHG | TEMPERATURE: 99 F

## 2023-06-13 DIAGNOSIS — Z98.84 HISTORY OF ROUX-EN-Y GASTRIC BYPASS: ICD-10-CM

## 2023-06-13 DIAGNOSIS — K30 DELAYED GASTRIC EMPTYING: ICD-10-CM

## 2023-06-13 DIAGNOSIS — K86.1 CHRONIC PANCREATITIS, UNSPECIFIED PANCREATITIS TYPE (HCC): ICD-10-CM

## 2023-06-13 DIAGNOSIS — K31.1 PARTIAL GASTRIC OUTLET OBSTRUCTION: ICD-10-CM

## 2023-06-13 PROCEDURE — 99024 POSTOP FOLLOW-UP VISIT: CPT | Performed by: SURGERY

## 2023-06-13 PROCEDURE — 3074F SYST BP LT 130 MM HG: CPT | Performed by: SURGERY

## 2023-06-13 PROCEDURE — 3078F DIAST BP <80 MM HG: CPT | Performed by: SURGERY

## 2023-06-13 ASSESSMENT — FIBROSIS 4 INDEX: FIB4 SCORE: 1.24

## 2023-06-14 PROBLEM — Z98.84 HISTORY OF ROUX-EN-Y GASTRIC BYPASS: Status: ACTIVE | Noted: 2023-06-14

## 2023-06-14 ASSESSMENT — ENCOUNTER SYMPTOMS: NAUSEA: 1

## 2023-06-30 ENCOUNTER — TELEPHONE (OUTPATIENT)
Dept: SURGICAL ONCOLOGY | Facility: MEDICAL CENTER | Age: 68
End: 2023-06-30
Payer: MEDICARE

## 2023-06-30 NOTE — TELEPHONE ENCOUNTER
Pt called in stating that the last few weeks she has been experiencing stomach pains and diarrhea. She mentioned she has not been able to keep food in and has dropped weight again- she said she currently weighs 59LBS.     Kevyn was in office so call was transferred to him to consult her on what she should do.

## 2023-06-30 NOTE — TELEPHONE ENCOUNTER
Received call from patient regarding concern she has been struggling to eat for last 2 weeks. She reports trying to eat a varied diet (muffins, coffee, toast, pb, pasta, sauce, salad, ice cream, cookies). However, states she regularly gets crampy abdominal pain, gas, which later turns into painful diarrhea. She states she has lost weight. She says she does not drink much fluids besides coffee, and has not been taking nutritional supplements such as Boost lately. She reports feeling week and fatigued. She denies fever or chills.    Patient STRONGLY instructed to go to medical clinic Urgent Care or ED TODAY to get assessed, get set of basic labs, and likely IV fluids. Patient also instructed to focus PO intake on high energy and high protein sources, minimize salads and simple breads and similar. Patient encouraged to restart taking supplements such as Boost. Patient instructed to take more fluids, such as Gatorade or similar, and record fluid intake on daily level.    Patient encouraged to follow up with our clinic if any further questions or concerns.    Dr Rose updated on patient condition, who concurred with next steps.       Social Development  Five and six-year-old children will be spending more time with friends and peers and away from their families. It is important to know the friends and families that your child is spending time with.     Peers have a lot of influence on each other, and your child may be interacting with friends who do not have to follow the same rules that you have set for your child. Some rules may change as they get older, but being very clear and very consistent continues to be critical component of effective parenting. Children will frequently push the limits at this age to see what they can get away with--so it is important to regularly remind your child of appropriate rules and expectations that you have for them.     Help your child feel good about themselves by giving them praise for their accomplishments, doing things together, and listening to them without interrupting. Give them opportunities to gradually be more independent and responsible.    Continue to set a good example for them - be responsible in your own obligations, mean what you say, model appropriate behavior when you yourself are angry or upset, and show respect for others by being on time. When your child is angry or frustrated, talk to them about why they are feeling that way, what their options are and how to resolve conflicts appropriately. Physical aggression - hitting, biting, kicking, throwing things- should not be tolerated at this age; teach your children healthier ways to respond to upsetting situations and blow off steam.    Households with working parents and children school are busy! Try to devote mealtimes, bedtimes, and even driving time, to talk with your children-- keep phones and other electronic media turned off and focus on talking to each other.     Remind your children that they can tell you anything. It is especially important that they know to report you if they feel they are being teased or bullied. They should  also be taught to report bullying that they witness to you or to a teacher. Any concerns about bullying should be addressed-talk to your teachers, administrators or guidance counselors at the school to help with this issue. Good online resources regarding bullying are Videodeclasse.com.gov and the American Academy of Pediatrics \"HealthyChildren.org\" website (search for \"Bullying\").     Dental  Your child should be brushing at least twice daily for 2 minutes at a time with a pea-sized amount of regular (fluoridated) toothpaste. Children this age can also be taught to floss their teeth, but they still need a parent’s help to make sure all their back teeth are brushed well. Look for a dentist if you do not already have one. Limiting candy, other sweets, juice and sticky/chewy foods is good for their dental health.     Nutrition  Your child will be making more of their own choices about what to eat, so keep plenty of nutritious foods in your home for meal and snack times. Make sure your child eats something healthy for breakfast every morning; this is proven to positively impact school performance and learning. Your child needs ~3 servings of good sources of calcium everyday; this can include lowfat (or skim) milk, yogurt, low fat cheese or foods which have been fortified with calcium. Children this age should get 600 IU of vitamin D daily which (along with appropriate calcium intake) ensures good bone health. Most people cannot meet their vitamin D needs with their usual diet, so a multivitamin with iron supplement continues to be appropriate for this age. (A pure vitamin D supplement with 400 or 600 IU is also okay.)    Overweight and obesity are very serious problems; teaching your growing child to make healthy choices is important, as is continuing to avoid unhealthy choices like greasy fast food, bagged snacks, sodas and sweet drinks, lots of juice, candies and sweets. Make unhealthy choices an occasional treat only;  don’t keep them in your home, because your child will find them!    Physical Activity and Screen Time   A child who enjoys being physically active when they are young will be more likely to stay active as they grow older. Set a goal of 60 minutes of physical activity every day--it can be all at once or broken up into shorter segments. Try to make it a family activity as much as possible.     Time watching television, playing on the computer or using any other form of electronic media is NOT physical activity. As your child learns to read and their fine motor skills improve, they are going to become very skilled at using the computer and Internet (and they are going to like it!). Setting limits and supervising media use is very important. Set a time limit for media use each day (and enforce it). Consider setting up child-specific browsers and creating a “Favorites” toolbar so your child can only get to approved websites. For suggestions on developing healthy media habits, do an internet search for “healthychildren media use plan” for recommendations from the American Academy of Pediatrics.  Also, don't allow snacking in front of the TV set-- that is another unhealthy habit that is easier to prevent than change!    And parents need to be a role model--if you are constantly on your phone in situations where you could be talking with or interacting with your child, you are teaching them that the phone takes priority over your interaction with them.        Safety  Car:  Until a child weighs at least 40 pounds, they are safest in a rear or forward-facing car seat with a 5-point harness. If your 5-point harness seat has a higher weight limit than 40 pounds, keep your child in it-- they are safest in these seats until they outgrow the 's recommended size limits. (There is not a specific height limit for most of these seats, but children are safe as long as the top of their ears are below the top/ back edge of  the seat and their shoulders are below the highest shoulder strap slots.) When they do outgrow the 5-point harness seat limits, they should ride in a booster seat in the backseat. High-backed booster seats should be used if there are low seat backs or no head rests in your car; backless boosters can be used if your car has high seat backs and head rests.    Street Safety: Teach your child how to be safe when standing outside waiting for a bus or walking to school. Children this age should always be supervised when crossing streets.     Biking: Children (and their parents) should wear properly fitted helmets when biking. Children this age are not safe riding in the street.    Water & Sun Safety: Swimming lessons are a good idea if your child does not know how to swim yet. Even if they can swim, constant supervision by an adult who know how to swim is a must. Remember to use sunscreen with an SPF of 15 or higher when outside and reapply after time in the water.  Your child should avoid prolonged time in the sun between 11 AM and 3 PM and wear hats, sunglasses and sun protection clothing.     Personal Safety: A parent’s safety is just as important as a child’s safety. Violence is common in many people’s lives. If you do not feel safe in your home or if a partner has ever hit, kicked, shoved or physically hurt you or your child, it is important for you to get help. Talk to your doctors or a . In [place], resources include [facility] (144-052-5630) and the Republic infant dropper bottle type.   Be sure to check which product CONCENTRATION you are using.    OLD Concentration INFANT Tylenol/Acetaminophen  Drops (80 MG/0.8 mL)    Child’s Weight: Dose:  36 - 47 pounds:   240 mg (3 droppers)  48 - 59 pounds:   320 mg (4 droppers)    NEW Concentration INFANT Tylenol/Acetaminophen  Drops (160 MG/5 mL)    Child’s Weight: Dose:  36 - 47 pounds:   240 mg (7.5 mL (1 1/2 Teaspoons))  48 - 59 pounds:   320 mg (10.0 mL (2 Teaspoons))    CHILDREN’S Tylenol/Acetaminophen  (160 MG/5 mL)    Child’s Weight: Dose:  36 - 47 pounds:   240 240 mg (7.5 mL (1 1/2 Teaspoons))  48 - 59 pounds:   320 mg (10.0 mL (2 Teaspoons))    CHILDREN’S Tylenol/Acetaminophen MELTAWAYS ( 80 MG tablets)    Child’s Weight:  Dose:  36 - 47 pounds:    240 mg (3 meltaway tablets)  48 - 59 pounds:    320 mg (4 meltaway tablets)    CHILDREN'S Ibuprofen liquid (e.g., Advil or Motrin) may be given every six hours as needed for pain or fever.    Child’s Weight:  Dose:  36 - 47 pounds:    150 MG (1 1/2 Teaspoons)  48 - 59 pounds  200 mg (2 Teaspoons)     Most Recent Immunizations   Administered Date(s) Administered   • DTaP/IPV 04/14/2021   • Measles Mumps Rubella Varicella 04/14/2021       If Melissa develops any of the following reactions within 72 hours after an immunization, notify your pediatrician by calling the pediatric phone nurse:  1.  A temperature of 105 degrees or above.  2.  More than 3 hours of continuous crying.  3.  A shrill, high-pitched cry.  4.  A pale, limp spell.  5.  A seizure or fainting spell.  In this case, you should call 911 or go immediately to the emergency room.      NEXT VISIT:  6 YEARS OF AGE    Thank you for entrusting your care to [facility].    Also, check out “Children’s Health” on the [facility] Blog for updates on timely topics regarding children’s health!

## 2023-07-06 ENCOUNTER — HOSPITAL ENCOUNTER (OUTPATIENT)
Dept: RADIOLOGY | Facility: MEDICAL CENTER | Age: 68
End: 2023-07-06
Payer: MEDICARE

## 2023-07-06 ENCOUNTER — TELEPHONE (OUTPATIENT)
Dept: SURGICAL ONCOLOGY | Facility: MEDICAL CENTER | Age: 68
End: 2023-07-06
Payer: MEDICARE

## 2023-07-06 ENCOUNTER — HOSPITAL ENCOUNTER (INPATIENT)
Facility: MEDICAL CENTER | Age: 68
LOS: 5 days | DRG: 393 | End: 2023-07-11
Attending: EMERGENCY MEDICINE | Admitting: STUDENT IN AN ORGANIZED HEALTH CARE EDUCATION/TRAINING PROGRAM
Payer: MEDICARE

## 2023-07-06 DIAGNOSIS — K91.89 COMPLICATIONS OF GASTRIC BYPASS SURGERY: ICD-10-CM

## 2023-07-06 DIAGNOSIS — R19.7 DIARRHEA, UNSPECIFIED TYPE: ICD-10-CM
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
## 2023-07-06 DIAGNOSIS — K86.89 PANCREATIC INSUFFICIENCY: ICD-10-CM

## 2023-07-06 DIAGNOSIS — E86.0 DEHYDRATION: ICD-10-CM

## 2023-07-06 DIAGNOSIS — K31.1 PARTIAL GASTRIC OUTLET OBSTRUCTION: ICD-10-CM

## 2023-07-06 PROBLEM — Z71.89 ACP (ADVANCE CARE PLANNING): Status: ACTIVE | Noted: 2023-07-06

## 2023-07-06 LAB
ALBUMIN SERPL BCP-MCNC: 4.1 G/DL (ref 3.2–4.9)
ALBUMIN/GLOB SERPL: 2.6 G/DL
ALP SERPL-CCNC: 119 U/L (ref 30–99)
ALT SERPL-CCNC: 125 U/L (ref 2–50)
ANION GAP SERPL CALC-SCNC: 11 MMOL/L (ref 7–16)
AST SERPL-CCNC: 157 U/L (ref 12–45)
BASOPHILS # BLD AUTO: 0.2 % (ref 0–1.8)
BASOPHILS # BLD: 0.01 K/UL (ref 0–0.12)
BILIRUB SERPL-MCNC: 0.6 MG/DL (ref 0.1–1.5)
BUN SERPL-MCNC: 11 MG/DL (ref 8–22)
CALCIUM ALBUM COR SERPL-MCNC: 9.3 MG/DL (ref 8.5–10.5)
CALCIUM SERPL-MCNC: 9.4 MG/DL (ref 8.5–10.5)
CHLORIDE SERPL-SCNC: 105 MMOL/L (ref 96–112)
CO2 SERPL-SCNC: 27 MMOL/L (ref 20–33)
CREAT SERPL-MCNC: 0.59 MG/DL (ref 0.5–1.4)
EKG IMPRESSION: NORMAL
EOSINOPHIL # BLD AUTO: 0.01 K/UL (ref 0–0.51)
EOSINOPHIL NFR BLD: 0.2 % (ref 0–6.9)
ERYTHROCYTE [DISTWIDTH] IN BLOOD BY AUTOMATED COUNT: 43.6 FL (ref 35.9–50)
GFR SERPLBLD CREATININE-BSD FMLA CKD-EPI: 98 ML/MIN/1.73 M 2
GLOBULIN SER CALC-MCNC: 1.6 G/DL (ref 1.9–3.5)
GLUCOSE SERPL-MCNC: 85 MG/DL (ref 65–99)
HCT VFR BLD AUTO: 31.6 % (ref 37–47)
HGB BLD-MCNC: 10.9 G/DL (ref 12–16)
IMM GRANULOCYTES # BLD AUTO: 0.01 K/UL (ref 0–0.11)
IMM GRANULOCYTES NFR BLD AUTO: 0.2 % (ref 0–0.9)
LACTATE SERPL-SCNC: 1.2 MMOL/L (ref 0.5–2)
LIPASE SERPL-CCNC: 51 U/L (ref 11–82)
LYMPHOCYTES # BLD AUTO: 1.46 K/UL (ref 1–4.8)
LYMPHOCYTES NFR BLD: 35.7 % (ref 22–41)
MCH RBC QN AUTO: 33.3 PG (ref 27–33)
MCHC RBC AUTO-ENTMCNC: 34.5 G/DL (ref 32.2–35.5)
MCV RBC AUTO: 96.6 FL (ref 81.4–97.8)
MONOCYTES # BLD AUTO: 0.24 K/UL (ref 0–0.85)
MONOCYTES NFR BLD AUTO: 5.9 % (ref 0–13.4)
NEUTROPHILS # BLD AUTO: 2.36 K/UL (ref 1.82–7.42)
NEUTROPHILS NFR BLD: 57.8 % (ref 44–72)
NRBC # BLD AUTO: 0 K/UL
NRBC BLD-RTO: 0 /100 WBC (ref 0–0.2)
PLATELET # BLD AUTO: 243 K/UL (ref 164–446)
PMV BLD AUTO: 9.8 FL (ref 9–12.9)
POTASSIUM SERPL-SCNC: 3.9 MMOL/L (ref 3.6–5.5)
PROT SERPL-MCNC: 5.7 G/DL (ref 6–8.2)
RBC # BLD AUTO: 3.27 M/UL (ref 4.2–5.4)
SODIUM SERPL-SCNC: 143 MMOL/L (ref 135–145)
WBC # BLD AUTO: 4.1 K/UL (ref 4.8–10.8)

## 2023-07-06 PROCEDURE — 36415 COLL VENOUS BLD VENIPUNCTURE: CPT

## 2023-07-06 PROCEDURE — 83605 ASSAY OF LACTIC ACID: CPT

## 2023-07-06 PROCEDURE — 85025 COMPLETE CBC W/AUTO DIFF WBC: CPT

## 2023-07-06 PROCEDURE — 700111 HCHG RX REV CODE 636 W/ 250 OVERRIDE (IP): Performed by: STUDENT IN AN ORGANIZED HEALTH CARE EDUCATION/TRAINING PROGRAM

## 2023-07-06 PROCEDURE — 99223 1ST HOSP IP/OBS HIGH 75: CPT | Mod: 25 | Performed by: STUDENT IN AN ORGANIZED HEALTH CARE EDUCATION/TRAINING PROGRAM

## 2023-07-06 PROCEDURE — 83690 ASSAY OF LIPASE: CPT

## 2023-07-06 PROCEDURE — 770006 HCHG ROOM/CARE - MED/SURG/GYN SEMI*

## 2023-07-06 PROCEDURE — 99285 EMERGENCY DEPT VISIT HI MDM: CPT

## 2023-07-06 PROCEDURE — 96374 THER/PROPH/DIAG INJ IV PUSH: CPT

## 2023-07-06 PROCEDURE — 96372 THER/PROPH/DIAG INJ SC/IM: CPT

## 2023-07-06 PROCEDURE — 80053 COMPREHEN METABOLIC PANEL: CPT

## 2023-07-06 PROCEDURE — 99497 ADVNCD CARE PLAN 30 MIN: CPT | Performed by: STUDENT IN AN ORGANIZED HEALTH CARE EDUCATION/TRAINING PROGRAM

## 2023-07-06 PROCEDURE — 99221 1ST HOSP IP/OBS SF/LOW 40: CPT | Performed by: SURGERY

## 2023-07-06 PROCEDURE — 700105 HCHG RX REV CODE 258: Performed by: STUDENT IN AN ORGANIZED HEALTH CARE EDUCATION/TRAINING PROGRAM

## 2023-07-06 PROCEDURE — 96375 TX/PRO/DX INJ NEW DRUG ADDON: CPT

## 2023-07-06 PROCEDURE — 700111 HCHG RX REV CODE 636 W/ 250 OVERRIDE (IP): Mod: JZ | Performed by: EMERGENCY MEDICINE

## 2023-07-06 PROCEDURE — 700105 HCHG RX REV CODE 258: Mod: JZ | Performed by: EMERGENCY MEDICINE

## 2023-07-06 PROCEDURE — 93005 ELECTROCARDIOGRAM TRACING: CPT | Performed by: STUDENT IN AN ORGANIZED HEALTH CARE EDUCATION/TRAINING PROGRAM

## 2023-07-06 RX ORDER — ONDANSETRON 2 MG/ML
4 INJECTION INTRAMUSCULAR; INTRAVENOUS EVERY 4 HOURS PRN
Status: DISCONTINUED | OUTPATIENT
Start: 2023-07-06 | End: 2023-07-11 | Stop reason: HOSPADM

## 2023-07-06 RX ORDER — HYDROCODONE BITARTRATE AND ACETAMINOPHEN 5; 325 MG/1; MG/1
1 TABLET ORAL EVERY 4 HOURS PRN
Status: ON HOLD | COMMUNITY
End: 2023-07-11

## 2023-07-06 RX ORDER — SODIUM CHLORIDE, SODIUM LACTATE, POTASSIUM CHLORIDE, AND CALCIUM CHLORIDE .6; .31; .03; .02 G/100ML; G/100ML; G/100ML; G/100ML
30 INJECTION, SOLUTION INTRAVENOUS ONCE
Status: COMPLETED | OUTPATIENT
Start: 2023-07-06 | End: 2023-07-06

## 2023-07-06 RX ORDER — ENOXAPARIN SODIUM 100 MG/ML
30 INJECTION SUBCUTANEOUS DAILY
Status: DISCONTINUED | OUTPATIENT
Start: 2023-07-06 | End: 2023-07-11 | Stop reason: HOSPADM

## 2023-07-06 RX ORDER — ONDANSETRON 2 MG/ML
4 INJECTION INTRAMUSCULAR; INTRAVENOUS ONCE
Status: COMPLETED | OUTPATIENT
Start: 2023-07-06 | End: 2023-07-06

## 2023-07-06 RX ORDER — MORPHINE SULFATE 4 MG/ML
4 INJECTION INTRAVENOUS ONCE
Status: COMPLETED | OUTPATIENT
Start: 2023-07-06 | End: 2023-07-06

## 2023-07-06 RX ORDER — ONDANSETRON 4 MG/1
4 TABLET, ORALLY DISINTEGRATING ORAL EVERY 4 HOURS PRN
Status: DISCONTINUED | OUTPATIENT
Start: 2023-07-06 | End: 2023-07-11 | Stop reason: HOSPADM

## 2023-07-06 RX ORDER — SODIUM CHLORIDE 9 MG/ML
INJECTION, SOLUTION INTRAVENOUS CONTINUOUS
Status: DISCONTINUED | OUTPATIENT
Start: 2023-07-06 | End: 2023-07-07

## 2023-07-06 RX ORDER — ENOXAPARIN SODIUM 100 MG/ML
40 INJECTION SUBCUTANEOUS DAILY
Status: DISCONTINUED | OUTPATIENT
Start: 2023-07-06 | End: 2023-07-06

## 2023-07-06 RX ADMIN — MORPHINE SULFATE 4 MG: 4 INJECTION, SOLUTION INTRAMUSCULAR; INTRAVENOUS at 17:00

## 2023-07-06 RX ADMIN — ONDANSETRON 4 MG: 2 INJECTION INTRAMUSCULAR; INTRAVENOUS at 17:01

## 2023-07-06 RX ADMIN — SODIUM CHLORIDE: 9 INJECTION, SOLUTION INTRAVENOUS at 19:55

## 2023-07-06 RX ADMIN — ENOXAPARIN SODIUM 30 MG: 100 INJECTION SUBCUTANEOUS at 19:55

## 2023-07-06 RX ADMIN — SODIUM CHLORIDE, POTASSIUM CHLORIDE, SODIUM LACTATE AND CALCIUM CHLORIDE 843 ML: 600; 310; 30; 20 INJECTION, SOLUTION INTRAVENOUS at 16:57

## 2023-07-06 ASSESSMENT — ENCOUNTER SYMPTOMS
HEADACHES: 0
HALLUCINATIONS: 0
DIARRHEA: 1
BLURRED VISION: 0
BRUISES/BLEEDS EASILY: 0
COUGH: 0
PALPITATIONS: 0
VOMITING: 1
MYALGIAS: 0
FOCAL WEAKNESS: 0
EYE DISCHARGE: 0
DOUBLE VISION: 0
NERVOUS/ANXIOUS: 0
CLAUDICATION: 0
TINGLING: 0
DEPRESSION: 0
MUSCULOSKELETAL NEGATIVE: 1
EYES NEGATIVE: 1
DIZZINESS: 0
TREMORS: 0
CONSTIPATION: 0
VOMITING: 0
RESPIRATORY NEGATIVE: 1
NECK PAIN: 0
ORTHOPNEA: 0
CARDIOVASCULAR NEGATIVE: 1
PSYCHIATRIC NEGATIVE: 1
WEIGHT LOSS: 1
HEARTBURN: 0
NEUROLOGICAL NEGATIVE: 1
SENSORY CHANGE: 0
PHOTOPHOBIA: 0
ABDOMINAL PAIN: 1
BACK PAIN: 0
NAUSEA: 1
SPEECH CHANGE: 0
HEMOPTYSIS: 0
FEVER: 0
WEAKNESS: 1
EYE PAIN: 0
SPUTUM PRODUCTION: 0
CHILLS: 0

## 2023-07-06 ASSESSMENT — LIFESTYLE VARIABLES: SUBSTANCE_ABUSE: 0

## 2023-07-06 ASSESSMENT — FIBROSIS 4 INDEX: FIB4 SCORE: 1.24

## 2023-07-06 ASSESSMENT — PAIN DESCRIPTION - PAIN TYPE: TYPE: ACUTE PAIN

## 2023-07-06 NOTE — TELEPHONE ENCOUNTER
1323 07/06/23  Pt called in and identity was verified. Pt stated that she had a scan done at [facility] today and was informed by her physician there to present to the Emergency Room at [facility] in [place] because it looked like there was a perforation. The pt informed that she will be at the ER in 2 hours as that is how long it takes to get to [place] from [place]. Kevyn BARRETT was notified of this and [facility] was called to have the images pushed into the [facility] as well as the imaging report.  Kaila MARCH

## 2023-07-06 NOTE — ED PROVIDER NOTES
ER Provider Note    Scribed for Marline Russell M.d. by Sammy Stephens. 7/6/2023  4:17 PM    Primary Care Provider: Pcp Not In Computer    CHIEF COMPLAINT  Chief Complaint   Patient presents with    Abdominal Pain    Diarrhea     Pt sent by MD. Pt found to have a bowel perforation by CT. Pt states she has been having abd pain/diarrhea/nausea. Pt states she recently had Abd surgery in May.     Hypotension     EXTERNAL RECORDS REVIEWED  Other patient's CT scan from yesterday indicates diffuse bags of free air and free fluid in the lower pelvis.     HPI/ROS  LIMITATION TO HISTORY   Select: : None  OUTSIDE HISTORIAN(S):  Significant other patient's  is at bedside and provided history pertinent to recent medical history and severity of her symptoms.     Kathy Dumont is a 67 y.o. female who presents to the ED for evaluation of constant abdominal pain onset two weeks ago. Patient reports that she had a gastric bypass surgery performed by Dr. Ku on May 5, 2023, and after a CT scan this morning she was informed that she has a small bowel perforation. After this finding, she contacted Dr. Ku, but was advised to report to the ED today immediately. Kathy reports that this operation was performed for her gastroparesis, and she had a portion of the small intestine relocated during the procedure. Following her surgery, she was recovering well for a few weeks, but has been experiencing worsening abdominal pain with associated diarrhea, weight loss, decreased PO intake, and chills for the past two weeks. Patient states that her diarrhea has been daily with every bowel movement, and she weighed 64 pounds at the time of her surgery, but now weighs 59 pounds. Her abdominal pain is exacerbated by eating food, and she has attempted to eat soft foods such as jello and mashed potatoes, but has still not had much help. She denies any fevers. Kathy's blood pressure is normally in the 80's, but she  states that the 70's are low for her and she has noticed it at this level recently. Patient has gone in for her Post-operation appointment, and discussed her current symptoms, which she believed to be due to pancreatitis, and her family doctor informed her to stay home and relax. Kathy sent a stool sample in yesterday for evaluation. Patient reports additional history of two ERCP's.     PAST MEDICAL HISTORY  Past Medical History:   Diagnosis Date    Acute recurrent pancreatitis 08/2021    Anesthesia     PONV    Arthritis 08/2021    fingers    Celiac disease     Disorder of thyroid     hypothyroid    Gastroparesis 08/2021    Gluten intolerance        SURGICAL HISTORY  Past Surgical History:   Procedure Laterality Date    GASTROJEJUNOSTOMY N/A 5/5/2023    Procedure: MAIK-EN-Y GASTROJEJUNOSTOMY BYPASS AND EXPLORATORY LAPAORTOMY;  Surgeon: Bradly Cisneros M.D.;  Location: [facility];  Service: General    DC UPPER GI ENDOSCOPY,DIAGNOSIS  4/18/2022    Procedure: GASTROSCOPY - WITH PYLORUS DILATION WITH BOTOX INJECTION;  Surgeon: Rivas Cadet M.D.;  Location: [facility];  Service: Gastroenterology    DC ERCP,DIAGNOSTIC  4/18/2022    Procedure: ERCP (ENDOSCOPIC RETROGRADE CHOLANGIOPANCREATOGRAPHY);  Surgeon: Rivas Cadet M.D.;  Location: [facility];  Service: Gastroenterology    DC UPPER GI ENDOSCOPY,DIAGNOSIS N/A 8/9/2021    Procedure: GASTROSCOPY;  Surgeon: Rivas Cadet M.D.;  Location: [facility];  Service: EUS    DC ERCP,DIAGNOSTIC N/A 8/9/2021    Procedure: ERCP, DIAGNOSTIC;  Surgeon: Rivas Cadet M.D.;  Location: [facility];  Service: EUS    EGD W/ENDOSCOPIC ULTRASOUND N/A 8/9/2021    Procedure: EGD, WITH ENDOSCOPIC US - UPPER RADIAL;  Surgeon: Rivas Cadet M.D.;  Location: [facility];  Service: EUS    ORIF, HUMERUS Left 2006    KNEE ARTHROSCOPY Left 1973    CHOLECYSTECTOMY  1963    APPENDECTOMY CHILD  "     PRIMARY C SECTION  1984, 1987, 1990       FAMILY HISTORY  No family history on file.    SOCIAL HISTORY   reports that she has never smoked. She has never used smokeless tobacco. She reports current drug use. Drug: Oral. She reports that she does not drink alcohol.    CURRENT MEDICATIONS  Previous Medications    CALCIUM PO    Take 1 Tablet by mouth every day.    HYDROCODONE-ACETAMINOPHEN (NORCO) 5-325 MG TAB PER TABLET    Take 1 Tablet by mouth every four hours as needed (PAIN).    LEVOTHYROXINE (SYNTHROID) 50 MCG TAB    Take 50 mcg by mouth every morning on an empty stomach.    MULTIVITAMIN TAB    Take 1 Tablet by mouth every day.    ONDANSETRON (ZOFRAN) 4 MG TAB TABLET    Take 4 mg by mouth every 8 hours as needed for Nausea/Vomiting.       ALLERGIES  Hydrocodone-acetaminophen, Loratadine-pseudoephedrine, Sertraline hcl, Tramadol, Zoloft, Codeine, and Gluten meal    PHYSICAL EXAM  BP (!) 79/55   Pulse 86   Temp 36.4 °C (97.6 °F) (Temporal)   Resp 16   Ht 1.6 m (5' 3\")   Wt 28.1 kg (62 lb)   SpO2 97%   BMI 10.98 kg/m²   Constitutional: Alert in no apparent distress. Frail appearing but non toxic.   HENT: No signs of trauma, Bilateral external ears normal, Nose normal.   Eyes: Pupils are equal and reactive, Conjunctiva normal, Non-icteric.   Neck: No stridor.   Cardiovascular: Regular rate and rhythm, no murmurs.   Thorax & Lungs: Normal breath sounds, No respiratory distress, No wheezing, No guarding, No chest tenderness.   Abdomen: Bowel sounds normal, Soft, Upper abdominal tenderness, No masses, No peritoneal signs.  Skin: Warm, Dry, No erythema, No rash.   Musculoskeletal:  No major deformities noted.   Neurologic: Alert, moving all extremities without difficulty, no focal deficits.     DIAGNOSTIC STUDIES    Labs:   Results for orders placed or performed during the hospital encounter of 07/06/23   CBC with Differential   Result Value Ref Range    WBC 4.1 (L) 4.8 - 10.8 K/uL    RBC 3.27 (L) 4.20 - 5.40 " M/uL    Hemoglobin 10.9 (L) 12.0 - 16.0 g/dL    Hematocrit 31.6 (L) 37.0 - 47.0 %    MCV 96.6 81.4 - 97.8 fL    MCH 33.3 (H) 27.0 - 33.0 pg    MCHC 34.5 32.2 - 35.5 g/dL    RDW 43.6 35.9 - 50.0 fL    Platelet Count 243 164 - 446 K/uL    MPV 9.8 9.0 - 12.9 fL    Neutrophils-Polys 57.80 44.00 - 72.00 %    Lymphocytes 35.70 22.00 - 41.00 %    Monocytes 5.90 0.00 - 13.40 %    Eosinophils 0.20 0.00 - 6.90 %    Basophils 0.20 0.00 - 1.80 %    Immature Granulocytes 0.20 0.00 - 0.90 %    Nucleated RBC 0.00 0.00 - 0.20 /100 WBC    Neutrophils (Absolute) 2.36 1.82 - 7.42 K/uL    Lymphs (Absolute) 1.46 1.00 - 4.80 K/uL    Monos (Absolute) 0.24 0.00 - 0.85 K/uL    Eos (Absolute) 0.01 0.00 - 0.51 K/uL    Baso (Absolute) 0.01 0.00 - 0.12 K/uL    Immature Granulocytes (abs) 0.01 0.00 - 0.11 K/uL    NRBC (Absolute) 0.00 K/uL   Complete Metabolic Panel   Result Value Ref Range    Sodium 143 135 - 145 mmol/L    Potassium 3.9 3.6 - 5.5 mmol/L    Chloride 105 96 - 112 mmol/L    Co2 27 20 - 33 mmol/L    Anion Gap 11.0 7.0 - 16.0    Glucose 85 65 - 99 mg/dL    Bun 11 8 - 22 mg/dL    Creatinine 0.59 0.50 - 1.40 mg/dL    Calcium 9.4 8.5 - 10.5 mg/dL    AST(SGOT) 157 (H) 12 - 45 U/L    ALT(SGPT) 125 (H) 2 - 50 U/L    Alkaline Phosphatase 119 (H) 30 - 99 U/L    Total Bilirubin 0.6 0.1 - 1.5 mg/dL    Albumin 4.1 3.2 - 4.9 g/dL    Total Protein 5.7 (L) 6.0 - 8.2 g/dL    Globulin 1.6 (L) 1.9 - 3.5 g/dL    A-G Ratio 2.6 g/dL   Lipase   Result Value Ref Range    Lipase 51 11 - 82 U/L   Urinalysis    Specimen: Urine   Result Value Ref Range    Color Yellow     Character Clear     Specific Gravity >=1.045 (A) <1.035    Ph 7.5 5.0 - 8.0    Glucose Negative Negative mg/dL    Ketones Negative Negative mg/dL    Protein Negative Negative mg/dL    Bilirubin Negative Negative    Urobilinogen, Urine 1.0 Negative    Nitrite Negative Negative    Leukocyte Esterase Negative Negative    Occult Blood Negative Negative    Micro Urine Req see below    Lactic Acid    Result Value Ref Range    Lactic Acid 1.2 0.5 - 2.0 mmol/L   CORRECTED CALCIUM   Result Value Ref Range    Correct Calcium 9.3 8.5 - 10.5 mg/dL   ESTIMATED GFR   Result Value Ref Range    GFR (CKD-EPI) 98 >60 mL/min/1.73 m 2   EKG   Result Value Ref Range    Report       [facility] Emergency Dept.    Test Date:  2023  Pt Name:    LUIS MATIAS         Department: ER  MRN:        [id number]                      Room:        08  Gender:     Female                       Technician: [id number]  :        [id number]                   Requested By:ASHLI HERNÁNDEZ  Order #:    [id number]                    Reading MD:    Measurements  Intervals                                Axis  Rate:       60                           P:          90  NJ:         154                          QRS:        84  QRSD:       65                           T:          77  QT:         424  QTc:        424    Interpretive Statements  Sinus rhythm  Consider left atrial enlargement  Borderline right axis deviation  Nonspecific T abnrm, anterolateral leads  Compared to ECG 2023 06:13:55  Intraventricular conduction delay no longer present           COURSE & MEDICAL DECISION MAKING     4:41 PM - Patient seen and examined at bedside. Discussed plan of care, including evaluation with lab work and contacting surgery to discuss the plan of care. Patient agrees to the plan of care. The patient will be medicated with Zofran 4 mg, Morphine 4 mg, and lacerated ringers infusion. Ordered for UA, Lipase, CMP, CBC with differential, and Lactic acid to evaluate her symptoms.      ED Observation Status? No; Patient does not meet criteria for ED Observation.     4:39 PM - I reviewed patient's CT scan from yesterday which indicates diffuse bags of free air and free fluid in the lower pelvis.    4:55 PM - I discussed the patient's case and the above findings with Dr. Rose (Surgery) who has reviewed the images and was not  convinced that this was an acute bowel perforation, but agrees with the plan of lab work and fluids. Patient likely needs hospitalization.     6:03 PM - Dr. Rose is present at bedside at this time, and discussed the case with the patient. He informed me that he believes the patient should be admitted for dehydration and diarrhea, but perhaps not on the surgical service.     6:33 PM - I discussed the patient's case and the above findings with Dr. Vargas (hospitalist) who agrees with the plan for hospitalization.     INITIAL ASSESSMENT, COURSE AND PLAN  Care Narrative: This is a 67-year-old female who presents for continued upper abdominal pain after recent Sinai-en-Y surgery.  She is quite frail appearing and malnourished at baseline.  CT scan from the outside facility showed concern for possible bowel perforation.  However on further review of the CT scan there are few foci of free air that may represent pneumobilia without evidence of acute perforation.  She is having significant amount of diarrhea I do think she is quite dehydrated she also has a need for significant nutrition support.  Dr. Lobo saw the patient and recommends that she be hospitalized for hydration and they will follow along.  Patient will be hospitalized for this.  Labs do not show any evidence of significant infection she is afebrile she is not tachycardic.  She has mild elevation of her LFTs.  Lactate is normal.    HYDRATION: Based on the patient's presentation of Dehydration the patient was given IV fluids. IV Hydration was used because oral hydration was not adequate alone. Upon recheck following hydration, the patient was improved.      ADDITIONAL PROBLEM LIST  Dehydration  Malnutrition  Diarrhea      DISPOSITION AND DISCUSSIONS  I have discussed management of the patient with the following physicians and SAULO's:  Dr.. Rose (surgery) and Dr. Vargas (hospitalist).    Discussion of management with other [place] [facility] or appropriate source(s): None        DISPOSITION:  Patient will be hospitalized by Dr. Vargas in guarded condition.     FINAL DIANGOSIS  1. Dehydration    2. Diarrhea, unspecified type           The note accurately reflects work and decisions made by me.  Marline Russell M.D.  7/7/2023  12:58 AM     Sammy NEGRO (Scribe), am scribing for, and in the presence of, Marline Russell M.D..    Electronically signed by: Sammy Stephens ([email or web address]), 7/6/2023    Marline NEGRO M.D. personally performed the services described in this documentation, as scribed by Sammy Stephens in my presence, and it is both accurate and complete.

## 2023-07-06 NOTE — ED TRIAGE NOTES
Chief Complaint   Patient presents with    Abdominal Pain    Diarrhea     Pt sent by MD. Pt found to have a bowel perforation by CT. Pt states she has been having abd pain/diarrhea/nausea. Pt states she recently had Abd surgery in May.     Hypotension   Charge RN notified of pt's BP. Pt reports that her BP is usually low and has at times been in the 80's systolic.   Explained to pt triage process, made pt aware to tell this RN/staff of any changes/concerns, pt verbalized understanding of process and instructions given. Pt to ER awa.

## 2023-07-07 ENCOUNTER — APPOINTMENT (OUTPATIENT)
Dept: RADIOLOGY | Facility: MEDICAL CENTER | Age: 68
DRG: 393 | End: 2023-07-07
Attending: STUDENT IN AN ORGANIZED HEALTH CARE EDUCATION/TRAINING PROGRAM
Payer: MEDICARE

## 2023-07-07 DIAGNOSIS — K30 DELAYED GASTRIC EMPTYING: ICD-10-CM

## 2023-07-07 DIAGNOSIS — F50.00 ANOREXIA NERVOSA: ICD-10-CM

## 2023-07-07 DIAGNOSIS — Z98.84 HISTORY OF ROUX-EN-Y GASTRIC BYPASS: ICD-10-CM

## 2023-07-07 DIAGNOSIS — R63.6 UNDERWEIGHT DUE TO INADEQUATE CALORIC INTAKE: ICD-10-CM

## 2023-07-07 DIAGNOSIS — K31.84 GASTROPARESIS: ICD-10-CM

## 2023-07-07 DIAGNOSIS — K31.1 PARTIAL GASTRIC OUTLET OBSTRUCTION: ICD-10-CM

## 2023-07-07 PROBLEM — K86.89 PANCREATIC INSUFFICIENCY: Status: ACTIVE | Noted: 2023-07-07

## 2023-07-07 LAB
APPEARANCE UR: CLEAR
BILIRUB UR QL STRIP.AUTO: NEGATIVE
BLOOD CULTURE HOLD CXBCH: NORMAL
C DIFF DNA SPEC QL NAA+PROBE: NEGATIVE
C DIFF TOX A+B STL QL IA: NEGATIVE
C DIFF TOX GENS STL QL NAA+PROBE: NORMAL
COLOR UR: YELLOW
GLUCOSE UR STRIP.AUTO-MCNC: NEGATIVE MG/DL
KETONES UR STRIP.AUTO-MCNC: NEGATIVE MG/DL
LEUKOCYTE ESTERASE UR QL STRIP.AUTO: NEGATIVE
MICRO URNS: ABNORMAL
NITRITE UR QL STRIP.AUTO: NEGATIVE
PH UR STRIP.AUTO: 7.5 [PH] (ref 5–8)
PROT UR QL STRIP: NEGATIVE MG/DL
RBC UR QL AUTO: NEGATIVE
SP GR UR STRIP.AUTO: >=1.045
UROBILINOGEN UR STRIP.AUTO-MCNC: 1 MG/DL

## 2023-07-07 PROCEDURE — 700101 HCHG RX REV CODE 250: Performed by: STUDENT IN AN ORGANIZED HEALTH CARE EDUCATION/TRAINING PROGRAM

## 2023-07-07 PROCEDURE — 87493 C DIFF AMPLIFIED PROBE: CPT

## 2023-07-07 PROCEDURE — 700102 HCHG RX REV CODE 250 W/ 637 OVERRIDE(OP): Performed by: STUDENT IN AN ORGANIZED HEALTH CARE EDUCATION/TRAINING PROGRAM

## 2023-07-07 PROCEDURE — 99232 SBSQ HOSP IP/OBS MODERATE 35: CPT | Performed by: STUDENT IN AN ORGANIZED HEALTH CARE EDUCATION/TRAINING PROGRAM

## 2023-07-07 PROCEDURE — 700111 HCHG RX REV CODE 636 W/ 250 OVERRIDE (IP): Mod: JZ | Performed by: STUDENT IN AN ORGANIZED HEALTH CARE EDUCATION/TRAINING PROGRAM

## 2023-07-07 PROCEDURE — 87899 AGENT NOS ASSAY W/OPTIC: CPT | Mod: 91

## 2023-07-07 PROCEDURE — 81003 URINALYSIS AUTO W/O SCOPE: CPT

## 2023-07-07 PROCEDURE — 99232 SBSQ HOSP IP/OBS MODERATE 35: CPT | Performed by: NURSE PRACTITIONER

## 2023-07-07 PROCEDURE — A9270 NON-COVERED ITEM OR SERVICE: HCPCS | Performed by: STUDENT IN AN ORGANIZED HEALTH CARE EDUCATION/TRAINING PROGRAM

## 2023-07-07 PROCEDURE — 87324 CLOSTRIDIUM AG IA: CPT

## 2023-07-07 PROCEDURE — 87045 FECES CULTURE AEROBIC BACT: CPT

## 2023-07-07 PROCEDURE — 770006 HCHG ROOM/CARE - MED/SURG/GYN SEMI*

## 2023-07-07 PROCEDURE — 700105 HCHG RX REV CODE 258: Performed by: STUDENT IN AN ORGANIZED HEALTH CARE EDUCATION/TRAINING PROGRAM

## 2023-07-07 RX ORDER — DEXTROSE MONOHYDRATE, SODIUM CHLORIDE, AND POTASSIUM CHLORIDE 50; 1.49; 9 G/1000ML; G/1000ML; G/1000ML
INJECTION, SOLUTION INTRAVENOUS CONTINUOUS
Status: DISPENSED | OUTPATIENT
Start: 2023-07-07 | End: 2023-07-10

## 2023-07-07 RX ORDER — LOPERAMIDE HYDROCHLORIDE 2 MG/1
2 CAPSULE ORAL 4 TIMES DAILY PRN
Status: DISCONTINUED | OUTPATIENT
Start: 2023-07-07 | End: 2023-07-11 | Stop reason: HOSPADM

## 2023-07-07 RX ORDER — LEVOTHYROXINE SODIUM 0.05 MG/1
50 TABLET ORAL
Status: DISCONTINUED | OUTPATIENT
Start: 2023-07-07 | End: 2023-07-11 | Stop reason: HOSPADM

## 2023-07-07 RX ORDER — OXYCODONE HYDROCHLORIDE 5 MG/1
5 TABLET ORAL EVERY 4 HOURS PRN
Status: DISCONTINUED | OUTPATIENT
Start: 2023-07-07 | End: 2023-07-11 | Stop reason: HOSPADM

## 2023-07-07 RX ADMIN — LEVOTHYROXINE SODIUM 50 MCG: 0.05 TABLET ORAL at 08:02

## 2023-07-07 RX ADMIN — POTASSIUM CHLORIDE, DEXTROSE MONOHYDRATE AND SODIUM CHLORIDE: 150; 5; 900 INJECTION, SOLUTION INTRAVENOUS at 15:04

## 2023-07-07 RX ADMIN — PANCRELIPASE 12000 UNITS: 30000; 6000; 19000 CAPSULE, DELAYED RELEASE PELLETS ORAL at 20:47

## 2023-07-07 RX ADMIN — ENOXAPARIN SODIUM 30 MG: 100 INJECTION SUBCUTANEOUS at 17:19

## 2023-07-07 RX ADMIN — OXYCODONE HYDROCHLORIDE 5 MG: 5 TABLET ORAL at 17:20

## 2023-07-07 RX ADMIN — SODIUM CHLORIDE: 9 INJECTION, SOLUTION INTRAVENOUS at 04:41

## 2023-07-07 RX ADMIN — ONDANSETRON 4 MG: 2 INJECTION INTRAMUSCULAR; INTRAVENOUS at 04:31

## 2023-07-07 RX ADMIN — THERA TABS 1 TABLET: TAB at 08:02

## 2023-07-07 RX ADMIN — ACETAMINOPHEN, ASPIRIN, CAFFEINE 1 TABLET: 250; 65; 250 TABLET, FILM COATED ORAL at 17:21

## 2023-07-07 ASSESSMENT — COGNITIVE AND FUNCTIONAL STATUS - GENERAL
CLIMB 3 TO 5 STEPS WITH RAILING: A LOT
DAILY ACTIVITIY SCORE: 24
SUGGESTED CMS G CODE MODIFIER DAILY ACTIVITY: CH
MOBILITY SCORE: 20
STANDING UP FROM CHAIR USING ARMS: A LITTLE
SUGGESTED CMS G CODE MODIFIER MOBILITY: CJ
WALKING IN HOSPITAL ROOM: A LITTLE

## 2023-07-07 ASSESSMENT — ENCOUNTER SYMPTOMS
FOCAL WEAKNESS: 0
BACK PAIN: 0
DIARRHEA: 1
EYE PAIN: 0
FEVER: 0
CHILLS: 0
BLURRED VISION: 0
PALPITATIONS: 0
NAUSEA: 1
SENSORY CHANGE: 0
ABDOMINAL PAIN: 1
SHORTNESS OF BREATH: 0
DIZZINESS: 0
BLOOD IN STOOL: 0
HEADACHES: 0
COUGH: 0
FALLS: 0
VOMITING: 1
INSOMNIA: 0
WEIGHT LOSS: 1
VOMITING: 0

## 2023-07-07 ASSESSMENT — LIFESTYLE VARIABLES
SUBSTANCE_ABUSE: 0
EVER FELT BAD OR GUILTY ABOUT YOUR DRINKING: NO
TOTAL SCORE: 0
AVERAGE NUMBER OF DAYS PER WEEK YOU HAVE A DRINK CONTAINING ALCOHOL: 0
ON A TYPICAL DAY WHEN YOU DRINK ALCOHOL HOW MANY DRINKS DO YOU HAVE: 0
HOW MANY TIMES IN THE PAST YEAR HAVE YOU HAD 5 OR MORE DRINKS IN A DAY: 0
TOTAL SCORE: 0
DOES PATIENT WANT TO STOP DRINKING: NO
EVER HAD A DRINK FIRST THING IN THE MORNING TO STEADY YOUR NERVES TO GET RID OF A HANGOVER: NO
CONSUMPTION TOTAL: NEGATIVE
HAVE PEOPLE ANNOYED YOU BY CRITICIZING YOUR DRINKING: NO
HAVE YOU EVER FELT YOU SHOULD CUT DOWN ON YOUR DRINKING: NO
TOTAL SCORE: 0
ALCOHOL_USE: NO

## 2023-07-07 ASSESSMENT — PATIENT HEALTH QUESTIONNAIRE - PHQ9
1. LITTLE INTEREST OR PLEASURE IN DOING THINGS: NOT AT ALL
SUM OF ALL RESPONSES TO PHQ9 QUESTIONS 1 AND 2: 0
1. LITTLE INTEREST OR PLEASURE IN DOING THINGS: NOT AT ALL
SUM OF ALL RESPONSES TO PHQ9 QUESTIONS 1 AND 2: 0
2. FEELING DOWN, DEPRESSED, IRRITABLE, OR HOPELESS: NOT AT ALL
2. FEELING DOWN, DEPRESSED, IRRITABLE, OR HOPELESS: NOT AT ALL

## 2023-07-07 ASSESSMENT — FIBROSIS 4 INDEX: FIB4 SCORE: 3.87

## 2023-07-07 ASSESSMENT — PAIN DESCRIPTION - PAIN TYPE
TYPE: ACUTE PAIN

## 2023-07-07 NOTE — PROGRESS NOTES
Assumed care of patient from NOC shift RN with day shift RN. Patient resting comfortably in bed, IV fluids currently infusing per order. Call light within reach, bed in lowest position. Patient c/o having diarrhea, order to collect stool to rule out C. Diff.     0930: Collected patients stool and sent to lab for testing. All needs met at this time.

## 2023-07-07 NOTE — DIETARY
"Nutrition services: Day 1 of admit.  Kathy Dumont is a 67 y.o. female with admitting DX of complications of gastric bypass surgery - now with bowel perforation s/p Sinai-y gastrojejunostomy 5/5/23.  Per MD, also possibility of dumping syndrome or exocrine pancreatic insufficiency.  Consult received for poor PO, failure to thrive and now with plan to start TPN.      I met with patient this morning at bedside.  Upon visual observation she is severely cachetic.  She reports that her weight has been stable ~ 65-68 pounds.  She reports prior to her bypass in May, she really struggled to eat related to her gastroparesis and ongoing pain with eating. Since her bypass however the pain has gotten better, except recently, and she was able to eat slightly better. She reports more of a desire to eat at this time but that she has been eating very small meals and not a lot of protein.  Discussed dumping syndrome and benefits of limiting simple sugars.  Reviewed balanced meals and importance of incorporating protein and slowly reintroducing fiber.  Patient endorses ongoing diarrhea at this time with some nausea though no vomiting. C.diff pending.       Assessment:  Height: 160 cm (5' 3\")  Weight: 28.1 kg (62 lb)  Body mass index is 10.98 kg/m²., BMI classification: underweight  Diet/Intake: NPO    Wt Readings from Last 20 Encounters:   07/06/23 28.1 kg (62 lb)   06/13/23 28.1 kg (62 lb)   05/24/23 27.7 kg (61 lb)   05/14/23 28.1 kg (62 lb)   05/05/23 28 kg (61 lb 11.7 oz)   05/02/23 28.7 kg (63 lb 4.8 oz)   04/18/22 29.3 kg (64 lb 9.5 oz)   08/09/21 29.5 kg (65 lb 0.6 oz)   08/02/21 29.9 kg (66 lb)        Estimated Nutrition Needs:   IBW = 52 kg   Actual body weight: MSJ x 1.5 = 1177 kcal/day   Ideal Body weight MSJ x 1.2 = 1230 kcal/day   Protein: 1.5 - 2.0 g/kg ABW = 42 - 56 grams of protein; 1.0 - 1.2 g/kg IBW = 52 - 62 grams/day  1150 - 1250 kcal/day  50 - 60 grams of protein/day        Evaluation:   Labs " reviewed - Sodium and potassium WNL  History includes: acute recurrent pancreatitis, celiac disease, disorder of thyroid, gastroparesis, gluten intolerance, anorexia nervosa   Meds reviewed   Last BM today    Malnutrition Risk: Patient presents with severe chronic disease related malnutrition related to going GI issues, gastroparesis and complications from gastric bypass surgery as evidenced by limited oral intake, < 50% of nutrition needs based on patients report, accompanied by evidence of severe and ongoing muscle and fat wasting.  Sunken temples with bone visible, sunken cheek bone areas, wasted and protruding chest area with bones prominent.     Recommendations/Plan:  Discussed TPN plan with Dr. Canada and Kevyn BARRETT.  Strongly agree with plan to start TPN.   TPN per [facility] - discussed with Lauren.  See nutrition needs above.   Advance diet as medically feasible.  Monitor for refeeding: Order BMP w/ Mg and Phos x 7 days. Replete K, Phos and Mg prn. Supplement 100 mg Thiamine x 7 days to reduce risk of refeeding      RD monitoring

## 2023-07-07 NOTE — CARE PLAN
The patient is Stable - Low risk of patient condition declining or worsening    Shift Goals  Clinical Goals: IV hydration, manage pain, N/V, improve nutrition, rest  Patient Goals: Nausea relief, improve nutrition, rest    Progress made toward(s) clinical / shift goals:  IV fluids infusing, medicated for nausea per MAR.     Problem: Pain - Standard  Goal: Alleviation of pain or a reduction in pain to the patient’s comfort goal  Outcome: Progressing     Problem: Knowledge Deficit - Standard  Goal: Patient and family/care givers will demonstrate understanding of plan of care, disease process/condition, diagnostic tests and medications  Outcome: Progressing

## 2023-07-07 NOTE — ASSESSMENT & PLAN NOTE
Hx recurrent pancreatitis, reports of gallstones  Trigs normal  Start creon due to report of greasy stools

## 2023-07-07 NOTE — PROGRESS NOTES
[facility] Medicine Daily Progress Note    Date of Service  7/7/2023    Chief Complaint  Kathy Dumont is a 67 y.o. female admitted 7/6/2023 with diarrhea, abdominal cramping.    Hospital Course  Kathy Dumont is a 67 y.o. female who presented 7/6/2023 with abdominal pain.     Patient has a history of anorexia nervosa, possible celiac disease, gastroparesis, idiopathic recurrent pancreatitis. For the last 4 to 5 years she has been very thin, with a BMI of about 10.  Per her GI doctors, she has a gastric outlet obstruction resulting in severe gastroparesis.  A Sinai-en-Y gastric bypass was performed to give her a completely different opening to her stomach, allowing her to eat and drink to regain her body weight.  Since her Sinai-en-Y gastric bypass, she began to have recent worsening abdominal pain with multiple episodes of diarrhea and weight loss, resulting in poor oral intake and fatigue.     Patient had a CAT scan outpatient and results today showing a possible foci of bowel perforation to which she was referred to the ER immediately.     In the ED, patient found to have borderline hypotension.  Pertinent labs include transaminitis, hypoproteinemia.  General surgery has been contacted, they do not believe that this is a bowel perforation and can be managed conservatively.    Interval Problem Update  Patient admitted yesterday for abdominal cramping, diarrhea, weight loss.  In setting of recent Sinai en Y gastric bypass, suspect dumping syndrome.  Discussed diet modification to avoid exacerbating dumping syndrome. Will start high protein diet, stay away from simple sugars.  Start creon for possible exocrine pancreatitic dysfunction given floating oily stools reported.  Start imodium.  Do not suspect c diff, can stop C diff precautions.  If management with diet modification, creon, imodium is not effective, can resort to TPN administration.   Continue IV fluids for hydration.    I have  discussed this patient's plan of care and discharge plan at IDT rounds today with Case Management, Nursing, Nursing leadership, and other members of the IDT team.    Consultants/Specialty  Surgical oncology    Code Status  Full Code    Disposition  Medically Cleared  I have placed the appropriate orders for post-discharge needs.    Review of Systems  Review of Systems   Constitutional:  Positive for weight loss. Negative for chills and fever.   Eyes:  Negative for blurred vision and pain.   Respiratory:  Negative for cough and shortness of breath.    Cardiovascular:  Negative for chest pain, palpitations and leg swelling.   Gastrointestinal:  Positive for abdominal pain, diarrhea and nausea. Negative for blood in stool, melena and vomiting.   Genitourinary:  Negative for dysuria and urgency.   Musculoskeletal:  Negative for back pain and falls.   Skin:  Negative for itching and rash.   Neurological:  Negative for dizziness, sensory change, focal weakness and headaches.   Psychiatric/Behavioral:  Negative for substance abuse. The patient does not have insomnia.         Physical Exam  Temp:  [35.8 °C (96.5 °F)-36 °C (96.8 °F)] 35.8 °C (96.5 °F)  Pulse:  [47-68] 47  Resp:  [13-25] 16  BP: ()/(54-64) 95/56  SpO2:  [95 %-100 %] 95 %    Physical Exam  Vitals reviewed.   Constitutional:       General: She is not in acute distress.     Appearance: She is not diaphoretic.   HENT:      Head: Normocephalic and atraumatic.      Right Ear: External ear normal.      Left Ear: External ear normal.      Nose: Nose normal. No congestion.      Mouth/Throat:      Pharynx: No oropharyngeal exudate or posterior oropharyngeal erythema.   Eyes:      Extraocular Movements: Extraocular movements intact.      Pupils: Pupils are equal, round, and reactive to light.   Cardiovascular:      Rate and Rhythm: Normal rate and regular rhythm.   Pulmonary:      Effort: Pulmonary effort is normal. No respiratory distress.      Breath sounds:  Normal breath sounds. No wheezing.   Abdominal:      General: Bowel sounds are normal. There is no distension.      Palpations: Abdomen is soft.      Tenderness: There is abdominal tenderness. There is no guarding or rebound.   Musculoskeletal:         General: No swelling. Normal range of motion.      Cervical back: Normal range of motion and neck supple.      Right lower leg: No edema.      Left lower leg: No edema.   Skin:     General: Skin is warm and dry.   Neurological:      General: No focal deficit present.      Mental Status: She is alert and oriented to person, place, and time.      Cranial Nerves: No cranial nerve deficit.      Sensory: No sensory deficit.      Motor: No weakness.   Psychiatric:         Mood and Affect: Mood normal.         Behavior: Behavior normal.         Fluids    Intake/Output Summary (Last 24 hours) at 7/7/2023 1558  Last data filed at 7/6/2023 1748  Gross per 24 hour   Intake 843 ml   Output --   Net 843 ml       Laboratory  Recent Labs     07/06/23  1647   WBC 4.1*   RBC 3.27*   HEMOGLOBIN 10.9*   HEMATOCRIT 31.6*   MCV 96.6   MCH 33.3*   MCHC 34.5   RDW 43.6   PLATELETCT 243   MPV 9.8     Recent Labs     07/06/23  1647   SODIUM 143   POTASSIUM 3.9   CHLORIDE 105   CO2 27   GLUCOSE 85   BUN 11   CREATININE 0.59   CALCIUM 9.4                   Imaging  No orders to display        Assessment/Plan  * Complications of gastric bypass surgery- (present on admission)  Assessment & Plan  Spoke with ERP, who consulted general surgery.  Patient had a history of chronic malnutrition leading to cachexia.  She had a recent Sinai-en-Y surgery that is now complicated by abdominal pain, diarrhea, decreased oral intake, generalized weakness, weight loss.  She had a CAT scan outpatient today of her abdomen that showed a possible bowel perforation to which general surgery at [facility] does not think it is a bowel perforation.  Suspect dumping syndrome in setting of gastric bypass surgery two  months ago  Discussed diet modification with high protein and avoidance of simple sugars  Start creon for possible pancreatic exocrine dysfunction  Imodium prn  Can stop c diff precautions  IV fluids for hydration  Can consider TPN in a few days if symptoms are not improving    Pancreatic insufficiency  Assessment & Plan  Hx recurrent pancreatitis, reports of gallstones  Check lipid levels  Start creon due to report of greasy stools    ACP (advance care planning)  Assessment & Plan  I discussed advance care planning with the patient for at least 16 minutes, including diagnosis, prognosis, plan of care, risks and benefits of any therapies that could be offered, as well as alternatives including palliation and hospice, as appropriate.     Full code     Hypothyroidism- (present on admission)  Assessment & Plan  Continue synthroid         VTE prophylaxis: SCDs/TEDs

## 2023-07-07 NOTE — ASSESSMENT & PLAN NOTE
Spoke with NIKA, who consulted general surgery.  Patient had a history of chronic malnutrition leading to cachexia.  She had a recent Sinai-en-Y surgery that is now complicated by abdominal pain, diarrhea, decreased oral intake, generalized weakness, weight loss.  She had a CAT scan outpatient of her abdomen that showed a possible bowel perforation, presented to the ER  Surgery consulted, do not believe she has perforation  Suspect dumping syndrome in setting of gastric bypass surgery two months ago  Discussed diet modification with high protein and avoidance of simple sugars  Start creon for possible pancreatic exocrine dysfunction  Imodium prn  Improving, defer TPN as patient eating well with improved diarrhea  Repeat CT scan today to evaluate ongoing abdominal pain

## 2023-07-07 NOTE — PROGRESS NOTES
4 Eyes Skin Assessment Completed by YOUSUF Miles and YOUSUF Tran.    Head WDL  Ears WDL  Nose WDL  Mouth WDL  Neck WDL  Breast/Chest WDL  Shoulder Blades WDL  Spine WDL  (R) Arm/Elbow/Hand WDL  (L) Arm/Elbow/Hand WDL  Abdomen WDL, healed surgical scars  Groin WDL  Scrotum/Coccyx/Buttocks Redness and Blanching  (R) Leg Scab  (L) Leg Scab  (R) Heel/Foot/Toe WDL  (L) Heel/Foot/Toe WDL    Pt has very pronounced bony prominences.      Devices In Places PIV      Interventions In Place Pillows, Low Air Loss Mattress, and Barrier Cream    Possible Skin Injury No    Pictures Uploaded Into Epic N/A  Wound Consult Placed N/A  RN Wound Prevention Protocol Ordered Yes

## 2023-07-07 NOTE — ASSESSMENT & PLAN NOTE
I discussed advance care planning with the patient for at least 16 minutes, including diagnosis, prognosis, plan of care, risks and benefits of any therapies that could be offered, as well as alternatives including palliation and hospice, as appropriate.     Full code

## 2023-07-07 NOTE — PROGRESS NOTES
Surgery General Daily Progress Note     Date of Service  July 7, 2023     Chief Complaint  Abdominal Pain, Diarrhea and Hypotension      Hospital Course  HD # 2     Interval Problem Update  No acute events  Pt reports unchanged/low abdominal pain    Problem List  Principal Problem:    Complications of gastric bypass surgery (POA: Yes)  Active Problems:    Hypothyroidism (POA: Yes)    ACP (advance care planning) (POA: Unknown)  Resolved Problems:    Failure to thrive in adult (POA: Unknown)     Subjective  Review of Systems   Constitutional:  Positive for malaise/fatigue and weight loss. Negative for chills and fever.   Respiratory:  Negative for cough.    Cardiovascular:  Negative for chest pain.   Gastrointestinal:  Positive for abdominal pain, diarrhea, nausea and vomiting.   All other systems reviewed and are negative.      Objective  Temp:  [35.8 °C (96.5 °F)-36.4 °C (97.6 °F)] 35.8 °C (96.5 °F)  Pulse:  [47-86] 47  Resp:  [13-25] 16  BP: ()/(54-64) 95/56  SpO2:  [95 %-100 %] 95 %      Physical Exam  Vitals and nursing note reviewed.   Constitutional:       General: She is not in acute distress.     Appearance: She is cachectic. She is ill-appearing.   HENT:      Head: Normocephalic and atraumatic.      Nose: Nose normal.      Mouth/Throat:      Pharynx: Oropharynx is clear.   Eyes:      Conjunctiva/sclera: Conjunctivae normal.   Cardiovascular:      Rate and Rhythm: Bradycardia present.   Pulmonary:      Effort: Pulmonary effort is normal. No respiratory distress.   Abdominal:      General: There is no distension.      Palpations: Abdomen is soft.      Tenderness: There is no abdominal tenderness. There is no guarding.   Skin:     General: Skin is warm and dry.   Neurological:      Mental Status: She is alert and oriented to person, place, and time.   Psychiatric:         Mood and Affect: Mood is depressed.         Behavior: Behavior normal.        Fluids  Intake/Output Summary (Last 24 hours) at 7/7/2023  1037  Last data filed at 7/6/2023 1748  Gross per 24 hour   Intake 843 ml   Output --   Net 843 ml       Labs  Lab Results   Component Value Date/Time    SODIUM 143 07/06/2023 04:47 PM    POTASSIUM 3.9 07/06/2023 04:47 PM    CHLORIDE 105 07/06/2023 04:47 PM    CO2 27 07/06/2023 04:47 PM    GLUCOSE 85 07/06/2023 04:47 PM    BUN 11 07/06/2023 04:47 PM    CREATININE 0.59 07/06/2023 04:47 PM         No results found for: PROTHROMBTM, INR      Lab Results   Component Value Date/Time    WBC 4.1 (L) 07/06/2023 04:47 PM    RBC 3.27 (L) 07/06/2023 04:47 PM    HEMOGLOBIN 10.9 (L) 07/06/2023 04:47 PM    HEMATOCRIT 31.6 (L) 07/06/2023 04:47 PM    MCV 96.6 07/06/2023 04:47 PM    MCH 33.3 (H) 07/06/2023 04:47 PM    MCHC 34.5 07/06/2023 04:47 PM    MPV 9.8 07/06/2023 04:47 PM    NEUTSPOLYS 57.80 07/06/2023 04:47 PM    LYMPHOCYTES 35.70 07/06/2023 04:47 PM    MONOCYTES 5.90 07/06/2023 04:47 PM    EOSINOPHILS 0.20 07/06/2023 04:47 PM    BASOPHILS 0.20 07/06/2023 04:47 PM         Recent Labs     07/06/23  1647   ASTSGOT 157*   ALTSGPT 125*   TBILIRUBIN 0.6   GLOBULIN 1.6*         Assessment  Patient is a 67F severe protein calorie malnutrition, gastroparesis s/p rosalie-y gastro-j with Dr. Cisneros 5/2023 now with pain, diarrhea, dereased PO intake. She is dehydrated and quite emaciated and continues to lose weight.  She is severely malnourished.     Plan  - Start TPN nutrition, expect patient continue with TPN at home after DC  - Continue regular diet at tolerated (in addition to TPN)  - We will organize referral to GI motility clinic at [facility]  - Dietician support  - No surgical plans     Surgical Oncology team will be available for help, but will not actively follow patient       Kevyn Newby MS, MPH, NP  Surgical Oncology  July 7, 2023, 10:37 AM

## 2023-07-07 NOTE — ED NOTES
Med Rec complete per patient   Allergies reviewed  No oral antibiotics in the last 30 days  Preferred pharmacy: [facility]

## 2023-07-07 NOTE — ASSESSMENT & PLAN NOTE
Spoke with NIKA, who consulted general surgery.  Patient had a history of chronic malnutrition leading to cachexia.  She had a recent Sinai-en-Y surgery that is now complicated by abdominal pain, diarrhea, decreased oral intake, generalized weakness, weight loss.  She had a CAT scan outpatient today of her abdomen that showed a possible bowel perforation to which general surgery at [facility] does not think it is a bowel perforation.  Nonetheless, patient has been unable to tolerate any oral intake and is not a safe discharge.  She will need to be monitored in the hospital for rehydration, nutrition consult.  Stool studies sent to check for infection, including C. difficile.  Monitor for fluid overload from fluid administration.

## 2023-07-07 NOTE — PROGRESS NOTES
Pt is A&Ox4, RA, ambulatory w/ no assist. All medications taken and tolerated, medicated for nausea per MAR. Pt resting in bed w/ call light and belongings in reach, bed locked in lowest position, hourly rounding in place. No additional needs at this time.

## 2023-07-07 NOTE — CONSULTS
Date of Service  July 6, 2023     Reason for Consult: abdominal pain, diarrhea, poor PO intake, failure to thrive    Requested by: Dr. Russell    Location: [facility]    Chief Complaint  Abdominal Pain, Diarrhea (Pt sent by MD. Pt found to have a bowel perforation by CT. Pt states she has been having abd pain/diarrhea/nausea. Pt states she recently had Abd surgery in May. ), and Hypotension        HPI: Patient is a 67F history of gastroparesis and failure to thrive who underwent Sinai-y gastrojejunostomy 5/5/23 with Dr. Cisneros. Her initial recovery was unremarkable and she was tolerating a diet for about 1 month after surgery but over the last 3 weeks she has had ongoing diarrhea, poor PO intake and has become increasingly malnourished.  After she eats a small amount she ntoes bloating and pain about 30 minutes later, this is followed by diarrhea.  It occurs with every meal and all PO intake, nothing specific that makes it better or worse.  Given her symptoms she underwent a CT scan today in [facility].  It shows contrast transit through the bowel and into colon.  There was a comment by outside radiologist that there appeared to be punctate areas of free air therefor the patient presented to the [facility] ED.      In the ER she was noted to be slightly hypotensive with SBP in 70s (baseline low 90s, high 80s) but normocardic.  Labs showed slight bump in LFTs but otheriwse unremarkable.  Her creatinine suggests she is slightly dehydrated.  I personally reviewed and discussed with our radiologist at [facility], there is no evidence of free air this actually appears to be pneumobilia and was seen on prior scan.    Past Medical History  Past Medical History:   Diagnosis Date    Acute recurrent pancreatitis 08/2021    Anesthesia     PONV    Arthritis 08/2021    fingers    Celiac disease     Disorder of thyroid     hypothyroid    Gastroparesis 08/2021    Gluten intolerance        Past Surgical History  Past Surgical History:    Procedure Laterality Date    GASTROJEJUNOSTOMY N/A 5/5/2023    Procedure: MAIK-EN-Y GASTROJEJUNOSTOMY BYPASS AND EXPLORATORY LAPAORTOMY;  Surgeon: Bradly Cisneros M.D.;  Location: [facility];  Service: General    CO UPPER GI ENDOSCOPY,DIAGNOSIS  4/18/2022    Procedure: GASTROSCOPY - WITH PYLORUS DILATION WITH BOTOX INJECTION;  Surgeon: Riavs Cadet M.D.;  Location: [facility];  Service: Gastroenterology    CO ERCP,DIAGNOSTIC  4/18/2022    Procedure: ERCP (ENDOSCOPIC RETROGRADE CHOLANGIOPANCREATOGRAPHY);  Surgeon: Rivas Cadet M.D.;  Location: [facility];  Service: Gastroenterology    CO UPPER GI ENDOSCOPY,DIAGNOSIS N/A 8/9/2021    Procedure: GASTROSCOPY;  Surgeon: Rivas Cadet M.D.;  Location: [facility];  Service: EUS    CO ERCP,DIAGNOSTIC N/A 8/9/2021    Procedure: ERCP, DIAGNOSTIC;  Surgeon: Rivas Cadet M.D.;  Location: [facility];  Service: EUS    EGD W/ENDOSCOPIC ULTRASOUND N/A 8/9/2021    Procedure: EGD, WITH ENDOSCOPIC US - UPPER RADIAL;  Surgeon: Rivas Cadet M.D.;  Location: [facility];  Service: EUS    ORIF, HUMERUS Left 2006    KNEE ARTHROSCOPY Left 1973    CHOLECYSTECTOMY  1963    APPENDECTOMY CHILD      PRIMARY C SECTION  1984, 1987, 1990       Current Medications:   Home Medications    Medication Sig Taking? Last Dose Authorizing Provider   HYDROcodone-acetaminophen (NORCO) 5-325 MG Tab per tablet Take 1 Tablet by mouth every four hours as needed (PAIN). Yes 7/4/2023 at [facility]   CALCIUM PO Take 1 Tablet by mouth every day. Yes 7/5/2023 at [facility]   multivitamin Tab Take 1 Tablet by mouth every day. Yes 7/5/2023 at [facility]   ondansetron (ZOFRAN) 4 MG Tab tablet Take 4 mg by mouth every 8 hours as needed for Nausea/Vomiting.  7/4/2023 at [facility]   levothyroxine (SYNTHROID) 50 MCG Tab Take 50 mcg by mouth every morning  "on an empty stomach.  7/5/2023 at [facility]         Allergies:   Allergies   Allergen Reactions    Hydrocodone-Acetaminophen Rash     Rash, \"I can tolerate in small amounts\"      Loratadine-Pseudoephedrine Rash     rash    Sertraline Hcl Palpitations    Tramadol Vomiting     Fainting    Zoloft Rash     rash    Codeine Vomiting    Gluten Meal      \"violent stomach upset for 4-5 days\"       Problem List  Principal Problem:    Complications of gastric bypass surgery (POA: Yes)  Active Problems:    Hypothyroidism (POA: Yes)    ACP (advance care planning) (POA: Unknown)  Resolved Problems:    Failure to thrive in adult (POA: Unknown)       Subjective  Review of Systems   Constitutional:  Positive for malaise/fatigue and weight loss. Negative for chills and fever.   HENT:  Negative for congestion, ear discharge, ear pain, hearing loss and nosebleeds.    Eyes:  Negative for blurred vision, double vision, photophobia, pain and discharge.   Respiratory:  Negative for cough, hemoptysis and sputum production.    Cardiovascular:  Negative for chest pain, palpitations, orthopnea and claudication.   Gastrointestinal:  Positive for abdominal pain, diarrhea and nausea. Negative for constipation, heartburn and vomiting.   Genitourinary:  Negative for dysuria, frequency, hematuria and urgency.   Musculoskeletal:  Negative for back pain, joint pain, myalgias and neck pain.   Skin:  Negative for itching and rash.   Neurological:  Positive for weakness. Negative for dizziness, tingling, tremors, sensory change, speech change, focal weakness and headaches.   Endo/Heme/Allergies:  Negative for environmental allergies. Does not bruise/bleed easily.   Psychiatric/Behavioral:  Negative for depression, hallucinations, substance abuse and suicidal ideas. The patient is not nervous/anxious.          Objective  Temp:  [36.4 °C (97.6 °F)] 36.4 °C (97.6 °F)  Pulse:  [55-86] 58  Resp:  [15-25] 17  BP: ()/(54-61) 111/61  SpO2:  " [97 %-100 %] 97 %      Physical Exam  Constitutional:       General: She is not in acute distress.     Appearance: She is ill-appearing.      Comments: cachectic   HENT:      Head:      Comments: Temporal wasting  Eyes:      Conjunctiva/sclera: Conjunctivae normal.      Pupils: Pupils are equal, round, and reactive to light.   Cardiovascular:      Rate and Rhythm: Normal rate and regular rhythm.   Pulmonary:      Effort: Pulmonary effort is normal. No respiratory distress.   Abdominal:      Comments: Soft, NT, ND, incision well healed.   Musculoskeletal:         General: No swelling or deformity.      Cervical back: Neck supple.   Neurological:      Mental Status: She is alert.          Fluids    Intake/Output Summary (Last 24 hours) at 7/6/2023 2215  Last data filed at 7/6/2023 1748  Gross per 24 hour   Intake 843 ml   Output --   Net 843 ml         Labs  Lab Results   Component Value Date/Time    WBC 4.1 (L) 07/06/2023 04:47 PM    RBC 3.27 (L) 07/06/2023 04:47 PM    HEMOGLOBIN 10.9 (L) 07/06/2023 04:47 PM    HEMATOCRIT 31.6 (L) 07/06/2023 04:47 PM    MCV 96.6 07/06/2023 04:47 PM    MCH 33.3 (H) 07/06/2023 04:47 PM    MCHC 34.5 07/06/2023 04:47 PM    MPV 9.8 07/06/2023 04:47 PM    NEUTSPOLYS 57.80 07/06/2023 04:47 PM    LYMPHOCYTES 35.70 07/06/2023 04:47 PM    MONOCYTES 5.90 07/06/2023 04:47 PM    EOSINOPHILS 0.20 07/06/2023 04:47 PM    BASOPHILS 0.20 07/06/2023 04:47 PM        Lab Results   Component Value Date/Time    SODIUM 143 07/06/2023 04:47 PM    POTASSIUM 3.9 07/06/2023 04:47 PM    CHLORIDE 105 07/06/2023 04:47 PM    CO2 27 07/06/2023 04:47 PM    GLUCOSE 85 07/06/2023 04:47 PM    BUN 11 07/06/2023 04:47 PM    CREATININE 0.59 07/06/2023 04:47 PM        No results found for: PROTHROMBTM, INR     Recent Labs     07/06/23  1647   ASTSGOT 157*   ALTSGPT 125*   TBILIRUBIN 0.6   GLOBULIN 1.6*       Imaging    Outside CT personally reviewed- no major findings, no free air, some pneumobilia present on scan from 1  month ago      Pathology  none  Principal Problem:    Complications of gastric bypass surgery (POA: Yes)  Active Problems:    Hypothyroidism (POA: Yes)    ACP (advance care planning) (POA: Unknown)  Resolved Problems:    Failure to thrive in adult (POA: Unknown)       Assessment and Plan  Patient is a 67F severe protein calorie malnutrition, gastroparesis s/p rosalie-y gastro-j with Dr. Cisneros 5/2023 now with pain, diarrhea, dereased PO intake.      Reviewed imaging and discussed findings with patient.  I suspect she may either be dealing with dumping syndrome secondary to her bypass, another possibility is exocrine pnacreatic insufficiency.  She is dehydrated and will need to tease out GI issues as patient is quite emaciated and continues to lose weight.  She is severely malnourished.    Rec:  -admit for hydration  -diet ok  -will involve dietician tomorrow  -consider trial of creon  -discussed with trial adjusting diet to try to limit dumping and see if sumptoms improve    The assessment and plan discussed with ER, patient, family.    Spencer Rose M.D.

## 2023-07-07 NOTE — CARE PLAN
The patient is Stable - Low risk of patient condition declining or worsening    Shift Goals  Clinical Goals: Nutrition improvement, IV hydration, rest  Patient Goals: Nausea relief, improved nutrition  Family Goals: ABBE    Progress made toward(s) clinical / shift goals:    IV hydration started    Patient is not progressing towards the following goals:

## 2023-07-08 LAB
ALBUMIN SERPL BCP-MCNC: 3.2 G/DL (ref 3.2–4.9)
ALBUMIN/GLOB SERPL: 2.5 G/DL
ALP SERPL-CCNC: 91 U/L (ref 30–99)
ALT SERPL-CCNC: 64 U/L (ref 2–50)
ANION GAP SERPL CALC-SCNC: 7 MMOL/L (ref 7–16)
AST SERPL-CCNC: 53 U/L (ref 12–45)
BILIRUB SERPL-MCNC: 0.6 MG/DL (ref 0.1–1.5)
BUN SERPL-MCNC: 11 MG/DL (ref 8–22)
CALCIUM ALBUM COR SERPL-MCNC: 9.1 MG/DL (ref 8.5–10.5)
CALCIUM SERPL-MCNC: 8.5 MG/DL (ref 8.5–10.5)
CHLORIDE SERPL-SCNC: 104 MMOL/L (ref 96–112)
CHOLEST SERPL-MCNC: 181 MG/DL (ref 100–199)
CO2 SERPL-SCNC: 23 MMOL/L (ref 20–33)
CREAT SERPL-MCNC: 0.58 MG/DL (ref 0.5–1.4)
E COLI SXT1+2 STL IA: NORMAL
GFR SERPLBLD CREATININE-BSD FMLA CKD-EPI: 99 ML/MIN/1.73 M 2
GLOBULIN SER CALC-MCNC: 1.3 G/DL (ref 1.9–3.5)
GLUCOSE SERPL-MCNC: 98 MG/DL (ref 65–99)
HDLC SERPL-MCNC: 62 MG/DL
LDLC SERPL CALC-MCNC: 109 MG/DL
MAGNESIUM SERPL-MCNC: 1.6 MG/DL (ref 1.5–2.5)
PHOSPHATE SERPL-MCNC: 2.9 MG/DL (ref 2.5–4.5)
POTASSIUM SERPL-SCNC: 4 MMOL/L (ref 3.6–5.5)
PROT SERPL-MCNC: 4.5 G/DL (ref 6–8.2)
SIGNIFICANT IND 70042: NORMAL
SITE SITE: NORMAL
SODIUM SERPL-SCNC: 134 MMOL/L (ref 135–145)
SOURCE SOURCE: NORMAL
TRIGL SERPL-MCNC: 50 MG/DL (ref 0–149)
TSH SERPL DL<=0.005 MIU/L-ACNC: 2.17 UIU/ML (ref 0.38–5.33)

## 2023-07-08 PROCEDURE — 97162 PT EVAL MOD COMPLEX 30 MIN: CPT

## 2023-07-08 PROCEDURE — A9270 NON-COVERED ITEM OR SERVICE: HCPCS | Performed by: STUDENT IN AN ORGANIZED HEALTH CARE EDUCATION/TRAINING PROGRAM

## 2023-07-08 PROCEDURE — 770006 HCHG ROOM/CARE - MED/SURG/GYN SEMI*

## 2023-07-08 PROCEDURE — 36415 COLL VENOUS BLD VENIPUNCTURE: CPT

## 2023-07-08 PROCEDURE — 700102 HCHG RX REV CODE 250 W/ 637 OVERRIDE(OP): Performed by: STUDENT IN AN ORGANIZED HEALTH CARE EDUCATION/TRAINING PROGRAM

## 2023-07-08 PROCEDURE — 700111 HCHG RX REV CODE 636 W/ 250 OVERRIDE (IP): Mod: JZ | Performed by: STUDENT IN AN ORGANIZED HEALTH CARE EDUCATION/TRAINING PROGRAM

## 2023-07-08 PROCEDURE — 84443 ASSAY THYROID STIM HORMONE: CPT

## 2023-07-08 PROCEDURE — 84100 ASSAY OF PHOSPHORUS: CPT

## 2023-07-08 PROCEDURE — 80053 COMPREHEN METABOLIC PANEL: CPT

## 2023-07-08 PROCEDURE — 80061 LIPID PANEL: CPT

## 2023-07-08 PROCEDURE — 99232 SBSQ HOSP IP/OBS MODERATE 35: CPT | Performed by: STUDENT IN AN ORGANIZED HEALTH CARE EDUCATION/TRAINING PROGRAM

## 2023-07-08 PROCEDURE — 83735 ASSAY OF MAGNESIUM: CPT

## 2023-07-08 PROCEDURE — 700111 HCHG RX REV CODE 636 W/ 250 OVERRIDE (IP): Performed by: STUDENT IN AN ORGANIZED HEALTH CARE EDUCATION/TRAINING PROGRAM

## 2023-07-08 PROCEDURE — 700101 HCHG RX REV CODE 250: Performed by: STUDENT IN AN ORGANIZED HEALTH CARE EDUCATION/TRAINING PROGRAM

## 2023-07-08 RX ORDER — MORPHINE SULFATE 4 MG/ML
3 INJECTION INTRAVENOUS EVERY 4 HOURS PRN
Status: DISCONTINUED | OUTPATIENT
Start: 2023-07-08 | End: 2023-07-11 | Stop reason: HOSPADM

## 2023-07-08 RX ADMIN — LOPERAMIDE HYDROCHLORIDE 2 MG: 2 CAPSULE ORAL at 06:02

## 2023-07-08 RX ADMIN — LEVOTHYROXINE SODIUM 50 MCG: 0.05 TABLET ORAL at 04:27

## 2023-07-08 RX ADMIN — PANCRELIPASE 12000 UNITS: 30000; 6000; 19000 CAPSULE, DELAYED RELEASE PELLETS ORAL at 15:07

## 2023-07-08 RX ADMIN — MORPHINE SULFATE 3 MG: 4 INJECTION, SOLUTION INTRAMUSCULAR; INTRAVENOUS at 20:31

## 2023-07-08 RX ADMIN — POTASSIUM CHLORIDE, DEXTROSE MONOHYDRATE AND SODIUM CHLORIDE: 150; 5; 900 INJECTION, SOLUTION INTRAVENOUS at 04:27

## 2023-07-08 RX ADMIN — ENOXAPARIN SODIUM 30 MG: 100 INJECTION SUBCUTANEOUS at 17:52

## 2023-07-08 RX ADMIN — OXYCODONE HYDROCHLORIDE 5 MG: 5 TABLET ORAL at 16:15

## 2023-07-08 RX ADMIN — POTASSIUM CHLORIDE, DEXTROSE MONOHYDRATE AND SODIUM CHLORIDE: 150; 5; 900 INJECTION, SOLUTION INTRAVENOUS at 21:58

## 2023-07-08 RX ADMIN — THERA TABS 1 TABLET: TAB at 04:27

## 2023-07-08 RX ADMIN — PANCRELIPASE 12000 UNITS: 30000; 6000; 19000 CAPSULE, DELAYED RELEASE PELLETS ORAL at 20:32

## 2023-07-08 RX ADMIN — PANCRELIPASE 12000 UNITS: 30000; 6000; 19000 CAPSULE, DELAYED RELEASE PELLETS ORAL at 08:56

## 2023-07-08 ASSESSMENT — ENCOUNTER SYMPTOMS
DIZZINESS: 0
BLURRED VISION: 0
CHILLS: 0
DIARRHEA: 1
SHORTNESS OF BREATH: 0
EYE PAIN: 0
FEVER: 0
WEIGHT LOSS: 1
SENSORY CHANGE: 0
VOMITING: 0
COUGH: 0
FALLS: 0
NAUSEA: 1
BACK PAIN: 0
PALPITATIONS: 0
INSOMNIA: 0
ABDOMINAL PAIN: 1
FOCAL WEAKNESS: 0
BLOOD IN STOOL: 0
HEADACHES: 0

## 2023-07-08 ASSESSMENT — FIBROSIS 4 INDEX: FIB4 SCORE: 1.83

## 2023-07-08 ASSESSMENT — LIFESTYLE VARIABLES: SUBSTANCE_ABUSE: 0

## 2023-07-08 ASSESSMENT — COGNITIVE AND FUNCTIONAL STATUS - GENERAL
CLIMB 3 TO 5 STEPS WITH RAILING: A LITTLE
MOBILITY SCORE: 22
WALKING IN HOSPITAL ROOM: A LITTLE
SUGGESTED CMS G CODE MODIFIER MOBILITY: CJ

## 2023-07-08 ASSESSMENT — GAIT ASSESSMENTS
DEVIATION: INCREASED BASE OF SUPPORT
GAIT LEVEL OF ASSIST: CONTACT GUARD ASSIST
DISTANCE (FEET): 400

## 2023-07-08 ASSESSMENT — PAIN DESCRIPTION - PAIN TYPE: TYPE: ACUTE PAIN

## 2023-07-08 NOTE — PROGRESS NOTES
Received report from NOC RN, pt care assumed. VS stable on RA. No signs of acute distress. Pt is aaox4 and denies pain. Bed-alarm refused.

## 2023-07-08 NOTE — CARE PLAN
Plan of care discussed with patient. Patient is being monitored and medicated for nausea/diarrhea per the MAR. Bed is locked and in lowest position, call light and belongings within reach. Bi hourly rounding in place.       The patient is Stable - Low risk of patient condition declining or worsening    Shift Goals  Clinical Goals: increased nutrition, IV hydration  Patient Goals: improve nutrition  Family Goals: NA    Progress made toward(s) clinical / shift goals:    Problem: Pain - Standard  Goal: Alleviation of pain or a reduction in pain to the patient’s comfort goal  Outcome: Progressing     Problem: Knowledge Deficit - Standard  Goal: Patient and family/care givers will demonstrate understanding of plan of care, disease process/condition, diagnostic tests and medications  Outcome: Progressing     Problem: Fall Risk  Goal: Patient will remain free from falls  Outcome: Progressing       Patient is not progressing towards the following goals:

## 2023-07-08 NOTE — PROGRESS NOTES
[facility] Medicine Daily Progress Note    Date of Service  7/8/2023    Chief Complaint  Kathy Dumont is a 67 y.o. female admitted 7/6/2023 with diarrhea, abdominal cramping.    Hospital Course  Kathy Dumont is a 67 y.o. female who presented 7/6/2023 with abdominal pain.     Patient has a history of anorexia nervosa, possible celiac disease, gastroparesis, idiopathic recurrent pancreatitis. For the last 4 to 5 years she has been very thin, with a BMI of about 10.  Per her GI doctors, she has a gastric outlet obstruction resulting in severe gastroparesis.  A Sinai-en-Y gastric bypass was performed to give her a completely different opening to her stomach, allowing her to eat and drink to regain her body weight.  Since her Sinai-en-Y gastric bypass, she began to have recent worsening abdominal pain with multiple episodes of diarrhea and weight loss, resulting in poor oral intake and fatigue.     Patient had a CAT scan outpatient and results today showing a possible foci of bowel perforation to which she was referred to the ER immediately.     In the ED, patient found to have borderline hypotension.  Pertinent labs include transaminitis, hypoproteinemia.  General surgery has been contacted, they do not believe that this is a bowel perforation and can be managed conservatively.    Interval Problem Update  No acute events overnight.  Patient with diarrhea yesterday.  BP and LFT's on labs improved.  Continue IV fluids for hydration.  Continue modified diet, high protein, avoid simple sugars.  Continue imodium prn, continue creon TID.  C diff negative.      I have discussed this patient's plan of care and discharge plan at IDT rounds today with Case Management, Nursing, Nursing leadership, and other members of the IDT team.    Consultants/Specialty  Surgical oncology    Code Status  Full Code    Disposition  The patient is not medically cleared for discharge to home or a post-acute  facility.      I have placed the appropriate orders for post-discharge needs.    Review of Systems  Review of Systems   Constitutional:  Positive for weight loss. Negative for chills and fever.   Eyes:  Negative for blurred vision and pain.   Respiratory:  Negative for cough and shortness of breath.    Cardiovascular:  Negative for chest pain, palpitations and leg swelling.   Gastrointestinal:  Positive for abdominal pain, diarrhea and nausea. Negative for blood in stool, melena and vomiting.   Genitourinary:  Negative for dysuria and urgency.   Musculoskeletal:  Negative for back pain and falls.   Skin:  Negative for itching and rash.   Neurological:  Negative for dizziness, sensory change, focal weakness and headaches.   Psychiatric/Behavioral:  Negative for substance abuse. The patient does not have insomnia.         Physical Exam  Temp:  [36.4 °C (97.6 °F)-37.2 °C (98.9 °F)] 36.4 °C (97.6 °F)  Pulse:  [51-62] 51  Resp:  [14-16] 16  BP: ()/(52-65) 93/58  SpO2:  [90 %-99 %] 99 %    Physical Exam  Vitals reviewed.   Constitutional:       General: She is not in acute distress.     Appearance: She is not diaphoretic.   HENT:      Head: Normocephalic and atraumatic.      Right Ear: External ear normal.      Left Ear: External ear normal.      Nose: Nose normal. No congestion.      Mouth/Throat:      Pharynx: No oropharyngeal exudate or posterior oropharyngeal erythema.   Eyes:      Extraocular Movements: Extraocular movements intact.      Pupils: Pupils are equal, round, and reactive to light.   Cardiovascular:      Rate and Rhythm: Normal rate and regular rhythm.   Pulmonary:      Effort: Pulmonary effort is normal. No respiratory distress.      Breath sounds: Normal breath sounds. No wheezing.   Abdominal:      General: Bowel sounds are normal. There is no distension.      Palpations: Abdomen is soft.      Tenderness: There is abdominal tenderness. There is no guarding or rebound.   Musculoskeletal:          General: No swelling. Normal range of motion.      Cervical back: Normal range of motion and neck supple.      Right lower leg: No edema.      Left lower leg: No edema.   Skin:     General: Skin is warm and dry.   Neurological:      General: No focal deficit present.      Mental Status: She is alert and oriented to person, place, and time.      Cranial Nerves: No cranial nerve deficit.      Sensory: No sensory deficit.      Motor: No weakness.   Psychiatric:         Mood and Affect: Mood normal.         Behavior: Behavior normal.         Fluids  No intake or output data in the 24 hours ending 07/08/23 1301      Laboratory  Recent Labs     07/06/23  1647   WBC 4.1*   RBC 3.27*   HEMOGLOBIN 10.9*   HEMATOCRIT 31.6*   MCV 96.6   MCH 33.3*   MCHC 34.5   RDW 43.6   PLATELETCT 243   MPV 9.8       Recent Labs     07/06/23  1647 07/08/23  0216   SODIUM 143 134*   POTASSIUM 3.9 4.0   CHLORIDE 105 104   CO2 27 23   GLUCOSE 85 98   BUN 11 11   CREATININE 0.59 0.58   CALCIUM 9.4 8.5               Recent Labs     07/08/23  0216   TRIGLYCERIDE 50   HDL 62   *       Imaging  No orders to display          Assessment/Plan  * Complications of gastric bypass surgery- (present on admission)  Assessment & Plan  Spoke with ERP, who consulted general surgery.  Patient had a history of chronic malnutrition leading to cachexia.  She had a recent Sinai-en-Y surgery that is now complicated by abdominal pain, diarrhea, decreased oral intake, generalized weakness, weight loss.  She had a CAT scan outpatient today of her abdomen that showed a possible bowel perforation to which general surgery at [facility] does not think it is a bowel perforation.  Suspect dumping syndrome in setting of gastric bypass surgery two months ago  Discussed diet modification with high protein and avoidance of simple sugars  Start creon for possible pancreatic exocrine dysfunction  Imodium prn  Can stop c diff precautions  IV fluids for hydration  Can  consider TPN in a few days if symptoms are not improving    Pancreatic insufficiency  Assessment & Plan  Hx recurrent pancreatitis, reports of gallstones  Check lipid levels  Start creon due to report of greasy stools    ACP (advance care planning)  Assessment & Plan  I discussed advance care planning with the patient for at least 16 minutes, including diagnosis, prognosis, plan of care, risks and benefits of any therapies that could be offered, as well as alternatives including palliation and hospice, as appropriate.     Full code     Hypothyroidism- (present on admission)  Assessment & Plan  Continue synthroid         VTE prophylaxis: SCDs/TEDs

## 2023-07-08 NOTE — THERAPY
Physical Therapy   Initial Evaluation     Patient Name: Kathy Dumont  Age:  67 y.o., Sex:  female  Medical Record #: [id number]  Today's Date: 7/8/2023     Precautions  Precautions: Fall Risk    Assessment  Pt presents with impaired activity tolerance, dynamic balance and strength associated with chief complaint of abdominal pain, diarrhea and low BP in setting of rosalie-en-y gastric bypass, gastroparesis, anorexia. Pt is most limited by reserve and muscle bulk; reports wobbliness with fatigue, is able to ambulate community distances without device but contact guard; discussed focus on appetite and exercise only to promote appetite to preserve calories/energy. Functionally, recommend dc home when medically appropriate to do so. Will follow.     Plan    Physical Therapy Initial Treatment Plan   Treatment Plan : Bed Mobility, Equipment, Manual Therapy, Gait Training, Neuro Re-Education / Balance, Self Care / Home Evaluation, Stair Training, Therapeutic Activities, Therapeutic Exercise  Treatment Frequency: 2 Times per Week  Duration: Until Therapy Goals Met    DC Equipment Recommendations: None  Discharge Recommendations: Anticipate that the patient will have no further physical therapy needs after discharge from the hospital       Abridged Subjective/Objective     07/08/23 1550   Prior Living Situation   Prior Services Home-Independent   Housing / Facility [address]   Steps Into Home 3   Equipment Owned None   Lives with - Patient's Self Care Capacity Significant Other   Comments denies falls after return home; did not receive home health; likes to walk to her shed for 'freeze drying' fruit and vegatables but reports she does not eat them   Prior Level of Functional Mobility   Bed Mobility Independent   Transfer Status Independent   Ambulation Independent   Ambulation Distance to tolerance   Assistive Devices Used None   Cognition    Cognition / Consciousness WDL   Comments pleasant and cooperative;    Passive ROM Lower Body   Passive ROM Lower Body WDL   Strength Lower Body   Lower Body Strength  X   Comments grossly 3+/5 but little reserve   Sensation Lower Body   Lower Extremity Sensation   WDL   Balance Assessment   Sitting Balance (Static) Fair +   Sitting Balance (Dynamic) Fair   Standing Balance (Static) Fair   Standing Balance (Dynamic) Fair -   Weight Shift Sitting Good   Weight Shift Standing Good   Comments no UE support in sitting/standing; no loss of balance in moving environment but little room for perturbation   Bed Mobility    Supine to Sit Supervised   Sit to Supine Supervised   Gait Analysis   Gait Level Of Assist Contact Guard Assist   Assistive Device None   Distance (Feet) 400   # of Times Distance was Traveled 1   Deviation Increased Base Of Support   Weight Bearing Status full   Vision Deficits Impacting Mobility denies   Comments distance limited by therapist; pt denies symptoms until asked at the end then stating she was 'getting wobbly'   Functional Mobility   Sit to Stand Supervised   Short Term Goals    Short Term Goal # 1 Pt will ascend/escend 3 stairs with unilateral UE support and supervision within 6 visits to ensure independent mobility at home.

## 2023-07-09 LAB
ANION GAP SERPL CALC-SCNC: 7 MMOL/L (ref 7–16)
BACTERIA STL CULT: NORMAL
BUN SERPL-MCNC: 6 MG/DL (ref 8–22)
C JEJUNI+C COLI AG STL QL: NORMAL
CALCIUM SERPL-MCNC: 8.4 MG/DL (ref 8.5–10.5)
CHLORIDE SERPL-SCNC: 107 MMOL/L (ref 96–112)
CO2 SERPL-SCNC: 26 MMOL/L (ref 20–33)
CREAT SERPL-MCNC: 0.62 MG/DL (ref 0.5–1.4)
E COLI SXT1+2 STL IA: NORMAL
GFR SERPLBLD CREATININE-BSD FMLA CKD-EPI: 97 ML/MIN/1.73 M 2
GLUCOSE SERPL-MCNC: 131 MG/DL (ref 65–99)
POTASSIUM SERPL-SCNC: 3.9 MMOL/L (ref 3.6–5.5)
SIGNIFICANT IND 70042: NORMAL
SITE SITE: NORMAL
SODIUM SERPL-SCNC: 140 MMOL/L (ref 135–145)
SOURCE SOURCE: NORMAL

## 2023-07-09 PROCEDURE — 700111 HCHG RX REV CODE 636 W/ 250 OVERRIDE (IP): Performed by: STUDENT IN AN ORGANIZED HEALTH CARE EDUCATION/TRAINING PROGRAM

## 2023-07-09 PROCEDURE — 770006 HCHG ROOM/CARE - MED/SURG/GYN SEMI*

## 2023-07-09 PROCEDURE — 36415 COLL VENOUS BLD VENIPUNCTURE: CPT

## 2023-07-09 PROCEDURE — 99232 SBSQ HOSP IP/OBS MODERATE 35: CPT | Performed by: STUDENT IN AN ORGANIZED HEALTH CARE EDUCATION/TRAINING PROGRAM

## 2023-07-09 PROCEDURE — A9270 NON-COVERED ITEM OR SERVICE: HCPCS | Performed by: STUDENT IN AN ORGANIZED HEALTH CARE EDUCATION/TRAINING PROGRAM

## 2023-07-09 PROCEDURE — 80048 BASIC METABOLIC PNL TOTAL CA: CPT

## 2023-07-09 PROCEDURE — 700101 HCHG RX REV CODE 250: Performed by: STUDENT IN AN ORGANIZED HEALTH CARE EDUCATION/TRAINING PROGRAM

## 2023-07-09 PROCEDURE — 700102 HCHG RX REV CODE 250 W/ 637 OVERRIDE(OP): Performed by: STUDENT IN AN ORGANIZED HEALTH CARE EDUCATION/TRAINING PROGRAM

## 2023-07-09 RX ADMIN — PANCRELIPASE 12000 UNITS: 30000; 6000; 19000 CAPSULE, DELAYED RELEASE PELLETS ORAL at 10:12

## 2023-07-09 RX ADMIN — THERA TABS 1 TABLET: TAB at 06:39

## 2023-07-09 RX ADMIN — LEVOTHYROXINE SODIUM 50 MCG: 0.05 TABLET ORAL at 06:39

## 2023-07-09 RX ADMIN — ENOXAPARIN SODIUM 30 MG: 100 INJECTION SUBCUTANEOUS at 17:46

## 2023-07-09 RX ADMIN — POTASSIUM CHLORIDE, DEXTROSE MONOHYDRATE AND SODIUM CHLORIDE: 150; 5; 900 INJECTION, SOLUTION INTRAVENOUS at 09:01

## 2023-07-09 RX ADMIN — OXYCODONE HYDROCHLORIDE 5 MG: 5 TABLET ORAL at 14:02

## 2023-07-09 RX ADMIN — OXYCODONE HYDROCHLORIDE 5 MG: 5 TABLET ORAL at 22:09

## 2023-07-09 RX ADMIN — ONDANSETRON 4 MG: 4 TABLET, ORALLY DISINTEGRATING ORAL at 14:04

## 2023-07-09 RX ADMIN — PANCRELIPASE 12000 UNITS: 30000; 6000; 19000 CAPSULE, DELAYED RELEASE PELLETS ORAL at 17:46

## 2023-07-09 RX ADMIN — PANCRELIPASE 12000 UNITS: 30000; 6000; 19000 CAPSULE, DELAYED RELEASE PELLETS ORAL at 14:02

## 2023-07-09 ASSESSMENT — PAIN DESCRIPTION - PAIN TYPE
TYPE: ACUTE PAIN
TYPE: ACUTE PAIN

## 2023-07-09 ASSESSMENT — ENCOUNTER SYMPTOMS
VOMITING: 0
CHILLS: 0
DIZZINESS: 0
WEIGHT LOSS: 1
FALLS: 0
ABDOMINAL PAIN: 1
FOCAL WEAKNESS: 0
COUGH: 0
DIARRHEA: 1
NAUSEA: 1
EYE PAIN: 0
PALPITATIONS: 0
SENSORY CHANGE: 0
FEVER: 0
BLURRED VISION: 0
HEADACHES: 0
INSOMNIA: 0
BLOOD IN STOOL: 0
BACK PAIN: 0
SHORTNESS OF BREATH: 0

## 2023-07-09 ASSESSMENT — LIFESTYLE VARIABLES: SUBSTANCE_ABUSE: 0

## 2023-07-09 NOTE — PROGRESS NOTES
[facility] Medicine Daily Progress Note    Date of Service  7/9/2023    Chief Complaint  Kathy Dumont is a 67 y.o. female admitted 7/6/2023 with diarrhea, abdominal cramping.    Hospital Course  Kathy Dumont is a 67 y.o. female who presented 7/6/2023 with abdominal pain.     Patient has a history of anorexia nervosa, possible celiac disease, gastroparesis, idiopathic recurrent pancreatitis. For the last 4 to 5 years she has been very thin, with a BMI of about 10.  Per her GI doctors, she has a gastric outlet obstruction resulting in severe gastroparesis.  A Sinai-en-Y gastric bypass was performed to give her a completely different opening to her stomach, allowing her to eat and drink to regain her body weight.  Since her Sinai-en-Y gastric bypass, she began to have recent worsening abdominal pain with multiple episodes of diarrhea and weight loss, resulting in poor oral intake and fatigue.     Patient had a CAT scan outpatient and results today showing a possible foci of bowel perforation to which she was referred to the ER immediately.     In the ED, patient found to have borderline hypotension.  Pertinent labs include transaminitis, hypoproteinemia.  General surgery has been contacted, they do not believe that this is a bowel perforation and can be managed conservatively.    Interval Problem Update  No acute events overnight.  Patient reports improved diarrhea yesterday, only used imodium once.  BP remains low, patient with some orthostasis this morning.  Continue IV fluids.  IV access lost, midline ordered as unable to find peripheral access.  Continue modified diet, high protein, avoid simple sugars.  Continue imodium prn, continue creon TID.  Anticipate possible discharge home in next 24-48 hours if symptoms continue to improve.      I have discussed this patient's plan of care and discharge plan at IDT rounds today with Case Management, Nursing, Nursing leadership, and other members  of the IDT team.    Consultants/Specialty  Surgical oncology    Code Status  Full Code    Disposition  The patient is not medically cleared for discharge to home or a post-acute facility.  Anticipate discharge to: home with close outpatient follow-up    I have placed the appropriate orders for post-discharge needs.    Review of Systems  Review of Systems   Constitutional:  Positive for weight loss. Negative for chills and fever.   Eyes:  Negative for blurred vision and pain.   Respiratory:  Negative for cough and shortness of breath.    Cardiovascular:  Negative for chest pain, palpitations and leg swelling.   Gastrointestinal:  Positive for abdominal pain, diarrhea and nausea. Negative for blood in stool, melena and vomiting.   Genitourinary:  Negative for dysuria and urgency.   Musculoskeletal:  Negative for back pain and falls.   Skin:  Negative for itching and rash.   Neurological:  Negative for dizziness, sensory change, focal weakness and headaches.   Psychiatric/Behavioral:  Negative for substance abuse. The patient does not have insomnia.         Physical Exam  Temp:  [36.4 °C (97.5 °F)-37.1 °C (98.8 °F)] 36.6 °C (97.8 °F)  Pulse:  [60-98] 65  Resp:  [16-22] 17  BP: ()/(54-66) 87/54  SpO2:  [92 %-100 %] 92 %    Physical Exam  Vitals reviewed.   Constitutional:       General: She is not in acute distress.     Appearance: She is not diaphoretic.   HENT:      Head: Normocephalic and atraumatic.      Right Ear: External ear normal.      Left Ear: External ear normal.      Nose: Nose normal. No congestion.      Mouth/Throat:      Pharynx: No oropharyngeal exudate or posterior oropharyngeal erythema.   Eyes:      Extraocular Movements: Extraocular movements intact.      Pupils: Pupils are equal, round, and reactive to light.   Cardiovascular:      Rate and Rhythm: Normal rate and regular rhythm.   Pulmonary:      Effort: Pulmonary effort is normal. No respiratory distress.      Breath sounds: Normal breath  sounds. No wheezing.   Abdominal:      General: Bowel sounds are normal. There is no distension.      Palpations: Abdomen is soft.      Tenderness: There is abdominal tenderness. There is no guarding or rebound.   Musculoskeletal:         General: No swelling. Normal range of motion.      Cervical back: Normal range of motion and neck supple.      Right lower leg: No edema.      Left lower leg: No edema.   Skin:     General: Skin is warm and dry.   Neurological:      General: No focal deficit present.      Mental Status: She is alert and oriented to person, place, and time.      Cranial Nerves: No cranial nerve deficit.      Sensory: No sensory deficit.      Motor: No weakness.   Psychiatric:         Mood and Affect: Mood normal.         Behavior: Behavior normal.         Fluids    Intake/Output Summary (Last 24 hours) at 7/9/2023 1419  Last data filed at 7/9/2023 1000  Gross per 24 hour   Intake 120 ml   Output --   Net 120 ml       Laboratory  Recent Labs     07/06/23  1647   WBC 4.1*   RBC 3.27*   HEMOGLOBIN 10.9*   HEMATOCRIT 31.6*   MCV 96.6   MCH 33.3*   MCHC 34.5   RDW 43.6   PLATELETCT 243   MPV 9.8     Recent Labs     07/06/23  1647 07/08/23  0216 07/09/23  0020   SODIUM 143 134* 140   POTASSIUM 3.9 4.0 3.9   CHLORIDE 105 104 107   CO2 27 23 26   GLUCOSE 85 98 131*   BUN 11 11 6*   CREATININE 0.59 0.58 0.62   CALCIUM 9.4 8.5 8.4*             Recent Labs     07/08/23  0216   TRIGLYCERIDE 50   HDL 62   *       Imaging  IR-MIDLINE CATHETER INSERTION WO GUIDANCE > AGE 3    (Results Pending)          Assessment/Plan  * Complications of gastric bypass surgery- (present on admission)  Assessment & Plan  Spoke with ERP, who consulted general surgery.  Patient had a history of chronic malnutrition leading to cachexia.  She had a recent Sinai-en-Y surgery that is now complicated by abdominal pain, diarrhea, decreased oral intake, generalized weakness, weight loss.  She had a CAT scan outpatient today of her  abdomen that showed a possible bowel perforation to which general surgery at [facility] does not think it is a bowel perforation.  Suspect dumping syndrome in setting of gastric bypass surgery two months ago  Discussed diet modification with high protein and avoidance of simple sugars  Start creon for possible pancreatic exocrine dysfunction  Imodium prn  Can stop c diff precautions  IV fluids for hydration  Can consider TPN in a few days if symptoms are not improving    Pancreatic insufficiency  Assessment & Plan  Hx recurrent pancreatitis, reports of gallstones  Check lipid levels  Start creon due to report of greasy stools    ACP (advance care planning)  Assessment & Plan  I discussed advance care planning with the patient for at least 16 minutes, including diagnosis, prognosis, plan of care, risks and benefits of any therapies that could be offered, as well as alternatives including palliation and hospice, as appropriate.     Full code     Hypothyroidism- (present on admission)  Assessment & Plan  Continue synthroid         VTE prophylaxis: SCDs/TEDs

## 2023-07-10 ENCOUNTER — APPOINTMENT (OUTPATIENT)
Dept: RADIOLOGY | Facility: MEDICAL CENTER | Age: 68
DRG: 393 | End: 2023-07-10
Attending: STUDENT IN AN ORGANIZED HEALTH CARE EDUCATION/TRAINING PROGRAM
Payer: MEDICARE

## 2023-07-10 LAB
ANION GAP SERPL CALC-SCNC: 7 MMOL/L (ref 7–16)
BUN SERPL-MCNC: 4 MG/DL (ref 8–22)
CALCIUM SERPL-MCNC: 8.7 MG/DL (ref 8.5–10.5)
CHLORIDE SERPL-SCNC: 105 MMOL/L (ref 96–112)
CO2 SERPL-SCNC: 26 MMOL/L (ref 20–33)
CREAT SERPL-MCNC: 0.51 MG/DL (ref 0.5–1.4)
GFR SERPLBLD CREATININE-BSD FMLA CKD-EPI: 102 ML/MIN/1.73 M 2
GLUCOSE SERPL-MCNC: 92 MG/DL (ref 65–99)
POTASSIUM SERPL-SCNC: 4 MMOL/L (ref 3.6–5.5)
SODIUM SERPL-SCNC: 138 MMOL/L (ref 135–145)

## 2023-07-10 PROCEDURE — 74177 CT ABD & PELVIS W/CONTRAST: CPT

## 2023-07-10 PROCEDURE — 80048 BASIC METABOLIC PNL TOTAL CA: CPT

## 2023-07-10 PROCEDURE — 05HY33Z INSERTION OF INFUSION DEVICE INTO UPPER VEIN, PERCUTANEOUS APPROACH: ICD-10-PCS | Performed by: STUDENT IN AN ORGANIZED HEALTH CARE EDUCATION/TRAINING PROGRAM

## 2023-07-10 PROCEDURE — 700101 HCHG RX REV CODE 250: Performed by: STUDENT IN AN ORGANIZED HEALTH CARE EDUCATION/TRAINING PROGRAM

## 2023-07-10 PROCEDURE — 770006 HCHG ROOM/CARE - MED/SURG/GYN SEMI*

## 2023-07-10 PROCEDURE — A9270 NON-COVERED ITEM OR SERVICE: HCPCS | Performed by: STUDENT IN AN ORGANIZED HEALTH CARE EDUCATION/TRAINING PROGRAM

## 2023-07-10 PROCEDURE — C1751 CATH, INF, PER/CENT/MIDLINE: HCPCS

## 2023-07-10 PROCEDURE — 99232 SBSQ HOSP IP/OBS MODERATE 35: CPT | Performed by: STUDENT IN AN ORGANIZED HEALTH CARE EDUCATION/TRAINING PROGRAM

## 2023-07-10 PROCEDURE — 700117 HCHG RX CONTRAST REV CODE 255: Performed by: STUDENT IN AN ORGANIZED HEALTH CARE EDUCATION/TRAINING PROGRAM

## 2023-07-10 PROCEDURE — 700111 HCHG RX REV CODE 636 W/ 250 OVERRIDE (IP): Performed by: STUDENT IN AN ORGANIZED HEALTH CARE EDUCATION/TRAINING PROGRAM

## 2023-07-10 PROCEDURE — 97165 OT EVAL LOW COMPLEX 30 MIN: CPT

## 2023-07-10 PROCEDURE — 36415 COLL VENOUS BLD VENIPUNCTURE: CPT

## 2023-07-10 PROCEDURE — 700102 HCHG RX REV CODE 250 W/ 637 OVERRIDE(OP): Performed by: STUDENT IN AN ORGANIZED HEALTH CARE EDUCATION/TRAINING PROGRAM

## 2023-07-10 RX ADMIN — THERA TABS 1 TABLET: TAB at 06:09

## 2023-07-10 RX ADMIN — PANCRELIPASE 12000 UNITS: 30000; 6000; 19000 CAPSULE, DELAYED RELEASE PELLETS ORAL at 09:14

## 2023-07-10 RX ADMIN — PANCRELIPASE 12000 UNITS: 30000; 6000; 19000 CAPSULE, DELAYED RELEASE PELLETS ORAL at 13:48

## 2023-07-10 RX ADMIN — IOHEXOL 25 ML: 240 INJECTION, SOLUTION INTRATHECAL; INTRAVASCULAR; INTRAVENOUS; ORAL at 13:45

## 2023-07-10 RX ADMIN — OXYCODONE HYDROCHLORIDE 5 MG: 5 TABLET ORAL at 21:07

## 2023-07-10 RX ADMIN — POTASSIUM CHLORIDE, DEXTROSE MONOHYDRATE AND SODIUM CHLORIDE: 150; 5; 900 INJECTION, SOLUTION INTRAVENOUS at 09:32

## 2023-07-10 RX ADMIN — PANCRELIPASE 12000 UNITS: 30000; 6000; 19000 CAPSULE, DELAYED RELEASE PELLETS ORAL at 18:14

## 2023-07-10 RX ADMIN — IOHEXOL 100 ML: 350 INJECTION, SOLUTION INTRAVENOUS at 13:27

## 2023-07-10 RX ADMIN — ENOXAPARIN SODIUM 30 MG: 100 INJECTION SUBCUTANEOUS at 18:14

## 2023-07-10 RX ADMIN — OXYCODONE HYDROCHLORIDE 5 MG: 5 TABLET ORAL at 06:13

## 2023-07-10 RX ADMIN — LEVOTHYROXINE SODIUM 50 MCG: 0.05 TABLET ORAL at 06:09

## 2023-07-10 ASSESSMENT — COGNITIVE AND FUNCTIONAL STATUS - GENERAL
SUGGESTED CMS G CODE MODIFIER DAILY ACTIVITY: CH
DAILY ACTIVITIY SCORE: 24

## 2023-07-10 ASSESSMENT — LIFESTYLE VARIABLES: SUBSTANCE_ABUSE: 0

## 2023-07-10 ASSESSMENT — ENCOUNTER SYMPTOMS
WEIGHT LOSS: 1
FOCAL WEAKNESS: 0
BLOOD IN STOOL: 0
DIARRHEA: 1
INSOMNIA: 0
CHILLS: 0
COUGH: 0
NAUSEA: 1
FALLS: 0
PALPITATIONS: 0
BACK PAIN: 0
VOMITING: 0
FEVER: 0
SHORTNESS OF BREATH: 0
BLURRED VISION: 0
ABDOMINAL PAIN: 1
DIZZINESS: 0
SENSORY CHANGE: 0
HEADACHES: 0
EYE PAIN: 0

## 2023-07-10 ASSESSMENT — ACTIVITIES OF DAILY LIVING (ADL): TOILETING: INDEPENDENT

## 2023-07-10 ASSESSMENT — PAIN DESCRIPTION - PAIN TYPE: TYPE: ACUTE PAIN

## 2023-07-10 NOTE — THERAPY
"Occupational Therapy   Initial Evaluation     Patient Name: Kathy Dumont  Age:  67 y.o., Sex:  female  Medical Record #: [id number]  Today's Date: 7/10/2023       Precautions: Fall Risk  Comments: low BMI    Assessment  Patient is 67 y.o. female with a diagnosis of abdominal pain, diarrhea & weight loss & fatigue.  Patient has a history of anorexia nervosa, possible celiac disease, gastroparesis, idiopathic recurrent pancreatitis. For the last 4 to 5 years she has been very thin, with a BMI of about 10.  Per her GI doctors, she has a gastric outlet obstruction resulting in severe gastroparesis.  A Sinai-en-Y gastric bypass was performed 5/5/23 to give her a completely different opening to her stomach, allowing her to eat and drink to regain her body weight. Today pt is able to complete basic ADL's & functional mobility with supervision.  Pt does have limited activity tolerance & reports minimal food/drink consumption.  Pt stated she typically is able to manage to go grocery shopping & manage the housecleaning.  Pt encouraged to utilize energy conservation techniques throughout the day to complete ADL's.  Pt reports her  can assist if needed.    Patient will not be actively followed for occupational therapy services at this time, however may be seen if requested by physician for 1 more visit within 30 days to address any discharge or equipment needs.      Plan    Occupational Therapy Initial Treatment Plan   Duration: Discharge Needs Only    DC Equipment Recommendations: None  Discharge Recommendations: Anticipate that the patient will have no further occupational therapy needs after discharge from the hospital     Subjective    \"It takes me a couple days to consume 1 Boost\"     Objective      Initial Contact Note    Initial Contact Note Order Received and Verified, Evaluation Only - Patient Does Not Require Further Acute Occupational Therapy at this Time.  However, May Benefit from Post Acute " Therapy for Higher Level Functional Deficits.   Prior Living Situation   Prior Services None   Housing / Facility 1 [address] House   Steps Into Home 3   Bathroom Set up Bathtub / Shower Combination   Equipment Owned None   Lives with - Patient's Self Care Capacity Spouse   Comments pt reports her  works during the day but is able to help in the evenings.   Prior Level of ADL Function   Self Feeding Independent   Grooming / Hygiene Independent   Bathing Independent   Dressing Independent   Toileting Independent   Prior Level of IADL Function   Medication Management Independent   Laundry Independent   Kitchen Mobility Independent   Finances Independent   Home Management Independent   Shopping Independent   Prior Level Of Mobility Independent Without Device in Community   Driving / Transportation Driving Independent   Occupation (Pre-Hospital Vocational) Retired Due To Age   Precautions   Precautions Fall Risk   Comments low BMI   Vitals   O2 Delivery Device None - Room Air   Pain   Pain Scales 0 to 10 Scale    Intervention Ambulation / Increased Activity   Pain 0 - 10 Group   Location Abdomen   Description Tender   Therapist Pain Assessment During Activity;Nurse Notified;2   Cognition    Cognition / Consciousness WDL   Comments pleasant   Passive ROM Upper Body   Passive ROM Upper Body WDL   Active ROM Upper Body   Active ROM Upper Body  WDL   Strength Upper Body   Upper Body Strength  X   Comments pt appears thin & frail with low muscle mass   Sensation Upper Body   Upper Extremity Sensation  WDL   Coordination Upper Body   Coordination WDL   Balance Assessment   Sitting Balance (Static) Good   Sitting Balance (Dynamic) Fair +   Standing Balance (Static) Fair +   Standing Balance (Dynamic) Fair   Weight Shift Sitting Good   Weight Shift Standing Fair   Bed Mobility    Supine to Sit Supervised   Sit to Supine Supervised   Scooting Supervised   Rolling Modified Independent   ADL Assessment   Eating Modified  Independent   Grooming Supervision;Standing   Upper Body Dressing Supervision   Lower Body Dressing Supervision   Toileting Supervision   Functional Mobility   Sit to Stand Supervised   Bed, Chair, Wheelchair Transfer Supervised   Toilet Transfers Supervised   Transfer Method Stand Step   Mobility pt encouraged to amb in halls daily with staff to reduce any muscle weakness from staying in bed   Activity Tolerance   Sitting Edge of Bed 5   Standing 5   Education Group   Education Provided Activities of Daily Living   Role of Occupational Therapist Patient Response Patient;Acceptance;Explanation;Verbal Demonstration   ADL Patient Response Patient;Acceptance;Explanation;Demonstration;Action Demonstration   Occupational Therapy Initial Treatment Plan    Duration Discharge Needs Only   Anticipated Discharge Equipment and Recommendations   DC Equipment Recommendations None   Discharge Recommendations Anticipate that the patient will have no further occupational therapy needs after discharge from the hospital   Interdisciplinary Plan of Care Collaboration   IDT Collaboration with  Nursing   Patient Position at End of Therapy In Bed;Call Light within Reach;Tray Table within Reach;Bed Alarm On   Collaboration Comments Nsg notified of OT findings   Session Information   Date / Session Number  7/10- D/C needs only

## 2023-07-10 NOTE — PROGRESS NOTES
[facility] Medicine Daily Progress Note    Date of Service  7/10/2023    Chief Complaint  Kathy Dumont is a 67 y.o. female admitted 7/6/2023 with diarrhea, abdominal cramping.    Hospital Course  Kathy Dumont is a 67 y.o. female who presented 7/6/2023 with abdominal pain.     Patient has a history of anorexia nervosa, possible celiac disease, gastroparesis, idiopathic recurrent pancreatitis. For the last 4 to 5 years she has been very thin, with a BMI of about 10.  Per her GI doctors, she has a gastric outlet obstruction resulting in severe gastroparesis.  A Sinai-en-Y gastric bypass was performed 5/5/23 to give her a completely different opening to her stomach, allowing her to eat and drink to regain her body weight.  She began to have recent worsening abdominal pain with multiple episodes of diarrhea and weight loss, resulting in poor oral intake and fatigue.     Patient had a CAT scan outpatient and results today showing a possible foci of bowel perforation to which she was referred to the ER immediately.     In the ED, patient found to have borderline hypotension.  Pertinent labs include transaminitis, hypoproteinemia.  General surgery has been contacted, they do not believe that this is a bowel perforation and can be managed conservatively.  Patient likely with dumping syndrome, her diet has been modified to include high protein and no simple sugars. Creon started for possible pancreatic insufficiency. Imodium ordered as needed for diarrhea. Patient with improved diarrhea once modified diet started. She has required IV fluids for dehydration. She is encouraged to keep up her oral intake to stay hydrated.    Interval Problem Update  No acute events overnight.  Patient reports no diarrhea yesterday, however she is having continued abdominal pain with some radiation to back.  CT abdomen ordered to evaluate ongoing pain.  Continue IV fluids for today, end time ordered.  Continue modified  diet, high protein, avoid simple sugars.  Continue imodium prn, continue creon TID.  Anticipate possible discharge home tomorrow if symptoms continue to improve and CT abdomen is reassuring.  Do not foresee patient needing TPN as she is eating and stooling well.      I have discussed this patient's plan of care and discharge plan at IDT rounds today with Case Management, Nursing, Nursing leadership, and other members of the IDT team.    Consultants/Specialty  Surgical oncology    Code Status  Full Code    Disposition  The patient is not medically cleared for discharge to home or a post-acute facility.  Anticipate discharge to: home with close outpatient follow-up    I have placed the appropriate orders for post-discharge needs.    Review of Systems  Review of Systems   Constitutional:  Positive for weight loss. Negative for chills and fever.   Eyes:  Negative for blurred vision and pain.   Respiratory:  Negative for cough and shortness of breath.    Cardiovascular:  Negative for chest pain, palpitations and leg swelling.   Gastrointestinal:  Positive for abdominal pain, diarrhea and nausea. Negative for blood in stool, melena and vomiting.   Genitourinary:  Negative for dysuria and urgency.   Musculoskeletal:  Negative for back pain and falls.   Skin:  Negative for itching and rash.   Neurological:  Negative for dizziness, sensory change, focal weakness and headaches.   Psychiatric/Behavioral:  Negative for substance abuse. The patient does not have insomnia.         Physical Exam  Temp:  [36.4 °C (97.6 °F)-36.9 °C (98.4 °F)] 36.6 °C (97.8 °F)  Pulse:  [62-72] 63  Resp:  [15-17] 17  BP: (95-99)/(53-62) 95/54  SpO2:  [93 %-98 %] 98 %    Physical Exam  Vitals reviewed.   Constitutional:       General: She is not in acute distress.     Appearance: She is not diaphoretic.   HENT:      Head: Normocephalic and atraumatic.      Right Ear: External ear normal.      Left Ear: External ear normal.      Nose: Nose normal. No  congestion.      Mouth/Throat:      Pharynx: No oropharyngeal exudate or posterior oropharyngeal erythema.   Eyes:      Extraocular Movements: Extraocular movements intact.      Pupils: Pupils are equal, round, and reactive to light.   Cardiovascular:      Rate and Rhythm: Normal rate and regular rhythm.   Pulmonary:      Effort: Pulmonary effort is normal. No respiratory distress.      Breath sounds: Normal breath sounds. No wheezing.   Abdominal:      General: Bowel sounds are normal. There is no distension.      Palpations: Abdomen is soft.      Tenderness: There is abdominal tenderness. There is no guarding or rebound.   Musculoskeletal:         General: No swelling. Normal range of motion.      Cervical back: Normal range of motion and neck supple.      Right lower leg: No edema.      Left lower leg: No edema.   Skin:     General: Skin is warm and dry.   Neurological:      General: No focal deficit present.      Mental Status: She is alert and oriented to person, place, and time.      Cranial Nerves: No cranial nerve deficit.      Sensory: No sensory deficit.      Motor: No weakness.   Psychiatric:         Mood and Affect: Mood normal.         Behavior: Behavior normal.         Fluids    Intake/Output Summary (Last 24 hours) at 7/10/2023 1239  Last data filed at 7/10/2023 1030  Gross per 24 hour   Intake 300 ml   Output --   Net 300 ml       Laboratory        Recent Labs     07/08/23  0216 07/09/23  0020 07/10/23  0009   SODIUM 134* 140 138   POTASSIUM 4.0 3.9 4.0   CHLORIDE 104 107 105   CO2 23 26 26   GLUCOSE 98 131* 92   BUN 11 6* 4*   CREATININE 0.58 0.62 0.51   CALCIUM 8.5 8.4* 8.7             Recent Labs     07/08/23  0216   TRIGLYCERIDE 50   HDL 62   *       Imaging  IR-MIDLINE CATHETER INSERTION WO GUIDANCE > AGE 3   Final Result                  Ultrasound-guided midline placement performed by qualified nursing staff    as above.          CT-ABDOMEN-PELVIS WITH    (Results Pending)           Assessment/Plan  * Complications of gastric bypass surgery- (present on admission)  Assessment & Plan  Spoke with ERP, who consulted general surgery.  Patient had a history of chronic malnutrition leading to cachexia.  She had a recent Sinai-en-Y surgery that is now complicated by abdominal pain, diarrhea, decreased oral intake, generalized weakness, weight loss.  She had a CAT scan outpatient of her abdomen that showed a possible bowel perforation, presented to the ER  Surgery consulted, do not believe she has perforation  Suspect dumping syndrome in setting of gastric bypass surgery two months ago  Discussed diet modification with high protein and avoidance of simple sugars  Start creon for possible pancreatic exocrine dysfunction  Imodium prn  Improving, defer TPN as patient eating well with improved diarrhea  Repeat CT scan today to evaluate ongoing abdominal pain    Pancreatic insufficiency  Assessment & Plan  Hx recurrent pancreatitis, reports of gallstones  Trigs normal  Start creon due to report of greasy stools    ACP (advance care planning)  Assessment & Plan  I discussed advance care planning with the patient for at least 16 minutes, including diagnosis, prognosis, plan of care, risks and benefits of any therapies that could be offered, as well as alternatives including palliation and hospice, as appropriate.     Full code     Hypothyroidism- (present on admission)  Assessment & Plan  TSH normal  Continue synthroid         VTE prophylaxis: SCDs/TEDs

## 2023-07-10 NOTE — CARE PLAN
Assumed care of pt after receiving report from night shift RN. Pt is A&Ox 4, VSS on RA. Pt c/o 3/10 pain in abdomen/back - declining interventions at this time and states 3/10 is a tolerable pain level for her. Updated pt on POC - pt verbalizes understanding. Bed is locked and in lowest position, call light within reach, fall precautions in place. All needs met at this time.     Educated pt on need to drink oral contrast - pt verbalized understanding. CT scheduled for 1330.    The patient is Stable - Low risk of patient condition declining or worsening    Shift Goals  Clinical Goals: Pt will eat >50% of all meals this shift  Patient Goals: nutrition  Family Goals: NA    Progress made toward(s) clinical / shift goals: Pt educated and encouraged to increase oral intake including supplements.     Problem: Pain - Standard  Goal: Alleviation of pain or a reduction in pain to the patient’s comfort goal  Outcome: Progressing     Problem: Knowledge Deficit - Standard  Goal: Patient and family/care givers will demonstrate understanding of plan of care, disease process/condition, diagnostic tests and medications  Outcome: Progressing     Problem: Fall Risk  Goal: Patient will remain free from falls  Outcome: Progressing       Patient is not progressing towards the following goals: Pt not tolerating >50% of meals

## 2023-07-10 NOTE — CARE PLAN
The patient is   Problem: Pain - Standard  Goal: Alleviation of pain or a reduction in pain to the patient’s comfort goal  Description: Target End Date:  Prior to discharge or change in level of care    Document on Vitals flowsheet    1.  Document pain using the appropriate pain scale per order or unit policy  2.  Educate and implement non-pharmacologic comfort measures (i.e. relaxation, distraction, massage, cold/heat therapy, etc.)  3.  Pain management medications as ordered  4.  Reassess pain after pain med administration per policy  5.  If opiods administered assess patient's response to pain medication is appropriate per POSS sedation scale  6.  Follow pain management plan developed in collaboration with patient and interdisciplinary team (including palliative care or pain specialists if applicable)  Outcome: Progressing  Note: PRN Pain medication given this shift. Patient maintained 8/10 pain through out the shift        Problem: Fall Risk  Goal: Patient will remain free from falls  Description: Target End Date:  Prior to discharge or change in level of care    Document interventions on the [employer] Fall Risk Assessment    1.  Assess for fall risk factors  2.  Implement fall precautions  Outcome: Progressing       Shift Goals  Clinical Goals: increase nutrition and decrease N/V this shift  Patient Goals: nutrition  Family Goals: NA    Progress made toward(s) clinical / shift goals:    Problem: Fall Risk  Goal: Patient will remain free from falls  Description: Target End Date:  Prior to discharge or change in level of care    Document interventions on the [employer] Fall Risk Assessment    1.  Assess for fall risk factors  2.  Implement fall precautions  Outcome: Progressing     Problem: Pain - Standard  Goal: Alleviation of pain or a reduction in pain to the patient’s comfort goal  Description: Target End Date:  Prior to discharge or change in level of care    Document on Vitals flowsheet    1.  Document  pain using the appropriate pain scale per order or unit policy  2.  Educate and implement non-pharmacologic comfort measures (i.e. relaxation, distraction, massage, cold/heat therapy, etc.)  3.  Pain management medications as ordered  4.  Reassess pain after pain med administration per policy  5.  If opiods administered assess patient's response to pain medication is appropriate per POSS sedation scale  6.  Follow pain management plan developed in collaboration with patient and interdisciplinary team (including palliative care or pain specialists if applicable)  Outcome: Progressing  Note: PRN Pain medication given this shift. Patient maintained 8/10 pain through out the shift        Problem: Fall Risk  Goal: Patient will remain free from falls  Description: Target End Date:  Prior to discharge or change in level of care    Document interventions on the [employer] Im[employer] Fall Risk Assessment    1.  Assess for fall risk factors  2.  Implement fall precautions  Outcome: Progressing       Patient is not progressing towards the following goals:

## 2023-07-10 NOTE — PROCEDURES
Vascular Access Team    Date of Insertion: 7/10/2023  Arm Circumference: n/a  Line Length: 10  Line Size: 20  Vein Occupancy %: 45  Reason for Midline: access  Labs: WBC 4.1, , BUN 4, Cr 0.51, , INR n/a    Orders confirmed, vessel patency confirmed with ultrasound. Risks and benefits of procedure explained to patient and education regarding line associated bloodstream infections provided. Questions answered.     PowerGlide Midline placed in RUE per licensed provider order with ultrasound guidance. 20g, 10 cm line placed in brachial vein after 1 attempt(s).  Catheter inserted with brisk blood return. Secured with 0cm external from insertion site.  Line flushed without resistance with 10 mL 0.9% normal saline.  Midline secured with Biopatch and Tegaderm.     Midline placement is confirmed by nurse using ultrasound and ability to flush and draw blood. Midline is appropriate for use at this time.  No X-ray is needed for placement confirmation. Pt tolerated procedure well.  Patient condition relayed to unit RN or ordering physician via this post procedure note in the EMR.    Ultrasound images uploaded to PACS and viewable in the EMR - yes  Ultrasound imaged printed and placed in paper chart - no      BARD PowerGlide Midline ref # [id number], Lot # [id number], Expiration Date 09-

## 2023-07-10 NOTE — HOSPITAL COURSE
This is a 67-year-old female with past medical history of gastric outlet obstruction resulting in gastroparesis s/p Sinai-en-Y gastric bypass, hypothyroidism and recurrent idiopathic pancreatitis who was admitted on 7/6/2023 with abdominal pain, poor oral intake and diarrhea.    Patient reports she had worsening abdominal pain and poor oral intake since her Sinai-en-Y gastric bypass.  Patient had an outpatient CT imaging which noted possible bowel perforation.  General surgery was consulted, recommended conservative management as he did not believe this is a true bowel perforation.  Concern for possible dumping syndrome, patient's diet was modified, she was started on IV hydration and conservative management for her nausea, vomiting and diarrhea.    On day of discharge, patient's nausea, vomiting and diarrhea has decided x48 hours.  She is tolerating oral intake.

## 2023-07-10 NOTE — DIETARY
Nutrition Update:    Day 4 of admit. Kathy Dumont is a 67 y.o. female with admitting DX of Complications of gastric bypass surgery [K91.89, Y83.2].  Patient being followed to optimize nutrition.    MSJ x 1.3 = 1056 kcal/day estimated needs    Current Diet: Regular / high protein mod / gluten free / boost plus TID. PO in ADLs of <25% x 2 for 7/9 and % for breakfast this a.m.  In a day, pt would be able to eat 50% of meals alone or 100% of supplements alone and meet estimated caloric needs. Spoke w/ MD over Voalte who stated pt appears to be doing well w/ PO intake and may d/c home tomorrow. For these reasons, there are no plans to initiate TPN at this point.    Problem: Nutritional:  Goal: Achieve adequate nutritional intake  Description: Patient will consume ~ 50% of meals  Outcome: progressing slowly    Per refeeding risk presented in last RD note on 7/7, pt's K+ WNL, and phosphorous and magnesium also WNL but have not been taken since 7/8. Per MAR, pt still taking daily multivitamin.    Recommendations/Plans:  Encourage intake of ~50% of meals  Document intake of all meals and supplements as % taken in ADLs to provide interdisciplinary communication across all shifts.  Monitor weight.  Nutrition rep will continue to see pt for ongoing meal and snack preferences.       RD following

## 2023-07-11 ENCOUNTER — PHARMACY VISIT (OUTPATIENT)
Dept: PHARMACY | Facility: MEDICAL CENTER | Age: 68
End: 2023-07-11
Payer: COMMERCIAL

## 2023-07-11 VITALS
OXYGEN SATURATION: 97 % | HEART RATE: 55 BPM | RESPIRATION RATE: 16 BRPM | SYSTOLIC BLOOD PRESSURE: 88 MMHG | WEIGHT: 67.9 LBS | TEMPERATURE: 97.1 F | HEIGHT: 63 IN | DIASTOLIC BLOOD PRESSURE: 52 MMHG | BODY MASS INDEX: 12.03 KG/M2

## 2023-07-11 LAB
ANION GAP SERPL CALC-SCNC: 8 MMOL/L (ref 7–16)
BUN SERPL-MCNC: 4 MG/DL (ref 8–22)
CALCIUM SERPL-MCNC: 8.8 MG/DL (ref 8.5–10.5)
CHLORIDE SERPL-SCNC: 103 MMOL/L (ref 96–112)
CO2 SERPL-SCNC: 26 MMOL/L (ref 20–33)
CREAT SERPL-MCNC: 0.46 MG/DL (ref 0.5–1.4)
ERYTHROCYTE [DISTWIDTH] IN BLOOD BY AUTOMATED COUNT: 42.1 FL (ref 35.9–50)
GFR SERPLBLD CREATININE-BSD FMLA CKD-EPI: 104 ML/MIN/1.73 M 2
GLUCOSE SERPL-MCNC: 90 MG/DL (ref 65–99)
HCT VFR BLD AUTO: 30.3 % (ref 37–47)
HGB BLD-MCNC: 10.4 G/DL (ref 12–16)
MAGNESIUM SERPL-MCNC: 1.5 MG/DL (ref 1.5–2.5)
MCH RBC QN AUTO: 32.7 PG (ref 27–33)
MCHC RBC AUTO-ENTMCNC: 34.3 G/DL (ref 32.2–35.5)
MCV RBC AUTO: 95.3 FL (ref 81.4–97.8)
PHOSPHATE SERPL-MCNC: 2.9 MG/DL (ref 2.5–4.5)
PLATELET # BLD AUTO: 162 K/UL (ref 164–446)
PMV BLD AUTO: 9.9 FL (ref 9–12.9)
POTASSIUM SERPL-SCNC: 3.8 MMOL/L (ref 3.6–5.5)
RBC # BLD AUTO: 3.18 M/UL (ref 4.2–5.4)
SODIUM SERPL-SCNC: 137 MMOL/L (ref 135–145)
WBC # BLD AUTO: 3.7 K/UL (ref 4.8–10.8)

## 2023-07-11 PROCEDURE — 83735 ASSAY OF MAGNESIUM: CPT

## 2023-07-11 PROCEDURE — A9270 NON-COVERED ITEM OR SERVICE: HCPCS | Performed by: STUDENT IN AN ORGANIZED HEALTH CARE EDUCATION/TRAINING PROGRAM

## 2023-07-11 PROCEDURE — RXMED WILLOW AMBULATORY MEDICATION CHARGE: Performed by: GENERAL PRACTICE

## 2023-07-11 PROCEDURE — 700102 HCHG RX REV CODE 250 W/ 637 OVERRIDE(OP): Performed by: STUDENT IN AN ORGANIZED HEALTH CARE EDUCATION/TRAINING PROGRAM

## 2023-07-11 PROCEDURE — 85027 COMPLETE CBC AUTOMATED: CPT

## 2023-07-11 PROCEDURE — 80048 BASIC METABOLIC PNL TOTAL CA: CPT

## 2023-07-11 PROCEDURE — 99239 HOSP IP/OBS DSCHRG MGMT >30: CPT | Performed by: GENERAL PRACTICE

## 2023-07-11 PROCEDURE — 84100 ASSAY OF PHOSPHORUS: CPT

## 2023-07-11 PROCEDURE — 36415 COLL VENOUS BLD VENIPUNCTURE: CPT

## 2023-07-11 RX ORDER — OXYCODONE HYDROCHLORIDE 5 MG/1
5 TABLET ORAL EVERY 8 HOURS PRN
Qty: 9 TABLET | Refills: 0 | Status: SHIPPED | OUTPATIENT
Start: 2023-07-11 | End: 2023-07-11 | Stop reason: SDUPTHER

## 2023-07-11 RX ORDER — OXYCODONE HYDROCHLORIDE 5 MG/1
5 TABLET ORAL EVERY 8 HOURS PRN
Qty: 9 TABLET | Refills: 0 | Status: SHIPPED | OUTPATIENT
Start: 2023-07-11 | End: 2023-07-14

## 2023-07-11 RX ADMIN — LEVOTHYROXINE SODIUM 50 MCG: 0.05 TABLET ORAL at 04:43

## 2023-07-11 RX ADMIN — PANCRELIPASE 12000 UNITS: 30000; 6000; 19000 CAPSULE, DELAYED RELEASE PELLETS ORAL at 09:00

## 2023-07-11 RX ADMIN — OXYCODONE HYDROCHLORIDE 5 MG: 5 TABLET ORAL at 04:43

## 2023-07-11 RX ADMIN — THERA TABS 1 TABLET: TAB at 04:43

## 2023-07-11 ASSESSMENT — PAIN DESCRIPTION - PAIN TYPE: TYPE: ACUTE PAIN

## 2023-07-11 NOTE — DIETARY
Nutrition Services: Verbal Nutrition Education Consult   Day 5 of admit.  Kathy Dumont is a 67 y.o. female with admitting DX of Complications of gastric bypass surgery [K91.89, Y83.2]    RN asked RD to provide nutrition education for high protein foods prior to pt discharging today. RD able to visit pt at bedside to provide nutrition education. RD discussed nutrition tips for high protein foods, provided handout reinforcing topics discussed. Pt demonstrated readiness and evidence of learning. RD able to answer all questions to patient's satisfaction.     No other education needs identified at this time. Consider referral to outpatient nutrition services for continuation of education as indicated or per pt preferences.     Please re-consult RD as indicated.

## 2023-07-11 NOTE — PROGRESS NOTES
Alert and able to let her needs known, poor PO intake; encouraged to drink boost. Sacral area red , mepilex in place and encouraged to turn, ANITA in place.  C/o abdominal pain; medicate as requested

## 2023-07-11 NOTE — DISCHARGE INSTRUCTIONS
Discharge Instructions    Discharged to home by car with relative. Discharged via wheelchair, hospital escort: Yes.  Special equipment needed: Not Applicable    Be sure to schedule a follow-up appointment with your primary care doctor or any specialists as instructed.     Discharge Plan:   Diet Plan: Discussed  Activity Level: Discussed  Confirmed Follow up Appointment: Patient to Call and Schedule Appointment  Confirmed Symptoms Management: Discussed  Medication Reconciliation Updated: Yes    I understand that a diet low in cholesterol, fat, and sodium is recommended for good health. Unless I have been given specific instructions below for another diet, I accept this instruction as my diet prescription.   Other diet: high protein high calorie     Special Instructions: None    -Is this patient being discharged with medication to prevent blood clots?  No    Is patient discharged on Warfarin / Coumadin?   No

## 2023-07-11 NOTE — CARE PLAN
The patient is Watcher - Medium risk of patient condition declining or worsening    Shift Goals  Clinical Goals: patient will be able to eat without pain by end of shift  Patient Goals: feel better  Family Goals: come home today    Progress made toward(s) clinical / shift goals:  Patient able to eat without pain. No diarrhea, no nausea. Calling appropriately. Awaiting meeting with dietician to discharge.     Patient is not progressing towards the following goals:

## 2023-07-11 NOTE — DISCHARGE SUMMARY
Discharge Summary    CHIEF COMPLAINT ON ADMISSION  Chief Complaint   Patient presents with    Abdominal Pain    Diarrhea     Pt sent by MD. Pt found to have a bowel perforation by CT. Pt states she has been having abd pain/diarrhea/nausea. Pt states she recently had Abd surgery in May.     Hypotension       Reason for Admission  Sent by md     Admission Date  7/6/2023    CODE STATUS  Prior    HPI & HOSPITAL COURSE  This is a 67-year-old female with past medical history of gastric outlet obstruction resulting in gastroparesis s/p Sinai-en-Y gastric bypass, hypothyroidism and recurrent idiopathic pancreatitis who was admitted on 7/6/2023 with abdominal pain, poor oral intake and diarrhea.    Patient reports she had worsening abdominal pain and poor oral intake since her Sinai-en-Y gastric bypass.  Patient had an outpatient CT imaging which noted possible bowel perforation.  General surgery was consulted, recommended conservative management as he did not believe this is a true bowel perforation.  Concern for possible dumping syndrome, patient's diet was modified, she was started on IV hydration and conservative management for her nausea, vomiting and diarrhea.    On day of discharge, patient's nausea, vomiting and diarrhea has decided x48 hours.  She is tolerating oral intake.    Therefore, she is discharged in good and stable condition to home with close outpatient follow-up.    The patient met 2-midnight criteria for an inpatient stay at the time of discharge.    Discharge Date  7/11/2023    FOLLOW UP ITEMS POST DISCHARGE  Primary care physician    DISCHARGE DIAGNOSES  Principal Problem:    Complications of gastric bypass surgery (POA: Yes)  Active Problems:    Hypothyroidism (POA: Yes)    ACP (advance care planning) (POA: Unknown)    Pancreatic insufficiency (POA: Unknown)  Resolved Problems:    Failure to thrive in adult (POA: Unknown)      FOLLOW UP  Marysol Thurman    Schedule an appointment as soon as possible for a  "visit  follow up with your PCP within 2 weeks    Bradly Cisneros M.D.  [address] [facility] [address]  [place] [address]  [phone]    Go on 8/8/2023  9:30 AM      MEDICATIONS ON DISCHARGE     Medication List        START taking these medications        Instructions   * Creon [id number] units Cpep  Generic drug: pancrelipase (Lip-Prot-Amyl)   Take 2 Capsules by mouth 3 times a day with meals for 30 days.  Dose: 2 Capsule     * pancrelipase (Lip-Prot-Amyl) [id number] units Cpep  Commonly known as: Creon 6000   Take 2 Capsules by mouth 3 times a day with meals for 30 days.  Dose: 2 Capsule     oxyCODONE immediate-release 5 MG Tabs  Commonly known as: Roxicodone   Take 1 Tablet by mouth every 8 hours as needed for Severe Pain for up to 3 days.  Dose: 5 mg           * This list has 2 medication(s) that are the same as other medications prescribed for you. Read the directions carefully, and ask your doctor or other care provider to review them with you.                CONTINUE taking these medications        Instructions   CALCIUM PO   Take 1 Tablet by mouth every day.  Dose: 1 Tablet     levothyroxine 50 MCG Tabs  Commonly known as: Synthroid   Take 50 mcg by mouth every morning on an empty stomach.  Dose: 50 mcg     multivitamin Tabs   Take 1 Tablet by mouth every day.  Dose: 1 Tablet     ondansetron 4 MG Tabs tablet  Commonly known as: Zofran   Take 4 mg by mouth every 8 hours as needed for Nausea/Vomiting.  Dose: 4 mg            STOP taking these medications      HYDROcodone-acetaminophen 5-325 MG Tabs per tablet  Commonly known as: Norco              Allergies  Allergies   Allergen Reactions    Hydrocodone-Acetaminophen Rash     Rash, \"I can tolerate in small amounts\"      Loratadine-Pseudoephedrine Rash     rash    Sertraline Hcl Palpitations    Tramadol Vomiting     Fainting    Zoloft Rash     rash    Codeine Vomiting    Gluten Meal      \"violent stomach upset for 4-5 days\"       DIET  No orders of the " defined types were placed in this encounter.      ACTIVITY  As tolerated.  Weight bearing as tolerated    CONSULTATIONS  General surgery    PROCEDURES  None    LABORATORY  Lab Results   Component Value Date    SODIUM 137 07/11/2023    POTASSIUM 3.8 07/11/2023    CHLORIDE 103 07/11/2023    CO2 26 07/11/2023    GLUCOSE 90 07/11/2023    BUN 4 (L) 07/11/2023    CREATININE 0.46 (L) 07/11/2023        Lab Results   Component Value Date    WBC 3.7 (L) 07/11/2023    HEMOGLOBIN 10.4 (L) 07/11/2023    HEMATOCRIT 30.3 (L) 07/11/2023    PLATELETCT 162 (L) 07/11/2023      CT-ABDOMEN-PELVIS WITH   Final Result      1.  Changes of gastric bypass   2.  Prior cholecystectomy with pneumobilia as before   3.  Small BILATERAL pleural effusions, new since recent outside study   4.  Ascites, increased since the recent outside study   5.  RIGHT upper and LEFT lower quadrant abdominal wall gas, possibly related to medication administration or other intervention   6.  BILATERAL L5 pars defects with grade 1 anterolisthesis of L5 on S1            IR-MIDLINE CATHETER INSERTION WO GUIDANCE > AGE 3   Final Result                  Ultrasound-guided midline placement performed by qualified nursing staff    as above.             Total time of the discharge process exceeds 45 minutes.

## 2023-07-13 NOTE — PROGRESS NOTES
Subjective:   8/8/2023  8:52 AM  Primary care physician: Pcp Not In Computer  Referring Provider: Femi Gutierrez M.D.    Chief Complaint:   Chief Complaint   Patient presents with    Other     MALNUTRITION  R-EN-Y 5/5/23  ADMIT 7/6-11  FOLLOW UP     Diagnosis:   1. Delayed gastric emptying  Pancrelipase, Lip-Prot-Amyl, (CREON) 07275-955401 units Cap DR Particles      2. History of Sinai-en-Y gastric bypass  Pancrelipase, Lip-Prot-Amyl, (CREON) 44148-902956 units Cap DR Particles      3. Partial gastric outlet obstruction  Pancrelipase, Lip-Prot-Amyl, (CREON) 45771-030221 units Cap DR Particles      4. Anorexia nervosa  Pancrelipase, Lip-Prot-Amyl, (CREON) 02759-189326 units Cap DR Particles      5. Underweight due to inadequate caloric intake  Pancrelipase, Lip-Prot-Amyl, (CREON) 08152-019996 units Cap DR Particles        History of presenting illness:    Kathy Dumont is a pleasant 67 y.o. female with hx of anorexia nervosa, celiac disease(?), idiopathic gastroparesis, and idiopathic recurrent pancreatitis. She has a long GI history and was previously seen at [facility] and at  in [place], [place]. Her latest weight is 29 kg, which is a slight decrease from December. She has previous attempted numerous medications including Reglan, Creon, Linzess, Domporidon. Her diet consists mostly of jello, baby food, ice cream, Boost drinks. She previously tried working with a nurtitionist - but had no clear results.     GASTRIC EMPTYING STUDY on 7/20/21 noted significant retained gastric activity at 4 hours indicating delayed gastric emptying.     EGD on 8/9/21 noted   EGD FINDINGS:  1.  Esophagus:  Unremarkable mucosa.  2.  Long and J-shaped stomach, otherwise unremarkable mucosa.  3.  Mildly stenotic pylorus, successfully dilated to 20 mm with balloon.  4.  Duodenum:  Unremarkable mucosa.  Possible extrinsic compression of the second portion.     ENDOSCOPIC ULTRASOUND FINDINGS:  1.  Celiac axis, no adenopathy.  2.   Pancreatic body and tail, normal parenchyma.  3.  Pancreatic duct, normal course and caliber.  4.  Gallbladder, surgically absent.  5.  Biliary ampulla, unremarkable.  6.  Common bile duct, generous caliber down to the level of the ampulla.  Normal course.  Small subtle nonshadowing filling defect suspicious of stone versus sludge ball.  7.  Head of pancreas, normal parenchyma and normal dorsal ventral transition.     ERCP FINDINGS:  1.  Biliary ampulla, small, but otherwise unremarkable appearance.  Stenotic to wire and cannula passage.  2.  Pancreatic duct, neither injected nor cannulated.  3.  Common bile duct, normal course.  Generous caliber.  Small distal filling defect consistent with small stone.     EGD by Dr Cadet on 4/18/23 noted unremarkable mucosa, and included intersphincteric injection of Botox - 100U divided 4 quadrants. Hydraulic dilation of pylorus 18-19-20 mm balloon 10-11-12 mm hydraulic dilation of biliary ampulla.     Updated NM GASTRIC EMPTYING study on 4/27/23 noted gastric emptying halftime measured at 204 minutes consistent with delayed gastric emptying. This previously measured approximately 223.5 minutes     She is here today for evaluation for what appears to be chronic gastric outlet obstruction from hypertrophic pylorus, gastroparesis, history of anorexia, and severe malnutrition.  The patient's BMI is 11.21 and she only weighs 29 kg.  This has occurred over the last 4 to 5 years.  She did have a history of anorexia but that resolved over 12 years ago.  The patient appears very cachectic.  She is alert and oriented x3 and very pleasant.  The patient has been scope by gastroenterology multiple times and has had multiple injections of Botox into the pylorus with pyloric dilations.  She states that last for about 2 weeks and then she can eat again.  She is presently on liquids only and continues to lose weight.  We did send her for an updated gastric emptying study and it is positive  with delayed of 204 minutes.  She is not taking any narcotics or any type of motility inhibitor.  She is here with her  discuss neck steps.  We have no imaging.  What is also unusual as the patient has suffered multiple bouts of pancreatitis.  She has had some common duct stones that were extracted 2 to 3 years ago and ever since then she has had intermittent pancreatitis.  We have no recent imaging of her pancreas. She has no family history of malignancies, she is never had a malignancy.  She had an open cholecystectomy when she was 11 years old.  It is unclear the reason why.  She is here with her  to discuss what options she has. I have reviewed the notes from GI, and other available records.     Update 5/24/23  She subsequently underwent an Sinai-en-Y gastrojejunostomy bypass on May 5, 2023.  She presented for a 2-week postoperative examination and staple removal. She informed us that she did the surgery has helped and she is able to eat better. She is having multiple bowel movements in a day which are soft  She used to not be able to tolerate food prior to her surgery.  She use to be constipated for up to 7 to 10 days. She had presented to the emergency room on the 15th for swelling.  She was placed on a diuretic discharged home.  She informs me that her swelling has improved significantly.  Has lost 1 pound however that is being on diuretics and also starting to eat more small and frequent meals.  She informs me she was able to take a bite of spaghetti. She has expressed immense gratitude to our team, and Dr. Gutierrez for referring him to us and Dr. Garcia's NP for referring to Dr. Gutierrez.     Update 6/13/23  She is here today for a four week post operative examination.  This is the patient's last postop visit.  She actually has increased the volume of food according to her  and herself.  She intermittently has some nausea but is managing that with Zofran that she has.  Other than that she has  no complaints.    Update 8/8/23  Patient admitted from ED on 7/6/23 with abdominal pain, diarrhea, poor PO intake, and failure to thrive. Sent from OSH with concern for bowel perforation. Discharged home on 7/11/23.    CT ABDOMEN on 7/10/23 noted changes of gastric bypass, prior cholecystectomy with pneumobilia as before. Small BILATERAL pleural effusions, new since recent outside study. Ascites, increased since the recent outside study. RIGHT upper and LEFT lower quadrant abdominal wall gas, possibly related to medication administration or other intervention.     She is here today for follow-up status post admission to the hospital back in early July.  The patient had severe diarrhea and severe abdominal pain resulting in nausea and vomiting.  In the hospital the CT scan was read as free air and possible perforation but the patient did not have a perforation.  She was just having what appears to be severe pancreatic exocrine insufficiency and uncontrollable diarrhea and malabsorption problems.  This was corrected but unfortunately she was only given a prescription for 6000 lipase units to take with each meal 1 to 2 tablets.  This is inadequate for actual full absorption.  She continued to lose weight.  I have personally reviewed the patient's CT scan from July 10, 2023.  She is here with her  to discuss neck steps  Past Medical History:   Diagnosis Date    Acute recurrent pancreatitis 08/2021    Anesthesia     PONV    Arthritis 08/2021    fingers    Celiac disease     Disorder of thyroid     hypothyroid    Gastroparesis 08/2021    Gluten intolerance      Past Surgical History:   Procedure Laterality Date    GASTROJEJUNOSTOMY N/A 5/5/2023    Procedure: MAIK-EN-Y GASTROJEJUNOSTOMY BYPASS AND EXPLORATORY LAPAORTOMY;  Surgeon: Bradly Cisneros M.D.;  Location: SURGERY [facility];  Service: General    AK UPPER GI ENDOSCOPY,DIAGNOSIS  4/18/2022    Procedure: GASTROSCOPY - WITH PYLORUS DILATION WITH BOTOX  "INJECTION;  Surgeon: Rivas Cadet M.D.;  Location: [facility];  Service: Gastroenterology    [place] ERCP,DIAGNOSTIC  2022    Procedure: ERCP (ENDOSCOPIC RETROGRADE CHOLANGIOPANCREATOGRAPHY);  Surgeon: Rivas Cadet M.D.;  Location: [facility];  Service: Gastroenterology    [place] UPPER GI ENDOSCOPY,DIAGNOSIS N/A 2021    Procedure: GASTROSCOPY;  Surgeon: Rivas Cadet M.D.;  Location: [facility];  Service: EUS    [place] ERCP,DIAGNOSTIC N/A 2021    Procedure: ERCP, DIAGNOSTIC;  Surgeon: Rivas Cadet M.D.;  Location: [facility];  Service: EUS    EGD W/ENDOSCOPIC ULTRASOUND N/A 2021    Procedure: EGD, WITH ENDOSCOPIC US - UPPER RADIAL;  Surgeon: Rivas Cadet M.D.;  Location: [facility];  Service: EUS    ORIF, HUMERUS Left 2006    KNEE ARTHROSCOPY Left 1973    CHOLECYSTECTOMY  1963    APPENDECTOMY CHILD      PRIMARY C SECTION  , ,      Allergies   Allergen Reactions    Hydrocodone-Acetaminophen Rash     Rash, \"I can tolerate in small amounts\"      Loratadine-Pseudoephedrine Rash     rash    Sertraline Hcl Palpitations    Tramadol Vomiting     Fainting    Zoloft Rash     rash    Codeine Vomiting    Gluten Meal      \"violent stomach upset for 4-5 days\"     Outpatient Encounter Medications as of 2023   Medication Sig Dispense Refill    Pancrelipase, Lip-Prot-Amyl, (CREON) 70161-110989 units Cap DR Particles Take 2 Capsules by mouth 3 times a day. Take 2 capsules per each meal, and 1 capsule per snack 250 Capsule 11    [] oxyCODONE immediate-release (ROXICODONE) 5 MG Tab Take 1 Tablet by mouth every 8 hours as needed for Severe Pain for up to 3 days. 9 Tablet 0    pancrelipase, Lip-Prot-Amyl, (CREON 6000) 6000-00622 units Cap DR Particles Take 2 Capsules by mouth 3 times a day with meals for 30 days. 180 Capsule 0    pancrelipase, Lip-Prot-Amyl, (CREON 6000) 6000-98284 units Cap DR Particles Take 2 " Capsules by mouth 3 times a day with meals for 30 days. 180 Capsule 0    CALCIUM PO Take 1 Tablet by mouth every day.      multivitamin Tab Take 1 Tablet by mouth every day.      ondansetron (ZOFRAN) 4 MG Tab tablet Take 4 mg by mouth every 8 hours as needed for Nausea/Vomiting.      levothyroxine (SYNTHROID) 50 MCG Tab Take 50 mcg by mouth every morning on an empty stomach.       No facility-administered encounter medications on file as of 8/8/2023.     Social History     Socioeconomic History    Marital status:      Spouse name: Not on file    Number of children: Not on file    Years of education: Not on file    Highest education level: Not on file   Occupational History    Not on file   Tobacco Use    Smoking status: Never    Smokeless tobacco: Never   Vaping Use    Vaping Use: Never used   Substance and Sexual Activity    Alcohol use: Never    Drug use: Yes     Types: Oral     Comment: cbd/thc, daily for pain - last took 5/3/2023    Sexual activity: Not on file   Other Topics Concern    Not on file   Social History Narrative    Not on file     Social Determinants of Health     Financial Resource Strain: Not on file   Food Insecurity: Not on file   Transportation Needs: Not on file   Physical Activity: Not on file   Stress: Not on file   Social Connections: Not on file   Intimate Partner Violence: Not on file   Housing Stability: Not on file      Social History     Tobacco Use   Smoking Status Never   Smokeless Tobacco Never     Social History     Substance and Sexual Activity   Alcohol Use Never     Social History     Substance and Sexual Activity   Drug Use Yes    Types: Oral    Comment: cbd/thc, daily for pain - last took 5/3/2023      History reviewed. No pertinent family history.    Review of Systems   Constitutional:  Positive for malaise/fatigue and weight loss.   Gastrointestinal:  Positive for diarrhea and nausea.   All other systems reviewed and are negative.       Objective:   /64 (BP  Location: Right arm, Patient Position: Sitting, BP Cuff Size: Small adult)   Pulse 67   Temp 36.9 °C (98.4 °F) (Temporal)   Wt 27.4 kg (60 lb 4.8 oz)   SpO2 100%   BMI 10.68 kg/m²     Physical Exam  Vitals and nursing note reviewed.   Constitutional:       Appearance: She is ill-appearing.      Comments: Extremely malnourished and thin   HENT:      Head: Normocephalic.      Nose: Nose normal.      Mouth/Throat:      Mouth: Mucous membranes are moist.      Pharynx: Oropharynx is clear.   Eyes:      Extraocular Movements: Extraocular movements intact.      Conjunctiva/sclera: Conjunctivae normal.      Pupils: Pupils are equal, round, and reactive to light.   Cardiovascular:      Rate and Rhythm: Normal rate and regular rhythm.      Pulses: Normal pulses.      Heart sounds: Normal heart sounds.   Pulmonary:      Effort: Pulmonary effort is normal.      Breath sounds: Normal breath sounds.   Abdominal:      General: Abdomen is flat. Bowel sounds are normal.      Palpations: Abdomen is soft.   Musculoskeletal:         General: Normal range of motion.      Cervical back: Normal range of motion and neck supple.   Skin:     General: Skin is warm.   Neurological:      General: No focal deficit present.      Mental Status: She is oriented to person, place, and time.   Psychiatric:         Mood and Affect: Mood normal.         Behavior: Behavior normal.         Thought Content: Thought content normal.         Judgment: Judgment normal.         Labs   Latest Reference Range & Units 07/11/23 05:30   WBC 4.8 - 10.8 K/uL 3.7 (L)   RBC 4.20 - 5.40 M/uL 3.18 (L)   Hemoglobin 12.0 - 16.0 g/dL 10.4 (L)   Hematocrit 37.0 - 47.0 % 30.3 (L)   MCV 81.4 - 97.8 fL 95.3   MCH 27.0 - 33.0 pg 32.7   MCHC 32.2 - 35.5 g/dL 34.3   RDW 35.9 - 50.0 fL 42.1   Platelet Count 164 - 446 K/uL 162 (L)   MPV 9.0 - 12.9 fL 9.9   (L): Data is abnormally low      Latest Reference Range & Units 07/11/23 05:30   Sodium 135 - 145 mmol/L 137   Potassium 3.6  - 5.5 mmol/L 3.8   Chloride 96 - 112 mmol/L 103   Co2 20 - 33 mmol/L 26   Anion Gap 7.0 - 16.0  8.0   Glucose 65 - 99 mg/dL 90   Bun 8 - 22 mg/dL 4 (L)   Creatinine 0.50 - 1.40 mg/dL 0.46 (L)   GFR (CKD-EPI) >60 mL/min/1.73 m 2 104   Calcium 8.5 - 10.5 mg/dL 8.8   (L): Data is abnormally low     Imaging  CT ABDOMEN (7/10/23)  IMPRESSION:  1.  Changes of gastric bypass  2.  Prior cholecystectomy with pneumobilia as before  3.  Small BILATERAL pleural effusions, new since recent outside study  4.  Ascites, increased since the recent outside study  5.  RIGHT upper and LEFT lower quadrant abdominal wall gas, possibly related to medication administration or other intervention  6.  BILATERAL L5 pars defects with grade 1 anterolisthesis of L5 on S1    CT ABDOMEN (5/3/23)  IMPRESSION:  1.  Prior cholecystectomy.  2.  Pneumobilia.  3.  No significant pancreatic abnormality.  4.  Abdominal aortic atherosclerosis without aneurysm.     NM GASTRIC EMPTYING STUDY (4/27/23)  IMPRESSION:  Gastric emptying halftime measured at 204 minutes consistent with delayed gastric emptying. This previously measured approximately 223.5 minutes        NM GASTRIC EMPTYING STUDY (7/20/21)  FINDINGS:     After observation the activity remaining in the stomach is as follows:     Immediately:  100%     1 hour:  68%     2 hours:   59%     4 hours:   47%     IMPRESSION:     Significant retained gastric activity at 4 hours indicating delayed gastric emptying.     Pathology  N/A     Procedures  SURGERY (5/5/23)  PROCEDURE:  Sinai-en-Y gastrojejunostomy.     PROCEDURE (4/18/23)  GASTROSCOPY - WITH PYLORUS DILATION WITH BOTOX INJECTION - Wound Class: Clean Contaminated  ERCP (ENDOSCOPIC RETROGRADE CHOLANGIOPANCREATOGRAPHY) - Wound Class: Clean Contaminated     Findings:              EGD                          Esophagus                                      Unremarkable mucosa                          Stomach                                       Unremarkable mucosa                                      Narrow caliber pylorus                          Duodenum                                      Unremarkable mucosa                 ERCP                          Ampulla                                      Post sphincterotomy anatomy                          Pancreatic duct                                      Neither injected nor cannulated                          CBD                                      Normal course and caliber                 Therapeutics                          Intersphincteric injection of Botox - 100U divided 4 quadrants                          Hydraulic dilation of pylorus 18-19-20 mm balloon                          10-11-12 mm hydraulic dilation of biliary ampulla                          Balloon sweeps negative for stones or debris     PROCEDURE (8/9/21)  1.  Esophagogastroduodenoscopy with hydraulic dilation of gastric outlet.  2.  Endoscopic ultrasound, upper.  3.  Endoscopic retrograde cholangiopancreatography with biliary   sphincterotomy, bile duct balloon soft stone debris extraction, radiologic   interpretation of static and dynamic fluoroscopic images by myself, at no time   was a radiologist present in the room.     EGD FINDINGS:  1.  Esophagus:  Unremarkable mucosa.  2.  Long and J-shaped stomach, otherwise unremarkable mucosa.  3.  Mildly stenotic pylorus, successfully dilated to 20 mm with balloon.  4.  Duodenum:  Unremarkable mucosa.  Possible extrinsic compression of the   second portion.     ENDOSCOPIC ULTRASOUND FINDINGS:  1.  Celiac axis, no adenopathy.  2.  Pancreatic body and tail, normal parenchyma.  3.  Pancreatic duct, normal course and caliber.  4.  Gallbladder, surgically absent.  5.  Biliary ampulla, unremarkable.  6.  Common bile duct, generous caliber down to the level of the ampulla.    Normal course.  Small subtle nonshadowing filling defect suspicious of stone   versus sludge ball.  7.  Head of  pancreas, normal parenchyma and normal dorsal ventral transition.     ERCP FINDINGS:  1.  Biliary ampulla, small, but otherwise unremarkable appearance.  Stenotic   to wire and cannula passage.  2.  Pancreatic duct, neither injected nor cannulated.  3.  Common bile duct, normal course.  Generous caliber.  Small distal filling   defect consistent with small stone.    Diagnosis:     1. Delayed gastric emptying  Pancrelipase, Lip-Prot-Amyl, (CREON) 07827-920224 units Cap DR Particles      2. History of Sinai-en-Y gastric bypass  Pancrelipase, Lip-Prot-Amyl, (CREON) 48695-700570 units Cap DR Particles      3. Partial gastric outlet obstruction  Pancrelipase, Lip-Prot-Amyl, (CREON) 96921-682602 units Cap DR Particles      4. Anorexia nervosa  Pancrelipase, Lip-Prot-Amyl, (CREON) 31991-383378 units Cap DR Particles      5. Underweight due to inadequate caloric intake  Pancrelipase, Lip-Prot-Amyl, (CREON) 68772-787862 units Cap DR Particles          Medical Decision Making:  Today's Assessment / Status / Plan:     In light of the present findings, the patient had been admitted to the hospital for severe abdominal pain nausea vomiting and diarrhea.  Based on her presentation, the patient has severe pancreatic exocrine insufficiency in her prescriptions were extremely low and adequate for her needs.  I recommended she take 36,000 lipase units 2 capsules with each meal and 1 with each snack.  She was presently on 6000 lipase units.  I have given her 3 bottles of samples and had the patient call me on Friday to let me know if it worked.  We have also given her a written prescription because she thinks she can get it cheaper in [place] but also help from the company [employer] has assistance programs and we have given him the card and the numbers to call.  In the meantime she will contact me after she uses the new Creon prescription for 2 to 3 days.    I, Dr. Cisneros have entered, reviewed and confirmed the above diagnoses  related to this patient on this date of service, 8/8/2023  8:52 AM.    She agreed and verbalized her agreement and understanding with the current plan. I answered all questions and concerns she has at this time and advised her to call at any time in the interim with questions or concerns in regards to her care.    Thank you for allowing me to participate in her care, I will continue to follow closely.       Please note that this dictation was created using voice recognition software. I have made every reasonable attempt to correct obvious errors, but I expect that there are errors of grammar and possibly content that I did not discover before finalizing the note.     Thank you for this consultation and allowing me to participate in your patient's care. If I can be of further service please contact my office.      I, Dr Cisneros, have entered, reviewed and confirmed the above diagnoses related to this patient on this date of service, as per the time and date noted at top of this note.

## 2023-08-08 ENCOUNTER — OFFICE VISIT (OUTPATIENT)
Dept: SURGICAL ONCOLOGY | Facility: MEDICAL CENTER | Age: 68
End: 2023-08-08
Payer: MEDICARE

## 2023-08-08 VITALS
WEIGHT: 60.3 LBS | HEART RATE: 67 BPM | DIASTOLIC BLOOD PRESSURE: 64 MMHG | BODY MASS INDEX: 10.68 KG/M2 | TEMPERATURE: 98.4 F | SYSTOLIC BLOOD PRESSURE: 100 MMHG | OXYGEN SATURATION: 100 %

## 2023-08-08 DIAGNOSIS — Z98.84 HISTORY OF ROUX-EN-Y GASTRIC BYPASS: ICD-10-CM

## 2023-08-08 DIAGNOSIS — R63.6 UNDERWEIGHT DUE TO INADEQUATE CALORIC INTAKE: ICD-10-CM

## 2023-08-08 DIAGNOSIS — F50.00 ANOREXIA NERVOSA: ICD-10-CM

## 2023-08-08 DIAGNOSIS — K30 DELAYED GASTRIC EMPTYING: ICD-10-CM

## 2023-08-08 DIAGNOSIS — K31.1 PARTIAL GASTRIC OUTLET OBSTRUCTION: ICD-10-CM

## 2023-08-08 PROCEDURE — 3074F SYST BP LT 130 MM HG: CPT | Performed by: SURGERY

## 2023-08-08 PROCEDURE — 99215 OFFICE O/P EST HI 40 MIN: CPT | Performed by: SURGERY

## 2023-08-08 PROCEDURE — 3078F DIAST BP <80 MM HG: CPT | Performed by: SURGERY

## 2023-08-08 RX ORDER — PANCRELIPASE 36000; 180000; 114000 [USP'U]/1; [USP'U]/1; [USP'U]/1
2 CAPSULE, DELAYED RELEASE PELLETS ORAL 3 TIMES DAILY
Qty: 250 CAPSULE | Refills: 11 | Status: SHIPPED | OUTPATIENT
Start: 2023-08-08

## 2023-08-08 ASSESSMENT — ENCOUNTER SYMPTOMS
WEIGHT LOSS: 1
DIARRHEA: 1
NAUSEA: 1

## 2023-08-08 ASSESSMENT — FIBROSIS 4 INDEX: FIB4 SCORE: 2.74

## 2023-08-08 NOTE — PATIENT INSTRUCTIONS
The patient will follow-up with me via phone call to see if the Creon is working and she will follow-up with us in 3 weeks.

## 2023-08-09 NOTE — PROGRESS NOTES
Subjective:   9/5/2023  8:41 AM  Primary care physician: Pcp Not In Computer  Referring Provider: Femi Gutierrez M.D.    Chief Complaint:   Chief Complaint   Patient presents with    Other     FOLLOW UP  HX SINAI EN Y   MALNOURISHED  STARTED ON CREON     Diagnosis:   1. Delayed gastric emptying  DX-UPPER GI-AIR WITH SBFT      2. History of Sinai-en-Y gastric bypass  DX-UPPER GI-AIR WITH SBFT      3. Partial gastric outlet obstruction  DX-UPPER GI-AIR WITH SBFT      4. Anorexia nervosa  DX-UPPER GI-AIR WITH SBFT      5. Underweight due to inadequate caloric intake  DX-UPPER GI-AIR WITH SBFT      6. Weight loss, non-intentional            History of presenting illness:  Kathy Dumont is a pleasant 67 y.o. female  female with hx of anorexia nervosa, celiac disease(?), idiopathic gastroparesis, and idiopathic recurrent pancreatitis. She has a long GI history and was previously seen at [facility] and at  in [place], [place]. Her latest weight is 29 kg, which is a slight decrease from December. She has previous attempted numerous medications including Reglan, Creon, Linzess, Domporidon. Her diet consists mostly of jello, baby food, ice cream, Boost drinks. She previously tried working with a nurtitionist - but had no clear results.     GASTRIC EMPTYING STUDY on 7/20/21 noted significant retained gastric activity at 4 hours indicating delayed gastric emptying.     EGD on 8/9/21 noted   EGD FINDINGS:  1.  Esophagus:  Unremarkable mucosa.  2.  Long and J-shaped stomach, otherwise unremarkable mucosa.  3.  Mildly stenotic pylorus, successfully dilated to 20 mm with balloon.  4.  Duodenum:  Unremarkable mucosa.  Possible extrinsic compression of the second portion.     ENDOSCOPIC ULTRASOUND FINDINGS:  1.  Celiac axis, no adenopathy.  2.  Pancreatic body and tail, normal parenchyma.  3.  Pancreatic duct, normal course and caliber.  4.  Gallbladder, surgically absent.  5.  Biliary ampulla, unremarkable.  6.  Common bile  duct, generous caliber down to the level of the ampulla.  Normal course.  Small subtle nonshadowing filling defect suspicious of stone versus sludge ball.  7.  Head of pancreas, normal parenchyma and normal dorsal ventral transition.     ERCP FINDINGS:  1.  Biliary ampulla, small, but otherwise unremarkable appearance.  Stenotic to wire and cannula passage.  2.  Pancreatic duct, neither injected nor cannulated.  3.  Common bile duct, normal course.  Generous caliber.  Small distal filling defect consistent with small stone.     EGD by Dr Cadet on 4/18/23 noted unremarkable mucosa, and included intersphincteric injection of Botox - 100U divided 4 quadrants. Hydraulic dilation of pylorus 18-19-20 mm balloon 10-11-12 mm hydraulic dilation of biliary ampulla.     Updated NM GASTRIC EMPTYING study on 4/27/23 noted gastric emptying halftime measured at 204 minutes consistent with delayed gastric emptying. This previously measured approximately 223.5 minutes     She is here today for evaluation for what appears to be chronic gastric outlet obstruction from hypertrophic pylorus, gastroparesis, history of anorexia, and severe malnutrition.  The patient's BMI is 11.21 and she only weighs 29 kg.  This has occurred over the last 4 to 5 years.  She did have a history of anorexia but that resolved over 12 years ago.  The patient appears very cachectic.  She is alert and oriented x3 and very pleasant.  The patient has been scope by gastroenterology multiple times and has had multiple injections of Botox into the pylorus with pyloric dilations.  She states that last for about 2 weeks and then she can eat again.  She is presently on liquids only and continues to lose weight.  We did send her for an updated gastric emptying study and it is positive with delayed of 204 minutes.  She is not taking any narcotics or any type of motility inhibitor.  She is here with her  discuss neck steps.  We have no imaging.  What is also  unusual as the patient has suffered multiple bouts of pancreatitis.  She has had some common duct stones that were extracted 2 to 3 years ago and ever since then she has had intermittent pancreatitis.  We have no recent imaging of her pancreas. She has no family history of malignancies, she is never had a malignancy.  She had an open cholecystectomy when she was 11 years old.  It is unclear the reason why.  She is here with her  to discuss what options she has. I have reviewed the notes from GI, and other available records.     Update 5/24/23  She subsequently underwent an Sinai-en-Y gastrojejunostomy bypass on May 5, 2023.  She presented for a 2-week postoperative examination and staple removal. She informed us that she did the surgery has helped and she is able to eat better. She is having multiple bowel movements in a day which are soft  She used to not be able to tolerate food prior to her surgery.  She use to be constipated for up to 7 to 10 days. She had presented to the emergency room on the 15th for swelling.  She was placed on a diuretic discharged home.  She informs me that her swelling has improved significantly.  Has lost 1 pound however that is being on diuretics and also starting to eat more small and frequent meals.  She informs me she was able to take a bite of spaghetti. She has expressed immense gratitude to our team, and Dr. Gutierrez for referring him to us and Dr. Garcia's NP for referring to Dr. Gutierrez.     Update 6/13/23  She is here today for a four week post operative examination.  This is the patient's last postop visit.  She actually has increased the volume of food according to her  and herself.  She intermittently has some nausea but is managing that with Zofran that she has.  Other than that she has no complaints.     Update 8/8/23  Patient admitted from ED on 7/6/23 with abdominal pain, diarrhea, poor PO intake, and failure to thrive. Sent from OSH with concern for bowel  perforation. Discharged home on 7/11/23.     CT ABDOMEN on 7/10/23 noted changes of gastric bypass, prior cholecystectomy with pneumobilia as before. Small BILATERAL pleural effusions, new since recent outside study. Ascites, increased since the recent outside study. RIGHT upper and LEFT lower quadrant abdominal wall gas, possibly related to medication administration or other intervention.      She is here today for follow-up status post admission to the hospital back in early July.  The patient had severe diarrhea and severe abdominal pain resulting in nausea and vomiting.  In the hospital the CT scan was read as free air and possible perforation but the patient did not have a perforation.  She was just having what appears to be severe pancreatic exocrine insufficiency and uncontrollable diarrhea and malabsorption problems.  This was corrected but unfortunately she was only given a prescription for 6000 lipase units to take with each meal 1 to 2 tablets.  This is inadequate for actual full absorption.  She continued to lose weight.  I have personally reviewed the patient's CT scan from July 10, 2023.  She is here with her  to discuss neck steps    Update 9/5/23  She had been started on Creon.    In follow-up, the patient states that the pancreatic enzymes seem to immediately work for her.  She was having formed stools and tolerating her diet.  Unfortunately over the last several days she developed severe abdominal cramping with severe diarrhea.  Even though she is taking the enzymes.  She is also lost weight from 64 pounds to 60 pounds since her last visit.  She states she was doing well until recently she started having abdominal pain and cramping in early satiety.  She was having trouble eating.  She would stop eating due to feeling full and bloated.  She is here with her  to discuss neck steps.    Past Medical History:   Diagnosis Date    Acute recurrent pancreatitis 08/2021    Anesthesia     PONV  "   Arthritis 08/2021    fingers    Celiac disease     Disorder of thyroid     hypothyroid    Gastroparesis 08/2021    Gluten intolerance      Past Surgical History:   Procedure Laterality Date    GASTROJEJUNOSTOMY N/A 5/5/2023    Procedure: MAIK-EN-Y GASTROJEJUNOSTOMY BYPASS AND EXPLORATORY LAPAORTOMY;  Surgeon: Bradly Cisneros M.D.;  Location: [facility];  Service: General    SC UPPER GI ENDOSCOPY,DIAGNOSIS  4/18/2022    Procedure: GASTROSCOPY - WITH PYLORUS DILATION WITH BOTOX INJECTION;  Surgeon: Rivas Cadet M.D.;  Location: [facility];  Service: Gastroenterology    SC ERCP,DIAGNOSTIC  4/18/2022    Procedure: ERCP (ENDOSCOPIC RETROGRADE CHOLANGIOPANCREATOGRAPHY);  Surgeon: Rivas Cadet M.D.;  Location: [facility];  Service: Gastroenterology    SC UPPER GI ENDOSCOPY,DIAGNOSIS N/A 8/9/2021    Procedure: GASTROSCOPY;  Surgeon: Rivas Cadet M.D.;  Location: [facility];  Service: EUS    SC ERCP,DIAGNOSTIC N/A 8/9/2021    Procedure: ERCP, DIAGNOSTIC;  Surgeon: Rivas Cadet M.D.;  Location: [facility];  Service: EUS    EGD W/ENDOSCOPIC ULTRASOUND N/A 8/9/2021    Procedure: EGD, WITH ENDOSCOPIC US - UPPER RADIAL;  Surgeon: Rivas Cadet M.D.;  Location: [facility];  Service: EUS    ORIF, HUMERUS Left 2006    KNEE ARTHROSCOPY Left 1973    CHOLECYSTECTOMY  1963    APPENDECTOMY CHILD      PRIMARY C SECTION  1984, 1987, 1990     Allergies   Allergen Reactions    Hydrocodone-Acetaminophen Rash     Rash, \"I can tolerate in small amounts\"      Loratadine-Pseudoephedrine Rash     rash    Sertraline Hcl Palpitations    Tramadol Vomiting     Fainting    Zoloft Rash     rash    Codeine Vomiting    Gluten Meal      \"violent stomach upset for 4-5 days\"     Outpatient Encounter Medications as of 9/5/2023   Medication Sig Dispense Refill    Pancrelipase, Lip-Prot-Amyl, (CREON) 62607-067465 units Cap DR Particles Take 2 " Capsules by mouth 3 times a day. Take 2 capsules per each meal, and 1 capsule per snack 250 Capsule 11    [] pancrelipase, Lip-Prot-Amyl, (CREON 6000) 6000-45483 units Cap DR Particles Take 2 Capsules by mouth 3 times a day with meals for 30 days. 180 Capsule 0    [] pancrelipase, Lip-Prot-Amyl, (CREON 6000) 6000-15776 units Cap DR Particles Take 2 Capsules by mouth 3 times a day with meals for 30 days. 180 Capsule 0    CALCIUM PO Take 1 Tablet by mouth every day.      multivitamin Tab Take 1 Tablet by mouth every day.      ondansetron (ZOFRAN) 4 MG Tab tablet Take 4 mg by mouth every 8 hours as needed for Nausea/Vomiting.      levothyroxine (SYNTHROID) 50 MCG Tab Take 50 mcg by mouth every morning on an empty stomach.       No facility-administered encounter medications on file as of 2023.     Social History     Socioeconomic History    Marital status:      Spouse name: Not on file    Number of children: Not on file    Years of education: Not on file    Highest education level: Not on file   Occupational History    Not on file   Tobacco Use    Smoking status: Never    Smokeless tobacco: Never   Vaping Use    Vaping Use: Never used   Substance and Sexual Activity    Alcohol use: Never    Drug use: Yes     Types: Oral     Comment: cbd/thc, daily for pain - last took 5/3/2023    Sexual activity: Not on file   Other Topics Concern    Not on file   Social History Narrative    Not on file     Social Determinants of Health     Financial Resource Strain: Not on file   Food Insecurity: Not on file   Transportation Needs: Not on file   Physical Activity: Not on file   Stress: Not on file   Social Connections: Not on file   Intimate Partner Violence: Not on file   Housing Stability: Not on file      Social History     Tobacco Use   Smoking Status Never   Smokeless Tobacco Never     Social History     Substance and Sexual Activity   Alcohol Use Never     Social History     Substance and Sexual Activity    Drug Use Yes    Types: Oral    Comment: cbd/thc, daily for pain - last took 5/3/2023      History reviewed. No pertinent family history.      Review of Systems   Constitutional:  Positive for malaise/fatigue and weight loss.   All other systems reviewed and are negative.       Objective:   /70 (BP Location: Right arm, Patient Position: Sitting, BP Cuff Size: Adult)   Pulse 68   Temp 36.5 °C (97.7 °F) (Temporal)   Wt 27.2 kg (60 lb)   SpO2 94%   BMI 10.63 kg/m²     Physical Exam  Vitals and nursing note reviewed.   Constitutional:       Appearance: She is ill-appearing.      Comments: Continued weight loss   HENT:      Nose: Nose normal.      Mouth/Throat:      Mouth: Mucous membranes are moist.      Pharynx: Oropharynx is clear.   Eyes:      Extraocular Movements: Extraocular movements intact.      Conjunctiva/sclera: Conjunctivae normal.      Pupils: Pupils are equal, round, and reactive to light.   Cardiovascular:      Rate and Rhythm: Normal rate and regular rhythm.      Pulses: Normal pulses.      Heart sounds: Normal heart sounds.   Pulmonary:      Effort: Pulmonary effort is normal.      Breath sounds: Normal breath sounds.   Abdominal:      General: Abdomen is flat. Bowel sounds are normal.      Palpations: Abdomen is soft.      Comments: Complains of subjective pain and discomfort over the last several days following eating with diarrhea.  The pancreatic enzymes did help inform her stool except for the last several days.   Musculoskeletal:         General: Normal range of motion.      Cervical back: Normal range of motion and neck supple.   Skin:     General: Skin is warm and dry.   Neurological:      General: No focal deficit present.      Mental Status: She is alert and oriented to person, place, and time. Mental status is at baseline.   Psychiatric:         Mood and Affect: Mood normal.         Behavior: Behavior normal.         Thought Content: Thought content normal.         Judgment:  Judgment normal.       Labs    Latest Reference Range & Units 07/11/23 05:30   WBC 4.8 - 10.8 K/uL 3.7 (L)   RBC 4.20 - 5.40 M/uL 3.18 (L)   Hemoglobin 12.0 - 16.0 g/dL 10.4 (L)   Hematocrit 37.0 - 47.0 % 30.3 (L)   MCV 81.4 - 97.8 fL 95.3   MCH 27.0 - 33.0 pg 32.7   MCHC 32.2 - 35.5 g/dL 34.3   RDW 35.9 - 50.0 fL 42.1   Platelet Count 164 - 446 K/uL 162 (L)   MPV 9.0 - 12.9 fL 9.9   (L): Data is abnormally low       Latest Reference Range & Units 07/11/23 05:30   Sodium 135 - 145 mmol/L 137   Potassium 3.6 - 5.5 mmol/L 3.8   Chloride 96 - 112 mmol/L 103   Co2 20 - 33 mmol/L 26   Anion Gap 7.0 - 16.0  8.0   Glucose 65 - 99 mg/dL 90   Bun 8 - 22 mg/dL 4 (L)   Creatinine 0.50 - 1.40 mg/dL 0.46 (L)   GFR (CKD-EPI) >60 mL/min/1.73 m 2 104   Calcium 8.5 - 10.5 mg/dL 8.8   (L): Data is abnormally low     Imaging  CT ABDOMEN (7/10/23)  IMPRESSION:  1.  Changes of gastric bypass  2.  Prior cholecystectomy with pneumobilia as before  3.  Small BILATERAL pleural effusions, new since recent outside study  4.  Ascites, increased since the recent outside study  5.  RIGHT upper and LEFT lower quadrant abdominal wall gas, possibly related to medication administration or other intervention  6.  BILATERAL L5 pars defects with grade 1 anterolisthesis of L5 on S1     CT ABDOMEN (5/3/23)  IMPRESSION:  1.  Prior cholecystectomy.  2.  Pneumobilia.  3.  No significant pancreatic abnormality.  4.  Abdominal aortic atherosclerosis without aneurysm.     NM GASTRIC EMPTYING STUDY (4/27/23)  IMPRESSION:  Gastric emptying halftime measured at 204 minutes consistent with delayed gastric emptying. This previously measured approximately 223.5 minutes        NM GASTRIC EMPTYING STUDY (7/20/21)  FINDINGS:     After observation the activity remaining in the stomach is as follows:     Immediately:  100%     1 hour:  68%     2 hours:   59%     4 hours:   47%     IMPRESSION:     Significant retained gastric activity at 4 hours indicating delayed gastric  emptying.     Pathology  N/A     Procedures  SURGERY (5/5/23)  PROCEDURE:  Sinai-en-Y gastrojejunostomy.     PROCEDURE (4/18/23)  GASTROSCOPY - WITH PYLORUS DILATION WITH BOTOX INJECTION - Wound Class: Clean Contaminated  ERCP (ENDOSCOPIC RETROGRADE CHOLANGIOPANCREATOGRAPHY) - Wound Class: Clean Contaminated     Findings:              EGD                          Esophagus                                      Unremarkable mucosa                          Stomach                                      Unremarkable mucosa                                      Narrow caliber pylorus                          Duodenum                                      Unremarkable mucosa                 ERCP                          Ampulla                                      Post sphincterotomy anatomy                          Pancreatic duct                                      Neither injected nor cannulated                          CBD                                      Normal course and caliber                 Therapeutics                          Intersphincteric injection of Botox - 100U divided 4 quadrants                          Hydraulic dilation of pylorus 18-19-20 mm balloon                          10-11-12 mm hydraulic dilation of biliary ampulla                          Balloon sweeps negative for stones or debris     PROCEDURE (8/9/21)  1.  Esophagogastroduodenoscopy with hydraulic dilation of gastric outlet.  2.  Endoscopic ultrasound, upper.  3.  Endoscopic retrograde cholangiopancreatography with biliary   sphincterotomy, bile duct balloon soft stone debris extraction, radiologic   interpretation of static and dynamic fluoroscopic images by myself, at no time   was a radiologist present in the room.     EGD FINDINGS:  1.  Esophagus:  Unremarkable mucosa.  2.  Long and J-shaped stomach, otherwise unremarkable mucosa.  3.  Mildly stenotic pylorus, successfully dilated to 20 mm with balloon.  4.  Duodenum:  Unremarkable  mucosa.  Possible extrinsic compression of the   second portion.     ENDOSCOPIC ULTRASOUND FINDINGS:  1.  Celiac axis, no adenopathy.  2.  Pancreatic body and tail, normal parenchyma.  3.  Pancreatic duct, normal course and caliber.  4.  Gallbladder, surgically absent.  5.  Biliary ampulla, unremarkable.  6.  Common bile duct, generous caliber down to the level of the ampulla.    Normal course.  Small subtle nonshadowing filling defect suspicious of stone   versus sludge ball.  7.  Head of pancreas, normal parenchyma and normal dorsal ventral transition.     ERCP FINDINGS:  1.  Biliary ampulla, small, but otherwise unremarkable appearance.  Stenotic   to wire and cannula passage.  2.  Pancreatic duct, neither injected nor cannulated.  3.  Common bile duct, normal course.  Generous caliber.  Small distal filling   defect consistent with small stone.      Diagnosis:     1. Delayed gastric emptying  DX-UPPER GI-AIR WITH SBFT      2. History of Sinai-en-Y gastric bypass  DX-UPPER GI-AIR WITH SBFT      3. Partial gastric outlet obstruction  DX-UPPER GI-AIR WITH SBFT      4. Anorexia nervosa  DX-UPPER GI-AIR WITH SBFT      5. Underweight due to inadequate caloric intake  DX-UPPER GI-AIR WITH SBFT      6. Weight loss, non-intentional            Medical Decision Making:  Today's Assessment / Status / Plan:     In light of the present findings, the patient continues to have recent weight loss secondary to recent diarrhea.  She states the pancreatic enzymes had helped tremendously but over the last several days she has had moderate to severe diarrhea.  She denies any nausea or vomiting but has also associated abdominal pain.  My recommendation is to proceed with an upper GI with small bowel follow-through to see is her Sinai-en-Y gastrojejunostomy open and functioning well.  Also is there any distal obstruction.  The patient will call me after she has this completed.    I, Dr. Cisneros have entered, reviewed and confirmed  the above diagnoses related to this patient on this date of service, 9/5/2023  8:41 AM.    She agreed and verbalized her agreement and understanding with the current plan. I answered all questions and concerns she has at this time and advised her to call at any time in the interim with questions or concerns in regards to her care.    Thank you for allowing me to participate in her care, I will continue to follow closely.       Please note that this dictation was created using voice recognition software. I have made every reasonable attempt to correct obvious errors, but I expect that there are errors of grammar and possibly content that I did not discover before finalizing the note.     Thank you for this consultation and allowing me to participate in your patient's care. If I can be of further service please contact my office.

## 2023-09-05 ENCOUNTER — OFFICE VISIT (OUTPATIENT)
Dept: SURGICAL ONCOLOGY | Facility: MEDICAL CENTER | Age: 68
End: 2023-09-05
Payer: MEDICARE

## 2023-09-05 VITALS
BODY MASS INDEX: 10.63 KG/M2 | HEART RATE: 68 BPM | WEIGHT: 60 LBS | DIASTOLIC BLOOD PRESSURE: 70 MMHG | OXYGEN SATURATION: 94 % | TEMPERATURE: 97.7 F | SYSTOLIC BLOOD PRESSURE: 110 MMHG

## 2023-09-05 DIAGNOSIS — K30 DELAYED GASTRIC EMPTYING: ICD-10-CM

## 2023-09-05 DIAGNOSIS — R63.6 UNDERWEIGHT DUE TO INADEQUATE CALORIC INTAKE: ICD-10-CM

## 2023-09-05 DIAGNOSIS — Z98.84 HISTORY OF ROUX-EN-Y GASTRIC BYPASS: ICD-10-CM

## 2023-09-05 DIAGNOSIS — F50.00 ANOREXIA NERVOSA: ICD-10-CM

## 2023-09-05 DIAGNOSIS — R63.4 WEIGHT LOSS, NON-INTENTIONAL: ICD-10-CM

## 2023-09-05 DIAGNOSIS — K31.1 PARTIAL GASTRIC OUTLET OBSTRUCTION: ICD-10-CM

## 2023-09-05 PROCEDURE — 3074F SYST BP LT 130 MM HG: CPT | Performed by: SURGERY

## 2023-09-05 PROCEDURE — 3078F DIAST BP <80 MM HG: CPT | Performed by: SURGERY

## 2023-09-05 PROCEDURE — 99215 OFFICE O/P EST HI 40 MIN: CPT | Performed by: SURGERY

## 2023-09-05 ASSESSMENT — FIBROSIS 4 INDEX: FIB4 SCORE: 2.74

## 2023-09-05 ASSESSMENT — ENCOUNTER SYMPTOMS: WEIGHT LOSS: 1

## 2023-09-05 NOTE — PATIENT INSTRUCTIONS
The patient will be sent for an upper GI with small bowel follow-through to assess the Sinai-en-Y gastrojejunostomy.  She will call me after it has been completed to discuss results.

## 2023-09-13 ENCOUNTER — HOSPITAL ENCOUNTER (OUTPATIENT)
Dept: RADIOLOGY | Facility: MEDICAL CENTER | Age: 68
End: 2023-09-13
Attending: SURGERY
Payer: MEDICARE

## 2023-09-13 DIAGNOSIS — R63.6 UNDERWEIGHT DUE TO INADEQUATE CALORIC INTAKE: ICD-10-CM

## 2023-09-13 DIAGNOSIS — K31.1 PARTIAL GASTRIC OUTLET OBSTRUCTION: ICD-10-CM

## 2023-09-13 DIAGNOSIS — K30 DELAYED GASTRIC EMPTYING: ICD-10-CM

## 2023-09-13 DIAGNOSIS — Z98.84 HISTORY OF ROUX-EN-Y GASTRIC BYPASS: ICD-10-CM

## 2023-09-13 DIAGNOSIS — F50.00 ANOREXIA NERVOSA: ICD-10-CM

## 2023-09-13 PROCEDURE — 74248 X-RAY SM INT F-THRU STD: CPT

## 2023-11-16 NOTE — PROGRESS NOTES
Subjective:      Primary care physician: Zoey Thurman P.A.-C.  Referring Provider: Femi Gutierrez M.D.     Chief Complaint: No chief complaint on file.    Diagnosis:   1. Delayed gastric emptying        2. History of Sinai-en-Y gastric bypass        3. Partial gastric outlet obstruction        4. Anorexia nervosa          History of presenting illness:    Kathy Dumont is a pleasant 67 y.o. female  female with hx of anorexia nervosa, celiac disease(?), idiopathic gastroparesis, and idiopathic recurrent pancreatitis. She has a long GI history and was previously seen at [facility] and at  in [place], [place]. Her latest weight is 29 kg, which is a slight decrease from December. She has previous attempted numerous medications including Reglan, Creon, Linzess, Domporidon. Her diet consists mostly of jello, baby food, ice cream, Boost drinks. She previously tried working with a nurtitionist - but had no clear results.     GASTRIC EMPTYING STUDY on 7/20/21 noted significant retained gastric activity at 4 hours indicating delayed gastric emptying.     EGD on 8/9/21 noted   EGD FINDINGS:  1.  Esophagus:  Unremarkable mucosa.  2.  Long and J-shaped stomach, otherwise unremarkable mucosa.  3.  Mildly stenotic pylorus, successfully dilated to 20 mm with balloon.  4.  Duodenum:  Unremarkable mucosa.  Possible extrinsic compression of the second portion.     ENDOSCOPIC ULTRASOUND FINDINGS:  1.  Celiac axis, no adenopathy.  2.  Pancreatic body and tail, normal parenchyma.  3.  Pancreatic duct, normal course and caliber.  4.  Gallbladder, surgically absent.  5.  Biliary ampulla, unremarkable.  6.  Common bile duct, generous caliber down to the level of the ampulla.  Normal course.  Small subtle nonshadowing filling defect suspicious of stone versus sludge ball.  7.  Head of pancreas, normal parenchyma and normal dorsal ventral transition.     ERCP FINDINGS:  1.  Biliary ampulla, small, but otherwise unremarkable  appearance.  Stenotic to wire and cannula passage.  2.  Pancreatic duct, neither injected nor cannulated.  3.  Common bile duct, normal course.  Generous caliber.  Small distal filling defect consistent with small stone.     EGD by Dr Cadet on 4/18/23 noted unremarkable mucosa, and included intersphincteric injection of Botox - 100U divided 4 quadrants. Hydraulic dilation of pylorus 18-19-20 mm balloon 10-11-12 mm hydraulic dilation of biliary ampulla.     Updated NM GASTRIC EMPTYING study on 4/27/23 noted gastric emptying halftime measured at 204 minutes consistent with delayed gastric emptying. This previously measured approximately 223.5 minutes     She is here today for evaluation for what appears to be chronic gastric outlet obstruction from hypertrophic pylorus, gastroparesis, history of anorexia, and severe malnutrition.  The patient's BMI is 11.21 and she only weighs 29 kg.  This has occurred over the last 4 to 5 years.  She did have a history of anorexia but that resolved over 12 years ago.  The patient appears very cachectic.  She is alert and oriented x3 and very pleasant.  The patient has been scope by gastroenterology multiple times and has had multiple injections of Botox into the pylorus with pyloric dilations.  She states that last for about 2 weeks and then she can eat again.  She is presently on liquids only and continues to lose weight.  We did send her for an updated gastric emptying study and it is positive with delayed of 204 minutes.  She is not taking any narcotics or any type of motility inhibitor.  She is here with her  discuss neck steps.  We have no imaging.  What is also unusual as the patient has suffered multiple bouts of pancreatitis.  She has had some common duct stones that were extracted 2 to 3 years ago and ever since then she has had intermittent pancreatitis.  We have no recent imaging of her pancreas. She has no family history of malignancies, she is never had a  malignancy.  She had an open cholecystectomy when she was 11 years old.  It is unclear the reason why.  She is here with her  to discuss what options she has. I have reviewed the notes from GI, and other available records.     Update 5/24/23  She subsequently underwent an Sinai-en-Y gastrojejunostomy bypass on May 5, 2023.  She presented for a 2-week postoperative examination and staple removal. She informed us that she did the surgery has helped and she is able to eat better. She is having multiple bowel movements in a day which are soft  She used to not be able to tolerate food prior to her surgery.  She use to be constipated for up to 7 to 10 days. She had presented to the emergency room on the 15th for swelling.  She was placed on a diuretic discharged home.  She informs me that her swelling has improved significantly.  Has lost 1 pound however that is being on diuretics and also starting to eat more small and frequent meals.  She informs me she was able to take a bite of spaghetti. She has expressed immense gratitude to our team, and Dr. Gutierrez for referring him to us and Dr. Garcia's NP for referring to Dr. Gutierrez.     Update 6/13/23  She is here today for a four week post operative examination.  This is the patient's last postop visit.  She actually has increased the volume of food according to her  and herself.  She intermittently has some nausea but is managing that with Zofran that she has.  Other than that she has no complaints.     Update 8/8/23  Patient admitted from ED on 7/6/23 with abdominal pain, diarrhea, poor PO intake, and failure to thrive. Sent from OSH with concern for bowel perforation. Discharged home on 7/11/23.     CT ABDOMEN on 7/10/23 noted changes of gastric bypass, prior cholecystectomy with pneumobilia as before. Small BILATERAL pleural effusions, new since recent outside study. Ascites, increased since the recent outside study. RIGHT upper and LEFT lower quadrant abdominal  wall gas, possibly related to medication administration or other intervention.      She is here today for follow-up status post admission to the hospital back in early July. The patient had severe diarrhea and severe abdominal pain resulting in nausea and vomiting.  In the hospital the CT scan was read as free air and possible perforation but the patient did not have a perforation.  She was just having what appears to be severe pancreatic exocrine insufficiency and uncontrollable diarrhea and malabsorption problems.  This was corrected but unfortunately she was only given a prescription for 6000 lipase units to take with each meal 1 to 2 tablets.  This is inadequate for actual full absorption.  She continued to lose weight.  I have personally reviewed the patient's CT scan from July 10, 2023.  She is here with her  to discuss neck steps     Update 9/5/23  She had been started on Creon.     In follow-up, the patient states that the pancreatic enzymes seem to immediately work for her.  She was having formed stools and tolerating her diet.  Unfortunately over the last several days she developed severe abdominal cramping with severe diarrhea. Even though she is taking the enzymes.  She is also lost weight from 64 pounds to 60 pounds since her last visit.  She states she was doing well until recently she started having abdominal pain and cramping in early satiety.  She was having trouble eating. She would stop eating due to feeling full and bloated. She is here with her  to discuss next steps.    Update 12/12/23  DX SMALL BOWEL FOLLOW THROUGH on 9/13/23 noted no evidence of stricture or delay in passage of contrast across the anastomosis. Normal small bowel follow-through with contrast reaching the colon within 90 minutes.    She is here for follow-up status post upper GI and small bowel follow-through.  The patient was complaining she is having difficulty eating again.  She did undergo a internal drainage  of the pancreas as well as a gastric bypass in the past.  She presently denies any fever or chills but does complain of intermittent nausea.  She states certain foods bother her while others do not.  She continues to lose weight.  She appears to look healthier but she is losing weight.  She is down to 10.97 kg.  The patient states she is trying to eat but is having difficulty.  The upper GI showed no delayed gastric emptying and that the anastomosis is wide open.  She has emptied into her small bowel without difficulty and the contrast makes it to the colon at 90 minutes.  There is no sign of bowel obstruction either.  She is here with her  discuss neck steps.  I personally reviewed the patient's upper GI.    Past Medical History:   Diagnosis Date    Acute recurrent pancreatitis 08/2021    Anesthesia     PONV    Arthritis 08/2021    fingers    Celiac disease     Disorder of thyroid     hypothyroid    Gastroparesis 08/2021    Gluten intolerance      Past Surgical History:   Procedure Laterality Date    GASTROJEJUNOSTOMY N/A 5/5/2023    Procedure: MAIK-EN-Y GASTROJEJUNOSTOMY BYPASS AND EXPLORATORY LAPAORTOMY;  Surgeon: Bradly Cisneros M.D.;  Location: [facility];  Service: General    MA UPPER GI ENDOSCOPY,DIAGNOSIS  4/18/2022    Procedure: GASTROSCOPY - WITH PYLORUS DILATION WITH BOTOX INJECTION;  Surgeon: Rivas Cadet M.D.;  Location: [facility];  Service: Gastroenterology    MA ERCP,DIAGNOSTIC  4/18/2022    Procedure: ERCP (ENDOSCOPIC RETROGRADE CHOLANGIOPANCREATOGRAPHY);  Surgeon: Rivas Cadet M.D.;  Location: [facility];  Service: Gastroenterology    MA UPPER GI ENDOSCOPY,DIAGNOSIS N/A 8/9/2021    Procedure: GASTROSCOPY;  Surgeon: Rivas Cadet M.D.;  Location: [facility];  Service: EUS    MA ERCP,DIAGNOSTIC N/A 8/9/2021    Procedure: ERCP, DIAGNOSTIC;  Surgeon: Rivas Cadet M.D.;  Location: [facility];   "Service: EUS    EGD W/ENDOSCOPIC ULTRASOUND N/A 8/9/2021    Procedure: EGD, WITH ENDOSCOPIC US - UPPER RADIAL;  Surgeon: Rivas Cadet M.D.;  Location: [facility];  Service: EUS    ORIF, HUMERUS Left 2006    KNEE ARTHROSCOPY Left 1973    CHOLECYSTECTOMY  1963    APPENDECTOMY CHILD      PRIMARY C SECTION  1984, 1987, 1990     Allergies   Allergen Reactions    Hydrocodone-Acetaminophen Rash     Rash, \"I can tolerate in small amounts\"      Loratadine-Pseudoephedrine Rash     rash    Sertraline Hcl Palpitations    Tramadol Vomiting     Fainting    Zoloft Rash     rash    Codeine Vomiting    Gluten Meal      \"violent stomach upset for 4-5 days\"     Outpatient Encounter Medications as of 12/12/2023   Medication Sig Dispense Refill    Pancrelipase, Lip-Prot-Amyl, (CREON) 05121-178626 units Cap DR Particles Take 2 Capsules by mouth 3 times a day. Take 2 capsules per each meal, and 1 capsule per snack 250 Capsule 11    CALCIUM PO Take 1 Tablet by mouth every day.      multivitamin Tab Take 1 Tablet by mouth every day.      ondansetron (ZOFRAN) 4 MG Tab tablet Take 4 mg by mouth every 8 hours as needed for Nausea/Vomiting.      levothyroxine (SYNTHROID) 50 MCG Tab Take 50 mcg by mouth every morning on an empty stomach.       No facility-administered encounter medications on file as of 12/12/2023.     Social History     Socioeconomic History    Marital status:      Spouse name: Not on file    Number of children: Not on file    Years of education: Not on file    Highest education level: Not on file   Occupational History    Not on file   Tobacco Use    Smoking status: Never    Smokeless tobacco: Never   Vaping Use    Vaping Use: Never used   Substance and Sexual Activity    Alcohol use: Never    Drug use: Yes     Types: Oral     Comment: cbd/thc, daily for pain - last took 5/3/2023    Sexual activity: Not on file   Other Topics Concern    Not on file   Social History Narrative    Not on file     Social " Determinants of Health     Financial Resource Strain: Not on file   Food Insecurity: Not on file   Transportation Needs: Not on file   Physical Activity: Not on file   Stress: Not on file   Social Connections: Not on file   Intimate Partner Violence: Not on file   Housing Stability: Not on file      Social History     Tobacco Use   Smoking Status Never   Smokeless Tobacco Never     Social History     Substance and Sexual Activity   Alcohol Use Never     Social History     Substance and Sexual Activity   Drug Use Yes    Types: Oral    Comment: cbd/thc, daily for pain - last took 5/3/2023      No family history on file.    Review of Systems   Constitutional:  Positive for malaise/fatigue and weight loss.   All other systems reviewed and are negative.       Objective:   There were no vitals taken for this visit.    Physical Exam  Vitals and nursing note reviewed.   Constitutional:       Appearance: She is ill-appearing.      Comments: She continues to lose weight and appears more anorexic.   HENT:      Head: Normocephalic and atraumatic.      Nose: Nose normal.      Mouth/Throat:      Mouth: Mucous membranes are moist.      Pharynx: Oropharynx is clear.   Eyes:      Extraocular Movements: Extraocular movements intact.      Conjunctiva/sclera: Conjunctivae normal.      Pupils: Pupils are equal, round, and reactive to light.   Cardiovascular:      Rate and Rhythm: Normal rate and regular rhythm.      Pulses: Normal pulses.      Heart sounds: Normal heart sounds.   Pulmonary:      Effort: Pulmonary effort is normal.      Breath sounds: Normal breath sounds.   Abdominal:      General: Abdomen is flat. Bowel sounds are normal.      Palpations: Abdomen is soft.      Comments: Extremely Cachectic   Musculoskeletal:         General: Normal range of motion.      Cervical back: Normal range of motion and neck supple.   Skin:     General: Skin is warm.   Neurological:      General: No focal deficit present.      Mental Status:  She is oriented to person, place, and time.   Psychiatric:         Mood and Affect: Mood normal.         Behavior: Behavior normal.         Thought Content: Thought content normal.         Judgment: Judgment normal.         Labs    Latest Reference Range & Units 07/11/23 05:30   WBC 4.8 - 10.8 K/uL 3.7 (L)   RBC 4.20 - 5.40 M/uL 3.18 (L)   Hemoglobin 12.0 - 16.0 g/dL 10.4 (L)   Hematocrit 37.0 - 47.0 % 30.3 (L)   MCV 81.4 - 97.8 fL 95.3   MCH 27.0 - 33.0 pg 32.7   MCHC 32.2 - 35.5 g/dL 34.3   RDW 35.9 - 50.0 fL 42.1   Platelet Count 164 - 446 K/uL 162 (L)   MPV 9.0 - 12.9 fL 9.9   (L): Data is abnormally low       Latest Reference Range & Units 07/11/23 05:30   Sodium 135 - 145 mmol/L 137   Potassium 3.6 - 5.5 mmol/L 3.8   Chloride 96 - 112 mmol/L 103   Co2 20 - 33 mmol/L 26   Anion Gap 7.0 - 16.0  8.0   Glucose 65 - 99 mg/dL 90   Bun 8 - 22 mg/dL 4 (L)   Creatinine 0.50 - 1.40 mg/dL 0.46 (L)   GFR (CKD-EPI) >60 mL/min/1.73 m 2 104   Calcium 8.5 - 10.5 mg/dL 8.8   (L): Data is abnormally low     Imaging  SMALL BOWEL FOLLOW THROUGH (9/13/23)  IMPRESSION:     1.  Surgical change consistent with prior side-to-side anastomosis with a loop of jejunum and the gastric antrum. There is no evidence of stricture or delay in passage of contrast across the anastomosis.     2.  Normal small bowel follow-through with contrast reaching the colon within 90 minutes.    CT ABDOMEN (7/10/23)  IMPRESSION:  1.  Changes of gastric bypass  2.  Prior cholecystectomy with pneumobilia as before  3.  Small BILATERAL pleural effusions, new since recent outside study  4.  Ascites, increased since the recent outside study  5.  RIGHT upper and LEFT lower quadrant abdominal wall gas, possibly related to medication administration or other intervention  6.  BILATERAL L5 pars defects with grade 1 anterolisthesis of L5 on S1     CT ABDOMEN (5/3/23)  IMPRESSION:  1.  Prior cholecystectomy.  2.  Pneumobilia.  3.  No significant pancreatic  abnormality.  4.  Abdominal aortic atherosclerosis without aneurysm.     NM GASTRIC EMPTYING STUDY (4/27/23)  IMPRESSION:  Gastric emptying halftime measured at 204 minutes consistent with delayed gastric emptying. This previously measured approximately 223.5 minutes        NM GASTRIC EMPTYING STUDY (7/20/21)  FINDINGS:     After observation the activity remaining in the stomach is as follows:     Immediately:  100%     1 hour:  68%     2 hours:   59%     4 hours:   47%     IMPRESSION:     Significant retained gastric activity at 4 hours indicating delayed gastric emptying.     Pathology  N/A     Procedures  SURGERY (5/5/23)  PROCEDURE:  Sinai-en-Y gastrojejunostomy.     PROCEDURE (4/18/23)  GASTROSCOPY - WITH PYLORUS DILATION WITH BOTOX INJECTION - Wound Class: Clean Contaminated  ERCP (ENDOSCOPIC RETROGRADE CHOLANGIOPANCREATOGRAPHY) - Wound Class: Clean Contaminated     Findings:              EGD                          Esophagus                                      Unremarkable mucosa                          Stomach                                      Unremarkable mucosa                                      Narrow caliber pylorus                          Duodenum                                      Unremarkable mucosa                 ERCP                          Ampulla                                      Post sphincterotomy anatomy                          Pancreatic duct                                      Neither injected nor cannulated                          CBD                                      Normal course and caliber                 Therapeutics                          Intersphincteric injection of Botox - 100U divided 4 quadrants                          Hydraulic dilation of pylorus 18-19-20 mm balloon                          10-11-12 mm hydraulic dilation of biliary ampulla                          Balloon sweeps negative for stones or debris     PROCEDURE (8/9/21)  1.   Esophagogastroduodenoscopy with hydraulic dilation of gastric outlet.  2.  Endoscopic ultrasound, upper.  3.  Endoscopic retrograde cholangiopancreatography with biliary   sphincterotomy, bile duct balloon soft stone debris extraction, radiologic   interpretation of static and dynamic fluoroscopic images by myself, at no time   was a radiologist present in the room.     EGD FINDINGS:  1.  Esophagus:  Unremarkable mucosa.  2.  Long and J-shaped stomach, otherwise unremarkable mucosa.  3.  Mildly stenotic pylorus, successfully dilated to 20 mm with balloon.  4.  Duodenum:  Unremarkable mucosa.  Possible extrinsic compression of the   second portion.     ENDOSCOPIC ULTRASOUND FINDINGS:  1.  Celiac axis, no adenopathy.  2.  Pancreatic body and tail, normal parenchyma.  3.  Pancreatic duct, normal course and caliber.  4.  Gallbladder, surgically absent.  5.  Biliary ampulla, unremarkable.  6.  Common bile duct, generous caliber down to the level of the ampulla.    Normal course.  Small subtle nonshadowing filling defect suspicious of stone   versus sludge ball.  7.  Head of pancreas, normal parenchyma and normal dorsal ventral transition.     ERCP FINDINGS:  1.  Biliary ampulla, small, but otherwise unremarkable appearance.  Stenotic   to wire and cannula passage.  2.  Pancreatic duct, neither injected nor cannulated.  3.  Common bile duct, normal course.  Generous caliber.  Small distal filling   defect consistent with small stone.    Diagnosis:     1. Delayed gastric emptying        2. History of Sinai-en-Y gastric bypass        3. Partial gastric outlet obstruction        4. Anorexia nervosa            Medical Decision Making:  Today's Assessment / Status / Plan:     In light of the present findings, the patient's upper GI and small bowel follow-through show no sign of delayed gastric emptying and that the contrast makes it to the colon without difficulty in 90 minutes.  Structurally everything is intact and  functioning.  My concern is that there may be some medical issues related to her malnutrition.  I am referring the patient to Dr. Campos who will see the patient in workup for possible physiological reasons including screw or other reasons she is not gaining weight.  Also I referred the patient to a nutritionist to help in managing her diet and getting her to eat.  And agrees above plan.  We have also sent a written prescription for the Creon so they can send it and get it at their own pharmacy cheaper.  Patient will follow-up with us as needed since there is no surgical issues present.    I, Dr. Cisneros have entered, reviewed and confirmed the above diagnoses related to this patient on this date of service, 12/12/2023 10:08 AM.    She agreed and verbalized her agreement and understanding with the current plan. I answered all questions and concerns she has at this time and advised her to call at any time in the interim with questions or concerns in regards to her care.    Thank you for allowing me to participate in her care, I will continue to follow closely.       Please note that this dictation was created using voice recognition software. I have made every reasonable attempt to correct obvious errors, but I expect that there are errors of grammar and possibly content that I did not discover before finalizing the note.     Thank you for this consultation and allowing me to participate in your patient's care. If I can be of further service please contact my office.      I, Dr Cisneros, have entered, reviewed and confirmed the above diagnoses related to this patient on this date of service, as per the time and date noted at top of this note.

## 2023-12-12 ENCOUNTER — OFFICE VISIT (OUTPATIENT)
Dept: SURGICAL ONCOLOGY | Facility: MEDICAL CENTER | Age: 68
End: 2023-12-12
Payer: MEDICARE

## 2023-12-12 VITALS
HEART RATE: 71 BPM | DIASTOLIC BLOOD PRESSURE: 57 MMHG | OXYGEN SATURATION: 98 % | TEMPERATURE: 97.9 F | WEIGHT: 60 LBS | BODY MASS INDEX: 11.04 KG/M2 | SYSTOLIC BLOOD PRESSURE: 79 MMHG | HEIGHT: 62 IN

## 2023-12-12 DIAGNOSIS — E43 SEVERE PROTEIN-CALORIE MALNUTRITION (HCC): ICD-10-CM

## 2023-12-12 DIAGNOSIS — K30 DELAYED GASTRIC EMPTYING: ICD-10-CM

## 2023-12-12 DIAGNOSIS — K86.1 CHRONIC PANCREATITIS, UNSPECIFIED PANCREATITIS TYPE (HCC): ICD-10-CM

## 2023-12-12 DIAGNOSIS — R63.6 UNDERWEIGHT DUE TO INADEQUATE CALORIC INTAKE: ICD-10-CM

## 2023-12-12 DIAGNOSIS — R63.4 WEIGHT LOSS, NON-INTENTIONAL: ICD-10-CM

## 2023-12-12 DIAGNOSIS — F50.00 ANOREXIA NERVOSA: ICD-10-CM

## 2023-12-12 DIAGNOSIS — F50.01 ANOREXIA NERVOSA, RESTRICTING TYPE: ICD-10-CM

## 2023-12-12 DIAGNOSIS — K86.89 PANCREATIC INSUFFICIENCY: ICD-10-CM

## 2023-12-12 DIAGNOSIS — E46 MALNUTRITION COMPROMISING BODILY FUNCTION (HCC): ICD-10-CM

## 2023-12-12 DIAGNOSIS — Z98.84 HISTORY OF ROUX-EN-Y GASTRIC BYPASS: ICD-10-CM

## 2023-12-12 DIAGNOSIS — K31.1 PARTIAL GASTRIC OUTLET OBSTRUCTION: ICD-10-CM

## 2023-12-12 PROCEDURE — 3074F SYST BP LT 130 MM HG: CPT | Performed by: SURGERY

## 2023-12-12 PROCEDURE — 3078F DIAST BP <80 MM HG: CPT | Performed by: SURGERY

## 2023-12-12 PROCEDURE — 99215 OFFICE O/P EST HI 40 MIN: CPT | Performed by: SURGERY

## 2023-12-12 RX ORDER — OXYCODONE HCL 40 MG/1
40 TABLET, FILM COATED, EXTENDED RELEASE ORAL EVERY 12 HOURS PRN
COMMUNITY

## 2023-12-12 ASSESSMENT — ENCOUNTER SYMPTOMS: WEIGHT LOSS: 1

## 2023-12-12 ASSESSMENT — FIBROSIS 4 INDEX: FIB4 SCORE: 2.78

## 2023-12-20 ENCOUNTER — OFFICE VISIT (OUTPATIENT)
Dept: GASTROENTEROLOGY | Facility: MEDICAL CENTER | Age: 68
End: 2023-12-20
Attending: INTERNAL MEDICINE
Payer: MEDICARE

## 2023-12-20 VITALS
SYSTOLIC BLOOD PRESSURE: 100 MMHG | WEIGHT: 60 LBS | TEMPERATURE: 98 F | BODY MASS INDEX: 10.63 KG/M2 | OXYGEN SATURATION: 92 % | HEIGHT: 63 IN | DIASTOLIC BLOOD PRESSURE: 60 MMHG | RESPIRATION RATE: 18 BRPM | HEART RATE: 66 BPM

## 2023-12-20 DIAGNOSIS — K30 DELAYED GASTRIC EMPTYING: ICD-10-CM

## 2023-12-20 DIAGNOSIS — R63.6 UNDERWEIGHT DUE TO INADEQUATE CALORIC INTAKE: ICD-10-CM

## 2023-12-20 DIAGNOSIS — K86.1 IDIOPATHIC CHRONIC PANCREATITIS (HCC): ICD-10-CM

## 2023-12-20 DIAGNOSIS — Z98.84 HISTORY OF ROUX-EN-Y GASTRIC BYPASS: ICD-10-CM

## 2023-12-20 PROCEDURE — 3078F DIAST BP <80 MM HG: CPT | Performed by: INTERNAL MEDICINE

## 2023-12-20 PROCEDURE — 3074F SYST BP LT 130 MM HG: CPT | Performed by: INTERNAL MEDICINE

## 2023-12-20 PROCEDURE — 99205 OFFICE O/P NEW HI 60 MIN: CPT | Performed by: INTERNAL MEDICINE

## 2023-12-20 ASSESSMENT — FIBROSIS 4 INDEX: FIB4 SCORE: 2.78

## 2023-12-20 ASSESSMENT — ENCOUNTER SYMPTOMS
CARDIOVASCULAR NEGATIVE: 1
WEAKNESS: 1
CONSTIPATION: 1
PSYCHIATRIC NEGATIVE: 1
RESPIRATORY NEGATIVE: 1
MUSCULOSKELETAL NEGATIVE: 1
WEIGHT LOSS: 1
EYES NEGATIVE: 1

## 2023-12-20 NOTE — PROGRESS NOTES
"[facility] Initial Note               Author:  Claudia Oakes M.D. Date & Time Created: 12/20/2023 1:09 PM       Patient ID:  Name:             Kathy Dumont  YOB: 1955  Age:                 68 y.o.  female  MRN:               [id number]        Presenting Chief Complaint:  Chief Complaint   Patient presents with    Weight Loss         History of Present Illness:    This is a very pleasant 68 y.o. female with a history of idiopathic gastroparesis, history of acute, recurrent, idiopathic pancreatitis and chronic pancreatitis, history of anorexia nervosa, possible celiac disease vs. Gluten intolerance, osteoporosis, IgA deficiency, hypothyroidism and most recently Sinai en Y gastric bypass.  I was asked to see her by Dr. Cisneros while patient is trying to get in to see Dr. Cadet.  She believes she may have been terminated from Dr. Cadet's practice.    As a child, she had \"bleeding ulcers, appendectomy age 9, cholecystectomy for unclear reason age 11 and states she had an ulcer at the same time\".  From age 20's to age 40's, she had anorexia nervosa was admitted to 3 different treatment programs.  She worked with a psychotherapist and was able to gain weight.  She feels this is no longer an issue.  She does recall having to be on enteral feeding continuously and she is very fearful of having that happen again.    She has had multiple ERCPs and EUS and pancreas has been described as normal.  She is on Creon but unsure if evidence for pancreatic insufficiency or given empirically.  She may have had some small biliary duct stones several years ago.  She believes that since she has had a sphincterotomy she has not had an episode of acute pancreatitis requiring hospitalization.    She is unable to recall when or how the diagnosis of celiac disease was given but she knows she is intolerant of wheat.  She is intolerant of some fructose containing foods, does fine with " lactose, troubles with legumes (FODMAP).  She has not had previous breath testing for SIBO or IMO.    She is currently consuming 600-700 calories per day and stops because she is full.  She very much enjoys chewing her food and does not want to be on a liquid diet.  Her  expresses concern that if this continues her risk of dying is increased as she only weighs 59 lbs (our scale today with winter clothes and boots was 60 lb)--BMI 10.63    Chart review:  [facility]  [facility]% [facility] at 4 hours    August 2021: EGD with mildly stenotic pylorus, dilated to 20mm balloon    March 2022:  Office visit commenting on ampullary stenosis and had sphincterotomy.  Possibility of sphincter of Oddi raised.  She may have also had 20mm balloon dilation to pylorus.      April 2022:  EGD with Botox 100U intersphincteric injectin and pyloric balloon dilation to 18-19-20mm.  Also ERCP with 10-11-12mm ampullary balloon dilation.    April 2023:  Gastric emptying scan: t 1/2 204 min, previously 223.5 minutes    May 2023:  CT pancreas:  pneumobilia, no biliary or pancreatic ductal dilation, SMA, PV, SMV, splenic vein all patent.  Stomach and proximal duodenum somewhat distended.    May 2023:  Sinai en Y gastrojejunostomy for GOO    July 2023:  CT abd/pelvis with:  small bilateral pleural effusions, pneumobilia, small volume ascites    September 2023:  UGI with SBFT:  anastomosis patent without delay of contrast or stricture, contrast also extends into duodenum, no UNIQUE, contrast reaches the colon in 90 minutes        Review of Systems:  Review of Systems   Constitutional:  Positive for malaise/fatigue and weight loss.   HENT: Negative.     Eyes: Negative.    Respiratory: Negative.     Cardiovascular: Negative.    Gastrointestinal:  Positive for constipation.   Genitourinary: Negative.    Musculoskeletal: Negative.    Skin: Negative.    Neurological:  Positive for weakness.   Endo/Heme/Allergies: Negative.     Psychiatric/Behavioral: Negative.               Past Medical History:  Past Medical History:   Diagnosis Date    Acute recurrent pancreatitis 08/2021    Anesthesia     PONV    Arthritis 08/2021    fingers    Celiac disease     Disorder of thyroid     hypothyroid    Gastroparesis 08/2021    Gluten intolerance            Past Surgical History:  Past Surgical History:   Procedure Laterality Date    GASTROJEJUNOSTOMY N/A 5/5/2023    Procedure: MAIK-EN-Y GASTROJEJUNOSTOMY BYPASS AND EXPLORATORY LAPAORTOMY;  Surgeon: Bradly Cisneros M.D.;  Location: [facility];  Service: General    ND UPPER GI ENDOSCOPY,DIAGNOSIS  4/18/2022    Procedure: GASTROSCOPY - WITH PYLORUS DILATION WITH BOTOX INJECTION;  Surgeon: Rivas Cadet M.D.;  Location: [facility];  Service: Gastroenterology    ND ERCP,DIAGNOSTIC  4/18/2022    Procedure: ERCP (ENDOSCOPIC RETROGRADE CHOLANGIOPANCREATOGRAPHY);  Surgeon: Rivas Cadet M.D.;  Location: [facility];  Service: Gastroenterology    ND UPPER GI ENDOSCOPY,DIAGNOSIS N/A 8/9/2021    Procedure: GASTROSCOPY;  Surgeon: Rivas Cadet M.D.;  Location: [facility];  Service: EUS    ND ERCP,DIAGNOSTIC N/A 8/9/2021    Procedure: ERCP, DIAGNOSTIC;  Surgeon: Rivas Cadet M.D.;  Location: [facility];  Service: EUS    EGD W/ENDOSCOPIC ULTRASOUND N/A 8/9/2021    Procedure: EGD, WITH ENDOSCOPIC US - UPPER RADIAL;  Surgeon: Rivas Cadet M.D.;  Location: [facility];  Service: EUS    ORIF, HUMERUS Left 2006    KNEE ARTHROSCOPY Left 1973    CHOLECYSTECTOMY  1963    APPENDECTOMY CHILD      PRIMARY C SECTION  1984, 1987, 1990         Current Outpatient Medications:  Current Outpatient Medications   Medication Sig    oxyCODONE CR (OXYCONTIN) 40 MG Tablet Extended Release 12 hour Abuse-Deterrent tablet Take 40 mg by mouth every 12 hours.    Pancrelipase, Lip-Prot-Amyl, 16536-950755 units Cap DR Particles Take  "2 Capsules by mouth 3 times a day with meals.    Pancrelipase, Lip-Prot-Amyl, (CREON) 60379-416137 units Cap DR Particles Take 2 Capsules by mouth 3 times a day. Take 2 capsules per each meal, and 1 capsule per snack    CALCIUM PO Take 1 Tablet by mouth every day.    multivitamin Tab Take 1 Tablet by mouth every day.    ondansetron (ZOFRAN) 4 MG Tab tablet Take 4 mg by mouth every 8 hours as needed for Nausea/Vomiting.    levothyroxine (SYNTHROID) 50 MCG Tab Take 50 mcg by mouth every morning on an empty stomach.         Medication Allergies:  Allergies   Allergen Reactions    Hydrocodone-Acetaminophen Rash     Rash, \"I can tolerate in small amounts\"      Loratadine-Pseudoephedrine Rash     rash    Sertraline Hcl Palpitations    Tramadol Vomiting     Fainting    Zoloft Rash     rash    Codeine Vomiting    Gluten Meal      \"violent stomach upset for 4-5 days\"         Family Medical History:  No family history on file.      Social History:  Social History     Tobacco Use    Smoking status: Never    Smokeless tobacco: Never   Vaping Use    Vaping Use: Never used   Substance Use Topics    Alcohol use: Never    Drug use: Yes     Types: Oral     Comment: cbd/thc, daily for pain - last took 5/3/2023         Vital signs:  /60 (BP Location: Left arm, Patient Position: Sitting, BP Cuff Size: Small adult)   Pulse 66   Temp 36.7 °C (98 °F) (Temporal)   Resp 18   Ht 1.6 m (5' 3\")   Wt 27.2 kg (60 lb)   SpO2 92%   BMI 10.63 kg/m²       Physical Exam:  Physical Exam  Constitutional:       Comments: Cachetic 69yo female, no distress, actively participates in questioning   HENT:      Head: Normocephalic and atraumatic.      Nose: Nose normal.      Mouth/Throat:      Mouth: Mucous membranes are moist.   Eyes:      General: No scleral icterus.     Pupils: Pupils are equal, round, and reactive to light.   Cardiovascular:      Rate and Rhythm: Normal rate and regular rhythm.   Pulmonary:      Effort: Pulmonary effort is " "normal.      Breath sounds: Normal breath sounds.   Abdominal:      Palpations: Abdomen is soft. There is no mass.      Tenderness: There is no abdominal tenderness.      Comments: Mildly distended, tympany   Musculoskeletal:         General: No swelling. Normal range of motion.      Cervical back: Neck supple.   Lymphadenopathy:      Cervical: No cervical adenopathy.   Skin:     General: Skin is warm and dry.      Coloration: Skin is not jaundiced.   Neurological:      Mental Status: She is oriented to person, place, and time.   Psychiatric:         Mood and Affect: Mood normal.         Behavior: Behavior normal.         Thought Content: Thought content normal.         Judgment: Judgment normal.             Labs:  No results for input(s): \"SODIUM\", \"POTASSIUM\", \"CHLORIDE\", \"CO2\", \"BUN\", \"CREATININE\", \"MAGNESIUM\", \"PHOSPHORUS\", \"CALCIUM\" in the last 72 hours.  No results for input(s): \"ALTSGPT\", \"ASTSGOT\", \"ALKPHOSPHAT\", \"TBILIRUBIN\", \"DBILIRUBIN\", \"GAMMAGT\", \"AMYLASE\", \"LIPASE\", \"ALB\", \"PREALBUMIN\", \"GLUCOSE\" in the last 72 hours.      No results for input(s): \"RBC\", \"HEMOGLOBIN\", \"HEMATOCRIT\", \"PLATELETCT\", \"PROTHROMBTM\", \"APTT\", \"INR\", \"IRON\", \"FERRITIN\", \"TOTIRONBC\" in the last 72 hours.  No results found for this or any previous visit (from the past 24 hour(s)).    Results for orders placed during the hospital encounter of 09/13/23    DX-UPPER GI-SMALL BOWEL FOLLOW THRU    Narrative  9/13/2023 8:22 AM    HISTORY/REASON FOR EXAM:  Delayed gastric emptying with Sinai-en-Y procedure performed. Persistent weight loss.      TECHNIQUE/EXAM DESCRIPTION AND NUMBER OF VIEWS:  Single contrast upper GI series with small bowel follow-through.    COMPARISON:  None available.    FINDINGS:     view of the abdomen demonstrates surgical changes within the abdomen..    Multiple images were obtained following oral administration of contrast in a single contrast fashion. The patient had limited tolerance of oral barium and " therefore a single contrast study was performed.    The esophagus is normal in appearance. There is no evidence of stricture or mass. There is no evidence of hiatal hernia.  The stomach and duodenum are normal.  No evidence of mass or peptic ulcer. There are surgical changes consistent with side-to-side anastomosis of a loop of jejunum to the gastric body. The anastomosis is patent without delay of contrast or evidence of  stricture. Contrast also extends into the duodenum which is normal.  No gastroesophageal reflux is seen during the course of the study.    Additional images were obtained to follow the course of barium into the colon. Contrast reaches the colon within 90 minutes. There is no evidence of focally fixed, dilated, , or angulated loops of bowel. Spot views of the terminal ileum are  normal. 28 fluoroscopic images obtained. A total of 1.5 minutes fluoroscopy time used.    Impression  1.  Surgical change consistent with prior side-to-side anastomosis with a loop of jejunum and the gastric antrum. There is no evidence of stricture or delay in passage of contrast across the anastomosis.    2.  Normal small bowel follow-through with contrast reaching the colon within 90 minutes.      Results for orders placed during the hospital encounter of 07/06/23    CT-ABDOMEN-PELVIS WITH    Narrative  7/10/2023 1:14 PM    HISTORY/REASON FOR EXAM:  hx Sinai en Y gastric bypass 5/5/23; abdominal pain eval.      TECHNIQUE/EXAM DESCRIPTION:   CT scan of the abdomen and pelvis with contrast.    Contrast-enhanced helical scanning was obtained from the diaphragmatic domes through the pubic symphysis following the bolus administration of nonionic contrast without complication.    100 mL of Omnipaque 350 nonionic contrast was administered without complication.    Low dose optimization technique was utilized for this CT exam including automated exposure control and adjustment of the mA and/or kV according to patient  size.    COMPARISON: Outside CT from 7/6/2023 for which no report is available; study from here 5/3/2023    FINDINGS:  Lower Chest: Small BILATERAL pleural effusions with overlying atelectasis.    Liver: Normal.    Spleen: Unremarkable.    Pancreas: Unremarkable.    Gallbladder: Surgically absent.    Biliary: There is pneumobilia, as before.    Adrenal glands: Normal.    Kidneys: Unremarkable without hydronephrosis.    Bowel: No obstruction or acute inflammation. There are changes of gastric bypass    Lymph nodes: No adenopathy.    Vasculature: Unremarkable.    Peritoneum: There is now a small volume of ascites..    Musculoskeletal: No acute or destructive process. There is trace gas in the anterior abdominal wall of the LEFT lower and RIGHT upper quadrants. There are BILATERAL L5 pars defects with grade 1 anterolisthesis of L5 on S1.    Pelvis: No adenopathy or free fluid.    Impression  1.  Changes of gastric bypass  2.  Prior cholecystectomy with pneumobilia as before  3.  Small BILATERAL pleural effusions, new since recent outside study  4.  Ascites, increased since the recent outside study  5.  RIGHT upper and LEFT lower quadrant abdominal wall gas, possibly related to medication administration or other intervention  6.  BILATERAL L5 pars defects with grade 1 anterolisthesis of L5 on S1      Results for orders placed during the hospital encounter of 04/27/23    NM-GASTRIC EMPTYING    Narrative  4/27/2023 2:49 PM    HISTORY/REASON FOR EXAM:  Gastroparesis.      TECHNIQUE/EXAM DESCRIPTION AND NUMBER OF VIEWS:  0.577 uCi Tc 99m sulfur colloid was administered orally mixed with eggs. 90 minutes of planar imaging was then performed.    COMPARISON: 7/20/2021    FINDINGS:  There was no evidence of gastroesophageal reflux during 60 minutes of continuous observation.  Half time of gastric emptying is measured at 204 minutes.    Impression  Gastric emptying halftime measured at 204 minutes consistent with delayed gastric  emptying. This previously measured approximately 223.5 minutes      Results for orders placed during the hospital encounter of 07/20/21    NM-GASTRIC EMPTYING-TOUGAS METHOD    Narrative  7/20/2021 2:31 PM    HISTORY/REASON FOR EXAM:  Epigastric abdominal pain, pancreatitis, underweight.    TECHNIQUE/EXAM DESCRIPTION AND NUMBER OF VIEWS:  1.03 mCi Tc 99m sulfur colloid was administered orally as a Tougas.    Planar images were obtained, and residual gastric activity was calculated immediately, and at 1, 2, and 4 hours.    Tougas Technique Normal Values:  Delayed gastric emptying is defined as gastric retention of greater than 10% at 4 hours.  If the patient is unable to complete the 4 hour test, but had no episodes of vomiting, delayed gastric emptying is defined as  greater than 60% retention at 2 hours.    COMPARISON: MRI abdomen 5 9/2/2021    FINDINGS:    After observation the activity remaining in the stomach is as follows:    Immediately:  100%    1 hour:  68%    2 hours:   59%    4 hours:   47%    Impression  Significant retained gastric activity at 4 hours indicating delayed gastric emptying.      Results for orders placed during the hospital encounter of 05/09/21    MR-ABDOMEN-WITH & W/O    Narrative  5/9/2021 3:55 PM    HISTORY/REASON FOR EXAM:  History of chronic pancreatitis, gastroparesis and celiac disease. History of abdominal pain. Follow-up.    TECHNIQUE/EXAM DESCRIPTION: MRI of the liver with dynamic IV gadolinium enhancement.    MR imaging of the liver was performed. MR images of the liver were obtained with coronal single-shot fast spin-echo T2, fat-suppressed axial FRFSE T2, axial in-phase and out-of-phase FSPGR T1, precontrast fat-suppressed FSPGR T1, dynamic gadolinium  enhanced axial fat-suppressed T1 FSPGR in the arterial dominant, portal venous, and 2-minute and 4-minute delayed phases, with delayed coronal fat-suppressed T1 FSPGR sequence as well as DWI with ADC map.    3-D MRCP images were  also obtained, with reformats generated and reviewed.    The study was performed on a [employer] 1.5 Candi MRI scanner.    6 mL ProHance contrast was administered intravenously.    COMPARISON: None.    FINDINGS:    Lower chest: Lung bases are clear. Bilateral breast implants.    Liver: Normal hepatic signal and contour. No mass. No intrahepatic biliary dilatation.    Gallbladder: No mural thickening. No gallstones.    Common bile duct: Prominent, but likely appropriate for patient's age. No CBD stones.    Pancreas: Homogeneous pancreatic signal. No pancreatic ductal dilatation. No pancreatic mass lesions..    Spleen: No mass.    Adrenals: No mass.    Kidneys: No suspicious mass lesions. No hydronephrosis.    Stomach, small bowel, colon: No bowel wall thickening or obstruction.    Peritoneal cavity: No ascites.    Lymph nodes: No enlarged nodes by size criteria.    Aorta: No aneurysm.    Musculoskeletal structures: Preserved bone marrow signal. Susceptibility artifact in the abdominal wall.    Impression  1.  Unremarkable pancreas. No pancreatic mass lesions.  2.  No acute abnormality within the abdomen.       MDM (Data Review):   -Records reviewed and summarized in current documentation  -I personally reviewed and interpreted the laboratory results  -I personally reviewed the radiology images    Assessment/Recommendations:    IMPRESSION:   Severe protein calorie malnutrition.  She is fearful of overeating and having abdominal discomfort.  She would like to gain weight but states she absolutely cannot eat more than she is  2.    Early satiety.    3.    S/p Sinai en Y gastric bypass.  Sinai limb placed posterior wall of stomach extending from beginning of antrum to pylorus.    4.    Hx gastroparesis, idiopathic.  Underwent pre-pyloric Botox on several occasions  5.    Hx chronic pancreatitis, idiopathic.  No pancreatic calcifictions. No history of alcohol abuse. No FHx pancreatitis.  No hypertriglyceridemia.  No culprit  medications.  5.    Pyloric stenosis.  Described as narrow caliber but not obstuctive 4/22 and dilated to 20mm  6.    Query celiac disease.  Regardless, adheres to strict GFD and may be gluten intolerant.  Biopsies from 2013 were normal.  7.    Bloating.  ?SIBO  8.    Constipation.  ?IMO  9.    Hx anorexia nervosa      Recs:  We had a long discussion regarding enteral feeding to supplement her diet or to be her main source of nutrition on days she is unable to eat.  I am suspicious that anything entering her stomach makes her feel full and she won't take anything further.  ??would she be better off with a jejunal feeding tube.  We discussed distal SIBO as a cause of bloating yet she has constipation which is more suggestive of IMO.  Recommended she refamiliarize herself with low FODMAP diet and I reviewed various foods in each column.  I will contact Dr. Cisneros and Dr. Cadet regarding gastric vs. Jejunal feeding tube.  However, Kathy needs to think about this and we will reach out to her in the next few days to see what she has decided.  I  have stressed that we are not taking away her ability to eat but she is unable to eat more than 600 calories per day and needs higher calorie intake.  I suggest she add Bene-calorie and/or Bene-protein to increase her caloric intake  We do not have the ability to perform hydrogen and methane breath testing at this time.  I am not comfortable treating her empirically with antibiotics.  If she contracted C diff, it could be very serious.  This can be readdressed after she has had weight gain.      Claudia Oakes M.D.

## 2023-12-22 DIAGNOSIS — E43 SEVERE PROTEIN-CALORIE MALNUTRITION (HCC): ICD-10-CM

## 2023-12-22 DIAGNOSIS — R63.0 ANOREXIA: ICD-10-CM

## 2023-12-22 DIAGNOSIS — Z98.84 HISTORY OF ROUX-EN-Y GASTRIC BYPASS: ICD-10-CM

## 2023-12-22 DIAGNOSIS — R63.6 UNDERWEIGHT DUE TO INADEQUATE CALORIC INTAKE: ICD-10-CM

## 2024-01-12 ENCOUNTER — TELEPHONE (OUTPATIENT)
Dept: GASTROENTEROLOGY | Facility: MEDICAL CENTER | Age: 69
End: 2024-01-12
Payer: MEDICARE

## 2024-01-12 NOTE — TELEPHONE ENCOUNTER
"Called and spoke to pt. She had an EKG and echo done this past week at [facility] in [place], [place] with Dr Lorenz. Spoke with HIM staff who requested release of info form be faxed to obtain results, this was completed.     Pt stated she is \"hanging in there\" but has lost another pound. Informed pt echo/EKG results will be reviewed with Dr Oakes and will be in touch with pt early next week to discuss procedure scheduling for G/J tube. Pt confirmed she has her dietician appt on 1/24/24.   "

## 2024-01-17 ENCOUNTER — APPOINTMENT (OUTPATIENT)
Dept: ADMISSIONS | Facility: MEDICAL CENTER | Age: 69
End: 2024-01-17
Attending: INTERNAL MEDICINE
Payer: MEDICARE

## 2024-01-23 ENCOUNTER — PRE-ADMISSION TESTING (OUTPATIENT)
Dept: ADMISSIONS | Facility: MEDICAL CENTER | Age: 69
End: 2024-01-23
Attending: INTERNAL MEDICINE
Payer: MEDICARE

## 2024-01-24 ENCOUNTER — NON-PROVIDER VISIT (OUTPATIENT)
Dept: INTERNAL MEDICINE | Facility: OTHER | Age: 69
End: 2024-01-24
Payer: MEDICARE

## 2024-01-24 VITALS — WEIGHT: 58.8 LBS | BODY MASS INDEX: 10.42 KG/M2

## 2024-01-24 DIAGNOSIS — Z98.84 STATUS POST BARIATRIC SURGERY: ICD-10-CM

## 2024-01-24 DIAGNOSIS — E43 SEVERE MALNUTRITION (HCC): ICD-10-CM

## 2024-01-24 DIAGNOSIS — K31.84 GASTROPARESIS: ICD-10-CM

## 2024-01-24 DIAGNOSIS — R63.0 ANOREXIA: ICD-10-CM

## 2024-01-24 DIAGNOSIS — R63.6 UNDERWEIGHT: ICD-10-CM

## 2024-01-24 DIAGNOSIS — Z98.84 HISTORY OF ROUX-EN-Y GASTRIC BYPASS: ICD-10-CM

## 2024-01-24 PROCEDURE — 97802 MEDICAL NUTRITION INDIV IN: CPT | Performed by: DIETITIAN, REGISTERED

## 2024-01-24 ASSESSMENT — FIBROSIS 4 INDEX: FIB4 SCORE: 2.78

## 2024-01-24 NOTE — PATIENT INSTRUCTIONS
Goals:  Add 1 tbsp of MCT oil to your coffee in the morning. Also add in a couple of slices of fruit to breakfast   Add in 1 slice of mozzarella cheese to lunch with your crackers   Increase your protein portion to 2 oz at dinner or 3 chicken nuggets instead of 2   I will call your nurse to share with her my recommendation to start with Peptamen 1.5 and to have you bolus 30 mL every hour to get in 240 mL per DAY to start.  Do at least 30mL of water flushes after each bolus to support your hydration

## 2024-01-26 ENCOUNTER — DOCUMENTATION (OUTPATIENT)
Dept: RADIATION ONCOLOGY | Facility: MEDICAL CENTER | Age: 69
End: 2024-01-26
Payer: MEDICARE

## 2024-01-26 NOTE — PROGRESS NOTES
Nutrition Services: Brief note    RD assistance requested in setting pt for DME supply for TF. RD able to compile, collaborated with YOUSUF Zarate. Pt will continue to see DOLLY Pepper for nutrition care, as pt does not meet eligibility to be seen by this RD.     RD remaining signed off

## 2024-01-26 NOTE — DISCHARGE PLANNING
Per Jina, RN, tube feeding referral sent to [facility] today and they have sent educational info to patient as well. Patient lives in [place], unsure if there are home health services in that area but a registered dietician, Kat, will follow for nutrition care.

## 2024-01-30 ENCOUNTER — HOSPITAL ENCOUNTER (OUTPATIENT)
Facility: MEDICAL CENTER | Age: 69
End: 2024-01-30
Attending: INTERNAL MEDICINE | Admitting: INTERNAL MEDICINE
Payer: MEDICARE

## 2024-01-30 ENCOUNTER — ANESTHESIA (OUTPATIENT)
Dept: SURGERY | Facility: MEDICAL CENTER | Age: 69
End: 2024-01-30
Payer: MEDICARE

## 2024-01-30 ENCOUNTER — ANESTHESIA EVENT (OUTPATIENT)
Dept: SURGERY | Facility: MEDICAL CENTER | Age: 69
End: 2024-01-30
Payer: MEDICARE

## 2024-01-30 VITALS
SYSTOLIC BLOOD PRESSURE: 129 MMHG | WEIGHT: 58.64 LBS | BODY MASS INDEX: 10.39 KG/M2 | HEIGHT: 63 IN | TEMPERATURE: 97 F | DIASTOLIC BLOOD PRESSURE: 70 MMHG | RESPIRATION RATE: 9 BRPM | HEART RATE: 57 BPM | OXYGEN SATURATION: 97 %

## 2024-01-30 PROCEDURE — 160046 HCHG PACU - 1ST 60 MINS PHASE II: Performed by: INTERNAL MEDICINE

## 2024-01-30 PROCEDURE — 700105 HCHG RX REV CODE 258: Performed by: ANESTHESIOLOGY

## 2024-01-30 PROCEDURE — 160028 HCHG SURGERY MINUTES - 1ST 30 MINS LEVEL 3: Performed by: INTERNAL MEDICINE

## 2024-01-30 PROCEDURE — 700111 HCHG RX REV CODE 636 W/ 250 OVERRIDE (IP): Mod: JZ | Performed by: ANESTHESIOLOGY

## 2024-01-30 PROCEDURE — A9270 NON-COVERED ITEM OR SERVICE: HCPCS | Performed by: ANESTHESIOLOGY

## 2024-01-30 PROCEDURE — 160002 HCHG RECOVERY MINUTES (STAT): Performed by: INTERNAL MEDICINE

## 2024-01-30 PROCEDURE — 160048 HCHG OR STATISTICAL LEVEL 1-5: Performed by: INTERNAL MEDICINE

## 2024-01-30 PROCEDURE — 160039 HCHG SURGERY MINUTES - EA ADDL 1 MIN LEVEL 3: Performed by: INTERNAL MEDICINE

## 2024-01-30 PROCEDURE — 160035 HCHG PACU - 1ST 60 MINS PHASE I: Performed by: INTERNAL MEDICINE

## 2024-01-30 PROCEDURE — 160025 RECOVERY II MINUTES (STATS): Performed by: INTERNAL MEDICINE

## 2024-01-30 PROCEDURE — 160009 HCHG ANES TIME/MIN: Performed by: INTERNAL MEDICINE

## 2024-01-30 PROCEDURE — 160047 HCHG PACU  - EA ADDL 30 MINS PHASE II: Performed by: INTERNAL MEDICINE

## 2024-01-30 PROCEDURE — 700102 HCHG RX REV CODE 250 W/ 637 OVERRIDE(OP): Performed by: ANESTHESIOLOGY

## 2024-01-30 PROCEDURE — 43246 EGD PLACE GASTROSTOMY TUBE: CPT | Mod: 22,80 | Performed by: INTERNAL MEDICINE

## 2024-01-30 DEVICE — IMPLANTABLE DEVICE: Type: IMPLANTABLE DEVICE | Site: ESOPHAGUS | Status: FUNCTIONAL

## 2024-01-30 RX ORDER — ONDANSETRON 2 MG/ML
4 INJECTION INTRAMUSCULAR; INTRAVENOUS
Status: DISCONTINUED | OUTPATIENT
Start: 2024-01-30 | End: 2024-01-30 | Stop reason: HOSPADM

## 2024-01-30 RX ORDER — SODIUM CHLORIDE, SODIUM LACTATE, POTASSIUM CHLORIDE, CALCIUM CHLORIDE 600; 310; 30; 20 MG/100ML; MG/100ML; MG/100ML; MG/100ML
INJECTION, SOLUTION INTRAVENOUS CONTINUOUS
Status: DISCONTINUED | OUTPATIENT
Start: 2024-01-30 | End: 2024-01-30 | Stop reason: HOSPADM

## 2024-01-30 RX ORDER — DIPHENHYDRAMINE HYDROCHLORIDE 50 MG/ML
12.5 INJECTION INTRAMUSCULAR; INTRAVENOUS
Status: DISCONTINUED | OUTPATIENT
Start: 2024-01-30 | End: 2024-01-30 | Stop reason: HOSPADM

## 2024-01-30 RX ORDER — CEFAZOLIN SODIUM 1 G/3ML
INJECTION, POWDER, FOR SOLUTION INTRAMUSCULAR; INTRAVENOUS PRN
Status: DISCONTINUED | OUTPATIENT
Start: 2024-01-30 | End: 2024-01-30 | Stop reason: SURG

## 2024-01-30 RX ORDER — HALOPERIDOL 5 MG/ML
1 INJECTION INTRAMUSCULAR
Status: DISCONTINUED | OUTPATIENT
Start: 2024-01-30 | End: 2024-01-30 | Stop reason: HOSPADM

## 2024-01-30 RX ORDER — SODIUM CHLORIDE, SODIUM LACTATE, POTASSIUM CHLORIDE, CALCIUM CHLORIDE 600; 310; 30; 20 MG/100ML; MG/100ML; MG/100ML; MG/100ML
INJECTION, SOLUTION INTRAVENOUS
Status: DISCONTINUED | OUTPATIENT
Start: 2024-01-30 | End: 2024-01-30 | Stop reason: SURG

## 2024-01-30 RX ORDER — SODIUM CHLORIDE, SODIUM LACTATE, POTASSIUM CHLORIDE, CALCIUM CHLORIDE 600; 310; 30; 20 MG/100ML; MG/100ML; MG/100ML; MG/100ML
INJECTION, SOLUTION INTRAVENOUS CONTINUOUS
Status: ACTIVE | OUTPATIENT
Start: 2024-01-30 | End: 2024-01-30

## 2024-01-30 RX ORDER — PHENYLEPHRINE HYDROCHLORIDE 10 MG/ML
INJECTION, SOLUTION INTRAMUSCULAR; INTRAVENOUS; SUBCUTANEOUS PRN
Status: DISCONTINUED | OUTPATIENT
Start: 2024-01-30 | End: 2024-01-30 | Stop reason: SURG

## 2024-01-30 RX ORDER — ONDANSETRON 2 MG/ML
INJECTION INTRAMUSCULAR; INTRAVENOUS PRN
Status: DISCONTINUED | OUTPATIENT
Start: 2024-01-30 | End: 2024-01-30 | Stop reason: SURG

## 2024-01-30 RX ADMIN — PROPOFOL 30 MG: 10 INJECTION, EMULSION INTRAVENOUS at 07:46

## 2024-01-30 RX ADMIN — SODIUM CHLORIDE, POTASSIUM CHLORIDE, SODIUM LACTATE AND CALCIUM CHLORIDE: 600; 310; 30; 20 INJECTION, SOLUTION INTRAVENOUS at 07:39

## 2024-01-30 RX ADMIN — HYDROCODONE BITARTRATE AND ACETAMINOPHEN 7.5 MG: 7.5; 325 SOLUTION ORAL at 09:22

## 2024-01-30 RX ADMIN — ONDANSETRON 4 MG: 2 INJECTION INTRAMUSCULAR; INTRAVENOUS at 07:53

## 2024-01-30 RX ADMIN — PROPOFOL 20 MG: 10 INJECTION, EMULSION INTRAVENOUS at 08:17

## 2024-01-30 RX ADMIN — FENTANYL CITRATE 25 MCG: 50 INJECTION, SOLUTION INTRAMUSCULAR; INTRAVENOUS at 07:47

## 2024-01-30 RX ADMIN — FENTANYL CITRATE 25 MCG: 50 INJECTION, SOLUTION INTRAMUSCULAR; INTRAVENOUS at 09:22

## 2024-01-30 RX ADMIN — PHENYLEPHRINE HYDROCHLORIDE 100 MCG: 10 INJECTION INTRAVENOUS at 07:53

## 2024-01-30 RX ADMIN — PROPOFOL 100 MCG/KG/MIN: 10 INJECTION, EMULSION INTRAVENOUS at 07:49

## 2024-01-30 RX ADMIN — CEFAZOLIN 1 G: 1 INJECTION, POWDER, FOR SOLUTION INTRAMUSCULAR; INTRAVENOUS at 07:45

## 2024-01-30 ASSESSMENT — PAIN DESCRIPTION - PAIN TYPE
TYPE: SURGICAL PAIN

## 2024-01-30 ASSESSMENT — PAIN SCALES - GENERAL: PAIN_LEVEL: 0

## 2024-01-30 ASSESSMENT — FIBROSIS 4 INDEX: FIB4 SCORE: 2.78

## 2024-01-30 NOTE — DISCHARGE INSTRUCTIONS
Nothing by Mouth x 12 hours.  May take small sips and advance slowly.  Prescription for Peptamen AF 1.5 15 - 30 mLs via PEG every 2 hours until her machine arrives.   Must avoid enteral feeding 3 hours before bedtime while using bolus method   When tube feeding machine arrives, must keep your head of bed elevated at 30 - 45 degrees to avoid aspiration in case tube should become dislodged back into stomach     If any questions arise, call your provider.  If your provider is not available, please feel free to call the Surgical Center at [phone].    MEDICATIONS: Resume taking daily medication.  Take prescribed pain medication with food.  If no medication is prescribed, you may take non-aspirin pain medication if needed.  PAIN MEDICATION CAN BE VERY CONSTIPATING.  Take a stool softener or laxative such as senokot, pericolace, or milk of magnesia if needed.    Last pain medication given at 9:22 am (Hycet)      What to Expect Post Anesthesia    Rest and take it easy for the first 24 hours.  A responsible adult is recommended to remain with you during that time.  It is normal to feel sleepy.  We encourage you to not do anything that requires balance, judgment or coordination.    FOR 24 HOURS DO NOT:  Drive, operate machinery or run household appliances.  Drink beer or alcoholic beverages.  Make important decisions or sign legal documents.    To avoid nausea, slowly advance diet as tolerated, avoiding spicy or greasy foods for the first day.  Add more substantial food to your diet according to your provider's instructions.  Babies can be fed formula or breast milk as soon as they are hungry.  INCREASE FLUIDS AND FIBER TO AVOID CONSTIPATION.    MILD FLU-LIKE SYMPTOMS ARE NORMAL.  YOU MAY EXPERIENCE GENERALIZED MUSCLE ACHES, THROAT IRRITATION, HEADACHE AND/OR SOME NAUSEA.

## 2024-01-30 NOTE — ANESTHESIA POSTPROCEDURE EVALUATION
Patient: Kathy Dumont    Procedure Summary       Date: 01/30/24 Room / Location: [facility] ROOM 26 / SURGERY SAME DAY [facility]    Anesthesia Start: 0739 Anesthesia Stop: 0901    Procedures:       PERCUTANEOUS ENDOSCOPIC GASTROSTOMY WITH TUBE PLACEMENT, JEJUNOSTOMY TUBE (Esophagus)      GASTROSCOPY (Esophagus) Diagnosis: (S/P PEG AND JEJUNOSTOMY TUBE PLACEMENT)    Surgeons: Claudia Oakes M.D. Responsible Provider: Nishant David M.D.    Anesthesia Type: general ASA Status: 4            Final Anesthesia Type: general  Last vitals  BP   Blood Pressure : 101/58    Temp   36.3 °C (97.3 °F)    Pulse   (!) 58   Resp   19    SpO2   98 %      Anesthesia Post Evaluation    Patient location during evaluation: PACU  Patient participation: complete - patient participated  Level of consciousness: awake and alert  Pain score: 0    Airway patency: patent  Anesthetic complications: no  Cardiovascular status: adequate and hemodynamically stable  Respiratory status: acceptable  Hydration status: acceptable    PONV: none          No notable events documented.     Nurse Pain Score: 2 (NPRS)

## 2024-01-30 NOTE — ANESTHESIA PREPROCEDURE EVALUATION
Case: [id number] Date/Time: 01/30/24 0715    Procedures:       PERCUTANEOUS ENDOSCOPIC GASTROSTOMY WITH TUBE PLACEMENT, JEJUNOSTOMY TUBE (Esophagus)      GASTROSCOPY (Esophagus)    Anesthesia type: MAC    Pre-op diagnosis: SEVERE PROTEIN CALORIE MALNUTRITION    Location: [facility] ROOM 26 / SURGERY SAME DAY [facility]    Surgeons: Claudia Oakes M.D.            Relevant Problems   ANESTHESIA   (positive) PONV (postoperative nausea and vomiting)      ENDO   (positive) Hypothyroidism       Physical Exam    Airway   Mallampati: II  TM distance: >3 FB  Neck ROM: full       Cardiovascular - normal exam  Rhythm: regular  Rate: normal  (-) murmur     Dental - normal exam           Pulmonary - normal exam  Breath sounds clear to auscultation     Abdominal    Neurological - normal exam                   Anesthesia Plan    ASA 4       Plan - general       Airway plan will be natural airway          Induction: intravenous    Postoperative Plan: Postoperative administration of opioids is intended.    Pertinent diagnostic labs and testing reviewed    Informed Consent:    Anesthetic plan and risks discussed with patient.    Use of blood products discussed with: patient whom consented to blood products.

## 2024-01-30 NOTE — PROCEDURES
OPERATIVE REPORT    PATIENT:   Kathy Dumont   1955       PREOPERATIVE DIAGNOSES/INDICATIONS: Ongoing weight loss with BMI 10.39, gastroparesis    POSTOPERATIVE DIAGNOSIS: Gastrojejunostomy patent, 24 Lithuanian PEG placement with feeding tube extension into duodenum    PROCEDURE:  ESOPHAGOGASTRODUODENOSCOPY with PEG-JET    PHYSICIAN:  Claudia Oakes MD  Assistant:  Davin Garces MD    ANESTHESIA:  Per anesthesiologist.    LOCATION: [facility]    CONSENT:  OBTAINED. The risks, benefits and alternatives of the procedure were discussed in details. The risks include and are not limited to bleeding, infection, perforation, missed lesions, and sedations risks (cardiopulmonary compromise and allergic reaction to medications).    DESCRIPTION:   After routine checkup was performed, patient was brought into the endoscopy suite.  Patient was kept in the supine positon with the head of bed elevated.  . A bite block was placed in patient's mouth. Patient was sedated by anesthesia.  Vital signs were monitored throughout the procedure.  Oxygenation support was provided throughout the procedure. Upper endoscope was inserted into patient's mouth and advanced to the second portion of the duodenum under direct visualization and was advanced past into gastrojejunal anastomosis.      Once the site was reached and examined, the upper endoscope was withdrawn.  Retroflexion was made within the stomach.  The stomach was decompressed, scope was withdrawn and the procedure was terminated.     The patient tolerated the procedure well.  There were no immediate complications.    OPERATIVE FINDINGS:    1. Esophagus: normal  2. Stomach: gastrojejunostomy with surrounding erythema but no ulceration or erosion.  Pylorus patent.  3. Duodenum: Somewhat tortuous but mucosal normal    The room was dark and and with the endoscope in the stomach transillumination was noted in the lower abdomen.  The endoscope was pulled back to the mid  stomach and transillumination was now seen in the left upper quadrant.  Finger indentation into the gastric lumen was appreciated.  The area was prepped and draped.  Lidocaine 1% #5 cc local anesthesia given.  1 cm transverse incision was made.  The introducer needle was advanced very easily into the gastric lumen.  A guidewire was advanced which was then grasped by the snare.  The snare guidewire endoscope combination was pulled out through the mouth.  The 20 Irish PEG was attached to the guidewire and pulled into position.  A 12 Irish jejunal extension tube was very difficult to advance through to PEG tube.  This was removed and an 8.5 Irish jejunal extension tube was easily advanced through the PEG.  The suture was classed and it was advanced to approximately third portion of the duodenum.  I was unable to advance any further due to the tortuosity of the small bowel.  Unfortunately the connection between the PEG and the jejunal extension tube was not tight and would have led to leaking      Guidewire was advanced through the 20 Fr PEG and grabbed with a snare.  The snare, guidewire and endoscope were pulled back out through the mouth.  The 20 Fr PEG was removed with traction.  The 24 Fr PEG was attached to the guidewire and pulled into position.  The 12 Irish jejunal extension tube was advanced easily through the PEG.  The suture was grasped with the Hemoclip and after multiple attempts was positioned into the approximate third portion of the duodenum.  The suture was attached to the mucosa.  The endoscope was carefully pulled back into the stomach where some coiling of the extension to was seen in the stomach.    Antibiotic ointment was placed to the site which was dressed.  The skin divya was 2 cm.    90 minute procedure time (60 minutes longer than standard procedure time)      RECOMMENDATIONS:  NPO x 12 hours  May start Peptamen AF 1.5 15-30 cc via extension tube   Patient to receive feeding machine via  [facility].  I have given instructions to her  that she should not lie flat while receiving nocturnal tube feeding.

## 2024-01-30 NOTE — ANESTHESIA TIME REPORT
Anesthesia Start and Stop Event Times       Date Time Event    1/30/2024 0721 Ready for Procedure     0739 Anesthesia Start     0901 Anesthesia Stop          Responsible Staff  01/30/24      Name Role Begin End    Nishant David M.D. Anesth 0739 0901          Overtime Reason:  no overtime (within assigned shift)    Comments:

## 2024-01-30 NOTE — OR NURSING
0859- Pt to PACU 22 from Endo. Bedside report from anesthesiologist and RN.  Attached to monitoring, VSS, breathing is calm and unlabored, Patient is asleep currently. Pt has a dressing on anterior portion of abdomen and CDI. Remains on 6 L oxygen via mask.    0922- Patient having 6/10 pain at this time. Medicated per MAR.    0933- Pt Now on RA, and has had some water, tolerating well. RN updated patients  at this time. All questions answered.  Criteria met to transition patient to phase 2 recovery.    1045- Rn confirmed with [facility] care that all DME equipment will be ready for  today at [facility].     1100- Discharge paperwork reviewed and signed with patient and  at bedside.     1105- Patient dressed with assistance of .    1110- PIV removed with tip intact. Patient discharged home with all belongings.

## 2024-01-31 ENCOUNTER — TELEPHONE (OUTPATIENT)
Dept: GASTROENTEROLOGY | Facility: MEDICAL CENTER | Age: 69
End: 2024-01-31
Payer: MEDICARE

## 2024-01-31 NOTE — TELEPHONE ENCOUNTER
Called to check on pt after her PEG tube placement yesterday. Spoke to pt and  on speaker graciela. Pt said she is doing well, just sore and tired. Prasad confirmed he was able to get education and written material regarding tube feeding pump and TF process from RN at [facility]. He stated he is comfortable helping pt getting set up for nocturnal feedings. He confirmed supplies are arriving at their house today or tomorrow.   Reminded both pt and , pt must sleep semi upright (at least 30 degrees) to prevent aspiration. Both verbalized understanding.     Pt scheduled for clinic follow up on 2/7 for PEG site check.

## 2024-02-01 ENCOUNTER — HOSPITAL ENCOUNTER (OUTPATIENT)
Facility: MEDICAL CENTER | Age: 69
End: 2024-02-02
Attending: STUDENT IN AN ORGANIZED HEALTH CARE EDUCATION/TRAINING PROGRAM | Admitting: STUDENT IN AN ORGANIZED HEALTH CARE EDUCATION/TRAINING PROGRAM
Payer: MEDICARE

## 2024-02-01 ENCOUNTER — DOCUMENTATION (OUTPATIENT)
Dept: HOSPITALIST | Facility: MEDICAL CENTER | Age: 69
End: 2024-02-01
Payer: MEDICARE

## 2024-02-01 ENCOUNTER — HOSPITAL ENCOUNTER (OUTPATIENT)
Dept: RADIOLOGY | Facility: MEDICAL CENTER | Age: 69
End: 2024-02-01
Payer: MEDICARE

## 2024-02-01 PROBLEM — K94.13 MALFUNCTIONING JEJUNOSTOMY TUBE (HCC): Status: ACTIVE | Noted: 2024-02-01

## 2024-02-01 PROBLEM — K94.13 MALFUNCTION OF JEJUNOSTOMY TUBE (HCC): Status: ACTIVE | Noted: 2024-02-01

## 2024-02-01 PROCEDURE — 700105 HCHG RX REV CODE 258: Performed by: INTERNAL MEDICINE

## 2024-02-01 PROCEDURE — 99223 1ST HOSP IP/OBS HIGH 75: CPT | Performed by: INTERNAL MEDICINE

## 2024-02-01 PROCEDURE — G0378 HOSPITAL OBSERVATION PER HR: HCPCS

## 2024-02-01 PROCEDURE — 700111 HCHG RX REV CODE 636 W/ 250 OVERRIDE (IP): Mod: JZ | Performed by: INTERNAL MEDICINE

## 2024-02-01 RX ORDER — ONDANSETRON 2 MG/ML
4 INJECTION INTRAMUSCULAR; INTRAVENOUS EVERY 4 HOURS PRN
Status: DISCONTINUED | OUTPATIENT
Start: 2024-02-01 | End: 2024-02-02 | Stop reason: HOSPADM

## 2024-02-01 RX ORDER — MORPHINE SULFATE 4 MG/ML
2 INJECTION INTRAVENOUS
Status: DISCONTINUED | OUTPATIENT
Start: 2024-02-01 | End: 2024-02-02 | Stop reason: HOSPADM

## 2024-02-01 RX ORDER — ONDANSETRON 4 MG/1
4 TABLET, ORALLY DISINTEGRATING ORAL EVERY 4 HOURS PRN
Status: DISCONTINUED | OUTPATIENT
Start: 2024-02-01 | End: 2024-02-02 | Stop reason: HOSPADM

## 2024-02-01 RX ORDER — LEVOTHYROXINE SODIUM 0.05 MG/1
50 TABLET ORAL
Status: DISCONTINUED | OUTPATIENT
Start: 2024-02-02 | End: 2024-02-02 | Stop reason: HOSPADM

## 2024-02-01 RX ORDER — OXYCODONE HYDROCHLORIDE 5 MG/1
2.5 TABLET ORAL
Status: DISCONTINUED | OUTPATIENT
Start: 2024-02-01 | End: 2024-02-02 | Stop reason: HOSPADM

## 2024-02-01 RX ORDER — DEXTROSE AND SODIUM CHLORIDE 5; .45 G/100ML; G/100ML
INJECTION, SOLUTION INTRAVENOUS CONTINUOUS
Status: DISCONTINUED | OUTPATIENT
Start: 2024-02-01 | End: 2024-02-02 | Stop reason: HOSPADM

## 2024-02-01 RX ORDER — ENOXAPARIN SODIUM 100 MG/ML
40 INJECTION SUBCUTANEOUS DAILY
Status: DISCONTINUED | OUTPATIENT
Start: 2024-02-01 | End: 2024-02-02 | Stop reason: HOSPADM

## 2024-02-01 RX ORDER — OXYCODONE HYDROCHLORIDE 5 MG/1
5 TABLET ORAL
Status: DISCONTINUED | OUTPATIENT
Start: 2024-02-01 | End: 2024-02-02 | Stop reason: HOSPADM

## 2024-02-01 RX ADMIN — DEXTROSE AND SODIUM CHLORIDE: 5; 450 INJECTION, SOLUTION INTRAVENOUS at 17:00

## 2024-02-01 ASSESSMENT — PAIN DESCRIPTION - PAIN TYPE
TYPE: ACUTE PAIN
TYPE: ACUTE PAIN;SURGICAL PAIN

## 2024-02-01 ASSESSMENT — LIFESTYLE VARIABLES
TOTAL SCORE: 0
HAVE PEOPLE ANNOYED YOU BY CRITICIZING YOUR DRINKING: NO
HAVE YOU EVER FELT YOU SHOULD CUT DOWN ON YOUR DRINKING: NO
EVER HAD A DRINK FIRST THING IN THE MORNING TO STEADY YOUR NERVES TO GET RID OF A HANGOVER: NO
HOW MANY TIMES IN THE PAST YEAR HAVE YOU HAD 5 OR MORE DRINKS IN A DAY: 0
EVER FELT BAD OR GUILTY ABOUT YOUR DRINKING: NO
ALCOHOL_USE: NO
ON A TYPICAL DAY WHEN YOU DRINK ALCOHOL HOW MANY DRINKS DO YOU HAVE: 0
TOTAL SCORE: 0
CONSUMPTION TOTAL: NEGATIVE
DOES PATIENT WANT TO STOP DRINKING: NO
AVERAGE NUMBER OF DAYS PER WEEK YOU HAVE A DRINK CONTAINING ALCOHOL: 0
TOTAL SCORE: 0

## 2024-02-01 ASSESSMENT — FIBROSIS 4 INDEX: FIB4 SCORE: 2.78

## 2024-02-01 ASSESSMENT — PATIENT HEALTH QUESTIONNAIRE - PHQ9
SUM OF ALL RESPONSES TO PHQ9 QUESTIONS 1 AND 2: 0
2. FEELING DOWN, DEPRESSED, IRRITABLE, OR HOPELESS: NOT AT ALL
1. LITTLE INTEREST OR PLEASURE IN DOING THINGS: NOT AT ALL

## 2024-02-01 NOTE — PROGRESS NOTES
Patient arrived to unit with . Patient AxOx4, VSS, even and unlabored respirations on RA, reports a 3/10 pain in the abdomen. Tenderness when palpating the RLQ and RUQ. Call light within reach, bed locked and in lowest position

## 2024-02-01 NOTE — H&P
Hospital Medicine History & Physical Note    Date of Service  2/1/2024    Primary Care Physician  Zoey Thurman P.A.-C.    Consultants  GI    Specialist Names: Dr Garces    Code Status  Full Code    Chief Complaint  J tube malfunction    History of Presenting Illness  Kathy Dumont is a 68 y.o. female with a past medical history of idiopathic gastroparesis, chronic pancreatitis, anorexia nervosa, possible celiac disease, osteoporosis, IgA deficiency, hypothyroidism, [facility] gastric bypass who presented 2/1/2024 with malfunction of J-tube.  Patient underwent GJ tube placement on 1/30/2024.  Today when patient and  attempted to push feed through the tube it was met with resistance.  They were unable to push any fluids through the tube.  They presented to an outside ER where the tube was noted to be malfunctioning.  A KUB revealed a kink in the tube and patient was transferred to [facility] for further evaluation and GI consultation.  At this time the patient denies any fevers, chills, nausea, worsening abdominal pain or diarrhea.  Her last meal was at 7 AM this morning.  She is still able to tolerate small amounts of feed orally.  I have discussed the case with GI  who will evaluate the patient and is planning to do revision of the tube tomorrow morning.  Patient will be kept n.p.o. at midnight.  At this time we will start her on IV fluid hydration with D5 half NS and she will be kept n.p.o. at midnight.    I discussed the plan of care with patient, family, bedside RN, and GI .    Review of Systems  Review of Systems   All other systems reviewed and are negative.      Past Medical History   has a past medical history of Acute recurrent pancreatitis (08/2021), Anesthesia, Arthritis (08/2021), Celiac disease, Disorder of thyroid, Gastroparesis (08/2021), Gluten intolerance, and Pain (stomach issues).    Surgical History   has a past surgical history that includes appendectomy child;  "cholecystectomy (1963); knee arthroscopy (Left, 1973); primary c section (1984, 1987, 1990); orif, humerus (Left, 2006); pr upper gi endoscopy,diagnosis (N/A, 08/09/2021); pr ercp,diagnostic (N/A, 08/09/2021); egd w/endoscopic ultrasound (N/A, 08/09/2021); pr upper gi endoscopy,diagnosis (04/18/2022); pr ercp,diagnostic (04/18/2022); gastrojejunostomy (N/A, 05/05/2023); other (5-21-23); pr upper gi endoscopy,diagnosis (N/A, 1/30/2024); and gastrostomy (N/A, 1/30/2024).     Family History  family history is not on file.   Family history reviewed with patient. There is no family history that is pertinent to the chief complaint.     Social History   reports that she has never smoked. She has never been exposed to tobacco smoke. She has never used smokeless tobacco. She reports that she does not currently use drugs after having used the following drugs: Oral. She reports that she does not drink alcohol.    Allergies  Allergies   Allergen Reactions    Hydrocodone-Acetaminophen Rash     Rash, \"I can tolerate in small amounts\"      Loratadine-Pseudoephedrine Rash     rash    Sertraline Hcl Palpitations    Tramadol Vomiting     Fainting    Zoloft Rash     rash    Codeine Vomiting    Gluten Meal      \"violent stomach upset for 4-5 days\"       Medications  Prior to Admission Medications   Prescriptions Last Dose Informant Patient Reported? Taking?   CALCIUM PO  Patient Yes No   Sig: Take 1 Tablet by mouth every day.   Pancrelipase, Lip-Prot-Amyl, (CREON) 09172-141031 units Cap DR Particles  Patient No No   Sig: Take 2 Capsules by mouth 3 times a day. Take 2 capsules per each meal, and 1 capsule per snack   Pancrelipase, Lip-Prot-Amyl, [id number] units Cap DR Particles  Patient No No   Sig: Take 2 Capsules by mouth 3 times a day with meals.   levothyroxine (SYNTHROID) 50 MCG Tab  Patient Yes No   Sig: Take 50 mcg by mouth every morning on an empty stomach.   multivitamin Tab  Patient Yes No   Sig: Take 1 Tablet by mouth " "every day.   ondansetron (ZOFRAN) 4 MG Tab tablet  Patient Yes No   Sig: Take 4 mg by mouth every 8 hours as needed for Nausea/Vomiting.   oxyCODONE CR (OXYCONTIN) 40 MG Tablet Extended Release 12 hour Abuse-Deterrent tablet  Patient Yes No   Sig: Take 40 mg by mouth every 12 hours as needed.      Facility-Administered Medications: None       Physical Exam  Temp:  [36.3 °C (97.4 °F)] 36.3 °C (97.4 °F)  Pulse:  [78] 78  Resp:  [18] 18  BP: (104)/(51) 104/51  SpO2:  [98 %] 98 %  Blood Pressure : 104/51   Temperature: 36.3 °C (97.4 °F)   Pulse: 78   Respiration: 18   Pulse Oximetry: 98 %       Physical Exam  Vitals and nursing note reviewed.   Constitutional:       General: She is not in acute distress.     Comments: Underweight   HENT:      Head: Normocephalic.      Mouth/Throat:      Mouth: Mucous membranes are moist.   Eyes:      Pupils: Pupils are equal, round, and reactive to light.   Cardiovascular:      Rate and Rhythm: Normal rate and regular rhythm.      Pulses: Normal pulses.      Heart sounds: Normal heart sounds.   Pulmonary:      Effort: Pulmonary effort is normal.      Breath sounds: Normal breath sounds.   Abdominal:      Palpations: Abdomen is soft.      Tenderness: There is no abdominal tenderness.      Comments: J-tube in place    Musculoskeletal:         General: No swelling.      Cervical back: Neck supple.   Skin:     General: Skin is warm.      Coloration: Skin is not jaundiced.   Neurological:      General: No focal deficit present.      Mental Status: She is alert and oriented to person, place, and time.   Psychiatric:         Mood and Affect: Mood normal.         Behavior: Behavior normal.         Laboratory:          No results for input(s): \"ALTSGPT\", \"ASTSGOT\", \"ALKPHOSPHAT\", \"TBILIRUBIN\", \"DBILIRUBIN\", \"GAMMAGT\", \"AMYLASE\", \"LIPASE\", \"ALB\", \"PREALBUMIN\", \"GLUCOSE\" in the last 72 hours.      No results for input(s): \"NTPROBNP\" in the last 72 hours.      No results for input(s): \"TROPONINT\" " in the last 72 hours.    Imaging:  No orders to display         Assessment/Plan:  Justification for Admission Status  I anticipate this patient is appropriate for observation status at this time because J tube malfunction    Patient will need a Med/Surg bed on MEDICAL service .  The need is secondary to .    Malfunction of jejunostomy tube (HCC)- (present on admission)  Assessment & Plan  GI has been consulted, plan for revision tomorrow morning  N.p.o. at midnight  IV fluid hydration with D5 half NS    Underweight due to inadequate caloric intake- (present on admission)  Assessment & Plan  Patient encouraged oral intake    Hypothyroidism- (present on admission)  Assessment & Plan  Continue Synthroid    Anorexia- (present on admission)  Assessment & Plan  Patient encouraged to increase oral intake    Pancreatitis, chronic (HCC)- (present on admission)  Assessment & Plan  Continue home regimen of oxycodone and Creon    Gastroparesis- (present on admission)  Assessment & Plan  Plan for revision of J-tube placement        VTE prophylaxis: SCDs/TEDs    Total time spent on admission 77 minutes.    This included my review with ER physician, review of ED events, patient's history, outside records, recent records, face to face interview, physical examination; my review of lab results (CBC, chemistry panel), imaging review (Xray). Also includes counseling patient on admission, treatment plan, and documentation of encounter.  In addition, speaking with specialist GI team.

## 2024-02-01 NOTE — ASSESSMENT & PLAN NOTE
GI has been consulted, plan for revision tomorrow morning  N.p.o. at midnight  IV fluid hydration with D5 half NS

## 2024-02-01 NOTE — PROGRESS NOTES
[facility] DIRECT ADMISSION REPORT    Transferring facility: [facility]    Transferring physician: Dr Salvador Calvert    Chief complaint: J-tube malfunction    Pertinent history & patient course: 66 jejunostomy for gastroparesis and pancreatitis, severe protein-calorie malnutrition. Was initiated today with blockage of ports. Suction port is functional    Pertinent imaging & lab results: KUB in progress to confirm post-pyloric placement    Consultants called prior to transfer and pertinent input from consultants: Gastroenterology Dr. Garces, agreed with transfer    Code Status: FULL per transferring provider, I personally verified with the transferring provider patient's code status and the transferring provider has confirmed this with the patient.    Reason for Transfer: J-tube malfunction requiring replacement    Further work up or recommendations requested prior to transfer: No    Patient accepted for transfer: Yes    Accepting [facility] Facility: [facility] to notify the Triage Coordinator in the [facility] via Voalte or Phone ext. [phone] when patient arrives to the unit. The Triage Coordinator will assign the admitting provider.    Consultants to be called upon arrival: Gastroenterology    Admission status: Observation.     Floor requested: Med/Surg    If ICU transfer, name of intensivist case discussed with and pertinent input from critical care: N/A    The admitting provider is the point of contact for questions or concerns regarding patient's care.    [unfilled]     This encounter was for documentation purposes of the incoming transfer request. A copy of this transfer reports has been placed in the pending encounter.

## 2024-02-01 NOTE — PROGRESS NOTES
[facility] DIRECT ADMISSION REPORT    Transferring facility: [facility]    Transferring physician: Dr Salvador Calvert    Chief complaint: J-tube malfunction    Pertinent history & patient course: 66 jejunostomy for gastroparesis and pancreatitis, severe protein-calorie malnutrition. Was initiated today with blockage of ports. Suction port is functional    Pertinent imaging & lab results: KUB in progress to confirm post-pyloric placement    Consultants called prior to transfer and pertinent input from consultants: Gastroenterology Dr. Garces, agreed with transfer    Code Status: FULL per transferring provider, I personally verified with the transferring provider patient's code status and the transferring provider has confirmed this with the patient.    Reason for Transfer: J-tube malfunction requiring replacement    Further work up or recommendations requested prior to transfer: No    Patient accepted for transfer: Yes    Accepting [facility] Facility: [facility] - Nursing to notify the Triage Coordinator in the [facility] via Voalte or Phone ext. [phone] when patient arrives to the unit. The Triage Coordinator will assign the admitting provider.    Consultants to be called upon arrival: Gastroenterology    Admission status: Observation.     Floor requested: Med/Surg    If ICU transfer, name of intensivist case discussed with and pertinent input from critical care: N/A    The admitting provider is the point of contact for questions or concerns regarding patient's care.        This encounter was for documentation purposes of the incoming transfer request. A copy of this transfer reports has been placed in the pending encounter.

## 2024-02-01 NOTE — PROGRESS NOTES
Patient known to the gastroenterology service.  Recent GJ tube placement.  J-tube nonfunctional.  Anticipate J-tube revision tomorrow.  Orders posted.  Please keep n.p.o. after midnight.

## 2024-02-02 ENCOUNTER — ANESTHESIA EVENT (OUTPATIENT)
Dept: SURGERY | Facility: MEDICAL CENTER | Age: 69
End: 2024-02-02
Payer: MEDICARE

## 2024-02-02 ENCOUNTER — DOCUMENTATION (OUTPATIENT)
Dept: INTERNAL MEDICINE | Facility: OTHER | Age: 69
End: 2024-02-02
Payer: MEDICARE

## 2024-02-02 ENCOUNTER — ANESTHESIA (OUTPATIENT)
Dept: SURGERY | Facility: MEDICAL CENTER | Age: 69
End: 2024-02-02
Payer: MEDICARE

## 2024-02-02 VITALS
BODY MASS INDEX: 11.25 KG/M2 | TEMPERATURE: 97 F | HEIGHT: 63 IN | HEART RATE: 67 BPM | SYSTOLIC BLOOD PRESSURE: 107 MMHG | WEIGHT: 63.49 LBS | RESPIRATION RATE: 16 BRPM | DIASTOLIC BLOOD PRESSURE: 61 MMHG | OXYGEN SATURATION: 97 %

## 2024-02-02 PROBLEM — K94.13 MALFUNCTION OF JEJUNOSTOMY TUBE (HCC): Status: RESOLVED | Noted: 2024-02-01 | Resolved: 2024-02-02

## 2024-02-02 PROBLEM — K94.13 MALFUNCTIONING JEJUNOSTOMY TUBE (HCC): Status: RESOLVED | Noted: 2024-02-01 | Resolved: 2024-02-02

## 2024-02-02 LAB
ANION GAP SERPL CALC-SCNC: 7 MMOL/L (ref 7–16)
BUN SERPL-MCNC: 13 MG/DL (ref 8–22)
CALCIUM SERPL-MCNC: 8.6 MG/DL (ref 8.5–10.5)
CHLORIDE SERPL-SCNC: 104 MMOL/L (ref 96–112)
CO2 SERPL-SCNC: 24 MMOL/L (ref 20–33)
CREAT SERPL-MCNC: 0.5 MG/DL (ref 0.5–1.4)
ERYTHROCYTE [DISTWIDTH] IN BLOOD BY AUTOMATED COUNT: 44.1 FL (ref 35.9–50)
GFR SERPLBLD CREATININE-BSD FMLA CKD-EPI: 102 ML/MIN/1.73 M 2
GLUCOSE SERPL-MCNC: 111 MG/DL (ref 65–99)
HCT VFR BLD AUTO: 32.3 % (ref 37–47)
HGB BLD-MCNC: 11 G/DL (ref 12–16)
MCH RBC QN AUTO: 33.3 PG (ref 27–33)
MCHC RBC AUTO-ENTMCNC: 34.1 G/DL (ref 32.2–35.5)
MCV RBC AUTO: 97.9 FL (ref 81.4–97.8)
PLATELET # BLD AUTO: 188 K/UL (ref 164–446)
PMV BLD AUTO: 9.7 FL (ref 9–12.9)
POTASSIUM SERPL-SCNC: 3.7 MMOL/L (ref 3.6–5.5)
RBC # BLD AUTO: 3.3 M/UL (ref 4.2–5.4)
SODIUM SERPL-SCNC: 135 MMOL/L (ref 135–145)
WBC # BLD AUTO: 3.9 K/UL (ref 4.8–10.8)

## 2024-02-02 PROCEDURE — 700105 HCHG RX REV CODE 258: Performed by: ANESTHESIOLOGY

## 2024-02-02 PROCEDURE — 160009 HCHG ANES TIME/MIN: Performed by: INTERNAL MEDICINE

## 2024-02-02 PROCEDURE — 700111 HCHG RX REV CODE 636 W/ 250 OVERRIDE (IP): Performed by: ANESTHESIOLOGY

## 2024-02-02 PROCEDURE — 700101 HCHG RX REV CODE 250: Performed by: ANESTHESIOLOGY

## 2024-02-02 PROCEDURE — G0378 HOSPITAL OBSERVATION PER HR: HCPCS

## 2024-02-02 PROCEDURE — 160035 HCHG PACU - 1ST 60 MINS PHASE I: Performed by: INTERNAL MEDICINE

## 2024-02-02 PROCEDURE — 160048 HCHG OR STATISTICAL LEVEL 1-5: Performed by: INTERNAL MEDICINE

## 2024-02-02 PROCEDURE — 99214 OFFICE O/P EST MOD 30 MIN: CPT | Mod: 25 | Performed by: INTERNAL MEDICINE

## 2024-02-02 PROCEDURE — 700102 HCHG RX REV CODE 250 W/ 637 OVERRIDE(OP): Performed by: INTERNAL MEDICINE

## 2024-02-02 PROCEDURE — 80048 BASIC METABOLIC PNL TOTAL CA: CPT

## 2024-02-02 PROCEDURE — 44360 SMALL BOWEL ENDOSCOPY: CPT | Performed by: INTERNAL MEDICINE

## 2024-02-02 PROCEDURE — 700105 HCHG RX REV CODE 258: Performed by: INTERNAL MEDICINE

## 2024-02-02 PROCEDURE — 99239 HOSP IP/OBS DSCHRG MGMT >30: CPT | Mod: GC | Performed by: INTERNAL MEDICINE

## 2024-02-02 PROCEDURE — 160002 HCHG RECOVERY MINUTES (STAT): Performed by: INTERNAL MEDICINE

## 2024-02-02 PROCEDURE — 160202 HCHG ENDO MINUTES - 1ST 30 MINS LEVEL 3: Performed by: INTERNAL MEDICINE

## 2024-02-02 PROCEDURE — 85027 COMPLETE CBC AUTOMATED: CPT

## 2024-02-02 PROCEDURE — A9270 NON-COVERED ITEM OR SERVICE: HCPCS | Performed by: INTERNAL MEDICINE

## 2024-02-02 PROCEDURE — 160207 HCHG ENDO MINUTES - EA ADDL 1 MIN LEVEL 3: Performed by: INTERNAL MEDICINE

## 2024-02-02 DEVICE — IMPLANTABLE DEVICE: Type: IMPLANTABLE DEVICE | Site: ESOPHAGUS | Status: FUNCTIONAL

## 2024-02-02 RX ORDER — DEXAMETHASONE SODIUM PHOSPHATE 4 MG/ML
INJECTION, SOLUTION INTRA-ARTICULAR; INTRALESIONAL; INTRAMUSCULAR; INTRAVENOUS; SOFT TISSUE PRN
Status: DISCONTINUED | OUTPATIENT
Start: 2024-02-02 | End: 2024-02-02 | Stop reason: SURG

## 2024-02-02 RX ORDER — HALOPERIDOL 5 MG/ML
1 INJECTION INTRAMUSCULAR
Status: DISCONTINUED | OUTPATIENT
Start: 2024-02-02 | End: 2024-02-02 | Stop reason: HOSPADM

## 2024-02-02 RX ORDER — ONDANSETRON 2 MG/ML
4 INJECTION INTRAMUSCULAR; INTRAVENOUS
Status: DISCONTINUED | OUTPATIENT
Start: 2024-02-02 | End: 2024-02-02 | Stop reason: HOSPADM

## 2024-02-02 RX ORDER — SODIUM CHLORIDE, SODIUM LACTATE, POTASSIUM CHLORIDE, CALCIUM CHLORIDE 600; 310; 30; 20 MG/100ML; MG/100ML; MG/100ML; MG/100ML
INJECTION, SOLUTION INTRAVENOUS CONTINUOUS
Status: DISCONTINUED | OUTPATIENT
Start: 2024-02-02 | End: 2024-02-02 | Stop reason: HOSPADM

## 2024-02-02 RX ORDER — OXYCODONE HCL 5 MG/5 ML
10 SOLUTION, ORAL ORAL
Status: DISCONTINUED | OUTPATIENT
Start: 2024-02-02 | End: 2024-02-02 | Stop reason: HOSPADM

## 2024-02-02 RX ORDER — HYDROMORPHONE HYDROCHLORIDE 1 MG/ML
0.2 INJECTION, SOLUTION INTRAMUSCULAR; INTRAVENOUS; SUBCUTANEOUS
Status: DISCONTINUED | OUTPATIENT
Start: 2024-02-02 | End: 2024-02-02 | Stop reason: HOSPADM

## 2024-02-02 RX ORDER — ROCURONIUM BROMIDE 10 MG/ML
INJECTION, SOLUTION INTRAVENOUS PRN
Status: DISCONTINUED | OUTPATIENT
Start: 2024-02-02 | End: 2024-02-02 | Stop reason: SURG

## 2024-02-02 RX ORDER — HYDROMORPHONE HYDROCHLORIDE 1 MG/ML
0.1 INJECTION, SOLUTION INTRAMUSCULAR; INTRAVENOUS; SUBCUTANEOUS
Status: DISCONTINUED | OUTPATIENT
Start: 2024-02-02 | End: 2024-02-02 | Stop reason: HOSPADM

## 2024-02-02 RX ORDER — MEPERIDINE HYDROCHLORIDE 25 MG/ML
12.5 INJECTION INTRAMUSCULAR; INTRAVENOUS; SUBCUTANEOUS
Status: DISCONTINUED | OUTPATIENT
Start: 2024-02-02 | End: 2024-02-02 | Stop reason: HOSPADM

## 2024-02-02 RX ORDER — EPHEDRINE SULFATE 50 MG/ML
5 INJECTION, SOLUTION INTRAVENOUS
Status: DISCONTINUED | OUTPATIENT
Start: 2024-02-02 | End: 2024-02-02 | Stop reason: HOSPADM

## 2024-02-02 RX ORDER — SODIUM CHLORIDE, SODIUM LACTATE, POTASSIUM CHLORIDE, CALCIUM CHLORIDE 600; 310; 30; 20 MG/100ML; MG/100ML; MG/100ML; MG/100ML
INJECTION, SOLUTION INTRAVENOUS
Status: DISCONTINUED | OUTPATIENT
Start: 2024-02-02 | End: 2024-02-02 | Stop reason: SURG

## 2024-02-02 RX ORDER — MIDAZOLAM HYDROCHLORIDE 1 MG/ML
1 INJECTION INTRAMUSCULAR; INTRAVENOUS
Status: DISCONTINUED | OUTPATIENT
Start: 2024-02-02 | End: 2024-02-02 | Stop reason: HOSPADM

## 2024-02-02 RX ORDER — HYDROMORPHONE HYDROCHLORIDE 1 MG/ML
0.4 INJECTION, SOLUTION INTRAMUSCULAR; INTRAVENOUS; SUBCUTANEOUS
Status: DISCONTINUED | OUTPATIENT
Start: 2024-02-02 | End: 2024-02-02 | Stop reason: HOSPADM

## 2024-02-02 RX ORDER — ACETAMINOPHEN 325 MG/1
650 TABLET ORAL EVERY 6 HOURS PRN
Status: DISCONTINUED | OUTPATIENT
Start: 2024-02-02 | End: 2024-02-02 | Stop reason: HOSPADM

## 2024-02-02 RX ORDER — HYDRALAZINE HYDROCHLORIDE 20 MG/ML
5 INJECTION INTRAMUSCULAR; INTRAVENOUS
Status: DISCONTINUED | OUTPATIENT
Start: 2024-02-02 | End: 2024-02-02 | Stop reason: HOSPADM

## 2024-02-02 RX ORDER — DIPHENHYDRAMINE HYDROCHLORIDE 50 MG/ML
12.5 INJECTION INTRAMUSCULAR; INTRAVENOUS
Status: DISCONTINUED | OUTPATIENT
Start: 2024-02-02 | End: 2024-02-02 | Stop reason: HOSPADM

## 2024-02-02 RX ORDER — IPRATROPIUM BROMIDE AND ALBUTEROL SULFATE 2.5; .5 MG/3ML; MG/3ML
3 SOLUTION RESPIRATORY (INHALATION)
Status: DISCONTINUED | OUTPATIENT
Start: 2024-02-02 | End: 2024-02-02 | Stop reason: HOSPADM

## 2024-02-02 RX ORDER — ONDANSETRON 2 MG/ML
INJECTION INTRAMUSCULAR; INTRAVENOUS PRN
Status: DISCONTINUED | OUTPATIENT
Start: 2024-02-02 | End: 2024-02-02 | Stop reason: SURG

## 2024-02-02 RX ORDER — LABETALOL HYDROCHLORIDE 5 MG/ML
5 INJECTION, SOLUTION INTRAVENOUS
Status: DISCONTINUED | OUTPATIENT
Start: 2024-02-02 | End: 2024-02-02 | Stop reason: HOSPADM

## 2024-02-02 RX ORDER — LIDOCAINE HYDROCHLORIDE 20 MG/ML
INJECTION, SOLUTION EPIDURAL; INFILTRATION; INTRACAUDAL; PERINEURAL PRN
Status: DISCONTINUED | OUTPATIENT
Start: 2024-02-02 | End: 2024-02-02 | Stop reason: SURG

## 2024-02-02 RX ORDER — OXYCODONE HCL 5 MG/5 ML
5 SOLUTION, ORAL ORAL
Status: DISCONTINUED | OUTPATIENT
Start: 2024-02-02 | End: 2024-02-02 | Stop reason: HOSPADM

## 2024-02-02 RX ADMIN — LEVOTHYROXINE SODIUM 50 MCG: 0.05 TABLET ORAL at 05:04

## 2024-02-02 RX ADMIN — ONDANSETRON 2 MG: 2 INJECTION INTRAMUSCULAR; INTRAVENOUS at 16:00

## 2024-02-02 RX ADMIN — SUGAMMADEX 100 MG: 100 INJECTION, SOLUTION INTRAVENOUS at 16:04

## 2024-02-02 RX ADMIN — ROCURONIUM BROMIDE 15 MG: 50 INJECTION, SOLUTION INTRAVENOUS at 15:22

## 2024-02-02 RX ADMIN — SODIUM CHLORIDE, POTASSIUM CHLORIDE, SODIUM LACTATE AND CALCIUM CHLORIDE: 600; 310; 30; 20 INJECTION, SOLUTION INTRAVENOUS at 15:20

## 2024-02-02 RX ADMIN — LIDOCAINE HYDROCHLORIDE 30 MG: 20 INJECTION, SOLUTION EPIDURAL; INFILTRATION; INTRACAUDAL at 15:22

## 2024-02-02 RX ADMIN — PROPOFOL 50 MG: 10 INJECTION, EMULSION INTRAVENOUS at 15:22

## 2024-02-02 RX ADMIN — DEXAMETHASONE SODIUM PHOSPHATE 4 MG: 4 INJECTION INTRA-ARTICULAR; INTRALESIONAL; INTRAMUSCULAR; INTRAVENOUS; SOFT TISSUE at 15:39

## 2024-02-02 RX ADMIN — ACETAMINOPHEN 650 MG: 325 TABLET, FILM COATED ORAL at 09:35

## 2024-02-02 RX ADMIN — DEXTROSE AND SODIUM CHLORIDE 1000 ML: 5; 450 INJECTION, SOLUTION INTRAVENOUS at 03:36

## 2024-02-02 ASSESSMENT — PATIENT HEALTH QUESTIONNAIRE - PHQ9
2. FEELING DOWN, DEPRESSED, IRRITABLE, OR HOPELESS: NOT AT ALL
1. LITTLE INTEREST OR PLEASURE IN DOING THINGS: NOT AT ALL
SUM OF ALL RESPONSES TO PHQ9 QUESTIONS 1 AND 2: 0

## 2024-02-02 ASSESSMENT — PAIN DESCRIPTION - PAIN TYPE
TYPE: SURGICAL PAIN
TYPE: ACUTE PAIN
TYPE: SURGICAL PAIN

## 2024-02-02 ASSESSMENT — PAIN SCALES - GENERAL: PAIN_LEVEL: 0

## 2024-02-02 NOTE — CARE PLAN
The patient is Stable - Low risk of patient condition declining or worsening    Shift Goals  Clinical Goals: IV fluids, rest, npo at midnight  Patient Goals: Rest. eat  Family Goals: Updates    Progress made toward(s) clinical / shift goals:    Problem: Knowledge Deficit - Standard  Goal: Patient and family/care givers will demonstrate understanding of plan of care, disease process/condition, diagnostic tests and medications  Outcome: Progressing     Problem: Pain - Standard  Goal: Alleviation of pain or a reduction in pain to the patient’s comfort goal  Outcome: Progressing       Patient is not progressing towards the following goals:

## 2024-02-02 NOTE — ANESTHESIA PREPROCEDURE EVALUATION
Case: [id number] Date/Time: 02/02/24 1221    Procedure: GASTROSCOPY WITH J TUBE REVISION    Location: [facility] ROOM 26 / SURGERY SAME DAY [facility]    Surgeons: Davin Garces M.D.            Relevant Problems   ANESTHESIA   (positive) PONV (postoperative nausea and vomiting)      ENDO   (positive) Hypothyroidism       Physical Exam    Airway   Mallampati: II  TM distance: >3 FB  Neck ROM: full       Cardiovascular - normal exam  Rhythm: regular  Rate: normal  (-) murmur     Dental - normal exam           Pulmonary - normal exam  Breath sounds clear to auscultation     Abdominal    Neurological - normal exam                   Anesthesia Plan    ASA 2       Plan - general       Airway plan will be ETT          Induction: intravenous    Postoperative Plan: Postoperative administration of opioids is intended.    Pertinent diagnostic labs and testing reviewed    Informed Consent:    Anesthetic plan and risks discussed with patient.    Use of blood products discussed with: patient whom consented to blood products.

## 2024-02-02 NOTE — ANESTHESIA PROCEDURE NOTES
Airway    Date/Time: 2/2/2024 3:23 PM    Performed by: Eduardo Rodgers M.D.  Authorized by: Eduardo Rodgers M.D.    Location:  OR  Urgency:  Elective  Difficult Airway: No    Indications for Airway Management:  Anesthesia      Spontaneous Ventilation: absent    Sedation Level:  Deep  Preoxygenated: Yes    Patient Position:  Sniffing  Mask Difficulty Assessment:  1 - vent by mask  Final Airway Type:  Endotracheal airway  Final Endotracheal Airway:  ETT  Cuffed: Yes    Technique Used for Successful ETT Placement:  Direct laryngoscopy  Devices/Methods Used in Placement:  Cricoid pressure    Insertion Site:  Oral  Blade Type:  Thompson  Laryngoscope Blade/Videolaryngoscope Blade Size:  2  ETT Size (mm):  7.0  Measured from:  Teeth  ETT to Teeth (cm):  22  Placement Verified by: auscultation and capnometry    Cormack-Lehane Classification:  Grade I - full view of glottis  Number of Attempts at Approach:  1

## 2024-02-02 NOTE — ANESTHESIA PROCEDURE NOTES
Peripheral IV    Date/Time: 2/2/2024 3:25 PM    Performed by: Eduardo Rodgers M.D.  Authorized by: Eduardo Rodgers M.D.    Size:  20 G  Laterality:  Left  Peripheral IV Location:  Upper arm  Local Anesthetic:  None  Site Prep:  Alcohol  Technique:  Direct puncture  Attempts:  1  Difficult IV necessitating physician skill: IV access difficult    Ultrasound Guidance: No

## 2024-02-02 NOTE — PROGRESS NOTES
Report received. Assumed care. Pt AAOx4. Focused Assessment complete. VSS. Pt c/o pain to, medicate per MAR; No other complaints reported; Pt was update on POC. White board updated, All question answered. Pt has call light within reach, bed is in the lowest position. Pt has no other needs at this time.

## 2024-02-02 NOTE — PROGRESS NOTES
This RN received report from YOUSUF Smith. Assumed care of patient. Patient A&Ox 4, with normal WOB on RA. No c/o pain at this time. Family at bedside. Patient updated on plan of care, no further needs at this time. Bed in low and locked position, call light within reach.

## 2024-02-02 NOTE — ANESTHESIA PREPROCEDURE EVALUATION
Case: [id number] Date/Time: 02/02/24 1221    Procedures:       GASTROSCOPY WITH J TUBE REVISION (Esophagus)      GASTROSCOPY (Esophagus)    Anesthesia type: General    Pre-op diagnosis: Malfunctioning jejunostomy tube    Location: [facility] ROOM 26 / SURGERY SAME DAY [facility]    Surgeons: Davin Garces M.D.            Relevant Problems   ANESTHESIA   (positive) PONV (postoperative nausea and vomiting)      ENDO   (positive) Hypothyroidism      Other   (positive) Anorexia   (positive) Complications of gastric bypass surgery   (positive) Eating disorder   (positive) Gastric outlet obstruction   (positive) Gastroparesis   (positive) History of Sinai-en-Y gastric bypass   (positive) Malfunction of jejunostomy tube (HCC)   (positive) Pancreatitis, chronic (HCC)   (positive) Partial gastric outlet obstruction   (positive) Severe protein-calorie malnutrition (HCC)       Physical Exam    Airway   Mallampati: II  TM distance: >3 FB  Neck ROM: full       Cardiovascular - normal exam  Rhythm: regular  Rate: normal  (-) murmur     Dental - normal exam           Pulmonary - normal exam  Breath sounds clear to auscultation     Abdominal    Neurological - normal exam                   Anesthesia Plan    ASA 4 (see prob list)   ASA physical status 4 criteria: other (comment)    Plan - general       Airway plan will be ETT          Induction: intravenous    Postoperative Plan: Postoperative administration of opioids is intended.    Pertinent diagnostic labs and testing reviewed    Informed Consent:    Anesthetic plan and risks discussed with patient.    Use of blood products discussed with: patient whom consented to blood products.

## 2024-02-02 NOTE — CONSULTS
Date of Consultation:  2/2/2024    Patient: : Kathy Dumont  MRN: [id number]    Referring Physician:  Dr Martinez     GI:Davin Gacres M.D.     Reason for Consultation: Malfunctioning jejunostomy tube    History of Present Illness:   68-year-old female known to Dr. Oakes.  Patient had GJ tube placed last week as an outpatient.  J-tube nonfunctioning.  Patient endorses incisional abdominal pain near the external bumper.  Otherwise no blood or discharge.      Past Medical History:   Diagnosis Date    Gastroparesis 08/2021    Acute recurrent pancreatitis 08/2021    Arthritis 08/2021    fingers    Anesthesia     PONV    Celiac disease     Disorder of thyroid     hypothyroid    Gluten intolerance     Pain stomach issues       Past Surgical History:   Procedure Laterality Date    [place] UPPER GI ENDOSCOPY,DIAGNOSIS N/A 1/30/2024    Procedure: GASTROSCOPY;  Surgeon: Claudia Oakes M.D.;  Location: SURGERY SAME DAY [facility];  Service: Gastroenterology    GASTROSTOMY N/A 1/30/2024    Procedure: PERCUTANEOUS ENDOSCOPIC GASTROSTOMY WITH TUBE PLACEMENT, JEJUNOSTOMY TUBE;  Surgeon: Claudia Oakes M.D.;  Location: SURGERY SAME DAY [facility];  Service: Gastroenterology    GASTROJEJUNOSTOMY N/A 05/05/2023    Procedure: MAIK-EN-Y GASTROJEJUNOSTOMY BYPASS AND EXPLORATORY LAPAORTOMY;  Surgeon: Bradly Cisneros M.D.;  Location: [facility];  Service: General    TX UPPER GI ENDOSCOPY,DIAGNOSIS  04/18/2022    Procedure: GASTROSCOPY - WITH PYLORUS DILATION WITH BOTOX INJECTION;  Surgeon: Rivas Cadet M.D.;  Location: [facility];  Service: Gastroenterology    TX ERCP,DIAGNOSTIC  04/18/2022    Procedure: ERCP (ENDOSCOPIC RETROGRADE CHOLANGIOPANCREATOGRAPHY);  Surgeon: Rivas Cadet M.D.;  Location: [facility];  Service: Gastroenterology    TX UPPER GI ENDOSCOPY,DIAGNOSIS N/A 08/09/2021    Procedure: GASTROSCOPY;  Surgeon: Rivas Cadet M.D.;  Location:  SURGERY [facility];  Service: EUS    [place] ERCP,DIAGNOSTIC N/A 08/09/2021    Procedure: ERCP, DIAGNOSTIC;  Surgeon: Rivas Cadet M.D.;  Location: [facility];  Service: EUS    EGD W/ENDOSCOPIC ULTRASOUND N/A 08/09/2021    Procedure: EGD, WITH ENDOSCOPIC US - UPPER RADIAL;  Surgeon: Rivas Cadet M.D.;  Location: [facility];  Service: EUS    ORIF, HUMERUS Left 2006    KNEE ARTHROSCOPY Left 1973    CHOLECYSTECTOMY  1963    APPENDECTOMY CHILD      OTHER  5-21-23    PRIMARY C SECTION  1984, 1987, 1990       No family history on file.    Social History     Socioeconomic History    Marital status:    Tobacco Use    Smoking status: Never     Passive exposure: Never    Smokeless tobacco: Never   Vaping Use    Vaping Use: Never used   Substance and Sexual Activity    Alcohol use: Never    Drug use: Not Currently     Types: Oral     Comment: cbd/thc, daily for pain - last took 5/3/2023       Current Meds (name, sig, last dose):     Current Facility-Administered Medications:     Respiratory Therapy Consult    D5 1/2 NS    enoxaparin (LOVENOX) injection    Notify provider if pain remains uncontrolled **AND** Use the Numeric Rating Scale (NRS), [employer]-Baker Faces (WBF), or FLACC on regular floors and Critical-Care Pain Observation Tool (CPOT) on ICUs/Trauma to assess pain **AND** Pulse Ox **AND** Pharmacy Consult Request **AND** If patient difficult to arouse and/or has respiratory depression (respiratory rate of 10 or less), stop any opiates that are currently infusing and call a Rapid Response.    oxyCODONE immediate-release **OR** oxyCODONE immediate-release **OR** morphine injection    ondansetron    ondansetron    levothyroxine    pancrelipase (Lip-Prot-Amyl)    Physical Exam:  Vitals:    02/01/24 1503 02/01/24 1923 02/02/24 0334 02/02/24 0651   BP: 104/51 108/55 101/56 95/54   Pulse: 78 (!) 58 84 (!) 51   Resp: 18 14 18 16   Temp: 36.3 °C (97.4 °F) 36.7 °C (98.1 °F) 36.7 °C (98.1  "°F) 36.3 °C (97.4 °F)   TempSrc: Temporal Temporal Temporal Temporal   SpO2: 98% 98% 94% 99%   Weight: 28.8 kg (63 lb 7.9 oz)      Height: 1.6 m (5' 3\")          Physical Exam  Constitutional:       General: She is not in acute distress.     Comments: Malnourished/underweight   HENT:      Head: Normocephalic and atraumatic.      Mouth/Throat:      Mouth: Mucous membranes are moist.   Eyes:      General: No scleral icterus.  Cardiovascular:      Rate and Rhythm: Normal rate and regular rhythm.   Pulmonary:      Effort: Pulmonary effort is normal.      Breath sounds: Normal breath sounds.   Abdominal:      General: Bowel sounds are normal.      Palpations: Abdomen is soft.      Tenderness: There is abdominal tenderness.      Comments: G-tube clean dry and intact.   Skin:     General: Skin is warm and dry.   Neurological:      General: No focal deficit present.      Mental Status: She is alert.   Psychiatric:         Mood and Affect: Mood normal.         Behavior: Behavior normal.         Judgment: Judgment normal.           Labs:  Recent Labs     02/02/24  0503   SODIUM 135   POTASSIUM 3.7   CHLORIDE 104   CO2 24   BUN 13   CREATININE 0.50   CALCIUM 8.6     Recent Labs     02/02/24  0503   GLUCOSE 111*     Recent Labs     02/02/24  0503   WBC 3.9*     Recent Labs     02/02/24  0503   RBC 3.30*   HEMOGLOBIN 11.0*   HEMATOCRIT 32.3*   PLATELETCT 188     Recent Results (from the past 24 hour(s))   CBC without Differential    Collection Time: 02/02/24  5:03 AM   Result Value Ref Range    WBC 3.9 (L) 4.8 - 10.8 K/uL    RBC 3.30 (L) 4.20 - 5.40 M/uL    Hemoglobin 11.0 (L) 12.0 - 16.0 g/dL    Hematocrit 32.3 (L) 37.0 - 47.0 %    MCV 97.9 (H) 81.4 - 97.8 fL    MCH 33.3 (H) 27.0 - 33.0 pg    MCHC 34.1 32.2 - 35.5 g/dL    RDW 44.1 35.9 - 50.0 fL    Platelet Count 188 164 - 446 K/uL    MPV 9.7 9.0 - 12.9 fL   Basic Metabolic Panel (BMP)    Collection Time: 02/02/24  5:03 AM   Result Value Ref Range    Sodium 135 135 - 145 mmol/L "    Potassium 3.7 3.6 - 5.5 mmol/L    Chloride 104 96 - 112 mmol/L    Co2 24 20 - 33 mmol/L    Glucose 111 (H) 65 - 99 mg/dL    Bun 13 8 - 22 mg/dL    Creatinine 0.50 0.50 - 1.40 mg/dL    Calcium 8.6 8.5 - 10.5 mg/dL    Anion Gap 7.0 7.0 - 16.0   ESTIMATED GFR    Collection Time: 02/02/24  5:03 AM   Result Value Ref Range    GFR (CKD-EPI) 102 >60 mL/min/1.73 m 2         Imaging:  DX-UPPER GI-SMALL BOWEL FOLLOW THRU  Narrative: 9/13/2023 8:22 AM    HISTORY/REASON FOR EXAM:  Delayed gastric emptying with Sinai-en-Y procedure performed. Persistent weight loss.    TECHNIQUE/EXAM DESCRIPTION AND NUMBER OF VIEWS:  Single contrast upper GI series with small bowel follow-through.    COMPARISON:  None available.    FINDINGS:     view of the abdomen demonstrates surgical changes within the abdomen..    Multiple images were obtained following oral administration of contrast in a single contrast fashion. The patient had limited tolerance of oral barium and therefore a single contrast study was performed.    The esophagus is normal in appearance. There is no evidence of stricture or mass. There is no evidence of hiatal hernia.  The stomach and duodenum are normal.  No evidence of mass or peptic ulcer. There are surgical changes consistent with side-to-side anastomosis of a loop of jejunum to the gastric body. The anastomosis is patent without delay of contrast or evidence of   stricture. Contrast also extends into the duodenum which is normal.  No gastroesophageal reflux is seen during the course of the study.    Additional images were obtained to follow the course of barium into the colon. Contrast reaches the colon within 90 minutes. There is no evidence of focally fixed, dilated, , or angulated loops of bowel. Spot views of the terminal ileum are   normal. 28 fluoroscopic images obtained. A total of 1.5 minutes fluoroscopy time used.  Impression: 1.  Surgical change consistent with prior side-to-side anastomosis  with a loop of jejunum and the gastric antrum. There is no evidence of stricture or delay in passage of contrast across the anastomosis.    2.  Normal small bowel follow-through with contrast reaching the colon within 90 minutes.        MDM Data Review:  -Records reviewed and summarized in current documentation  -I personally reviewed and interpreted the laboratory results  -I personally reviewed the radiology images  -I have personally reviewed medications    Hospital Problem List:  Active Hospital Problems    Diagnosis     Malfunction of jejunostomy tube (HCC) [K94.13]     Malfunctioning jejunostomy tube (HCC) [K94.13]     Severe protein-calorie malnutrition (HCC) [E43]     Underweight due to inadequate caloric intake [R63.6]     Hypothyroidism [E03.9]     Gastroparesis [K31.84]     Pancreatitis, chronic (HCC) [K86.1]     Anorexia [R63.0]        Impressions:  68-year-old female with malfunctioning jejunostomy tube.  The risk, benefits, and alternatives were discussed in detail. Risks include bowel perforation, procedure related bleeding event, infection, inability to safely complete the exam, sedation related complications. The patient, understanding the discussion, consents to proceed forward.      Recommendations:  Upper enteroscopy for J tube revision with fluoroscopy   Heterosexual

## 2024-02-02 NOTE — CARE PLAN
The patient is Stable - Low risk of patient condition declining or worsening    Shift Goals  Clinical Goals: IV fluids, rest, NPO at midnight  Patient Goals: Rest, increase intake  Family Goals: Updates    Progress made toward(s) clinical / shift goals:  Patient verbalizes understanding of plan of care. Patient c/o minimal discomfort at J-tube site. Site is [facility]. Patient will have J-tube revision today, scheduled for 1240.  Problem: Knowledge Deficit - Standard  Goal: Patient and family/care givers will demonstrate understanding of plan of care, disease process/condition, diagnostic tests and medications  Description: Target End Date:  1-3 days or as soon as patient condition allows    Document in Patient Education    1.  Patient and family/caregiver oriented to unit, equipment, visitation policy and means for communicating concern  2.  Complete/review Learning Assessment  3.  Assess knowledge level of disease process/condition, treatment plan, diagnostic tests and medications  4.  Explain disease process/condition, treatment plan, diagnostic tests and medications  Outcome: Progressing     Problem: Pain - Standard  Goal: Alleviation of pain or a reduction in pain to the patient’s comfort goal  Description: Target End Date:  Prior to discharge or change in level of care    Document on Vitals flowsheet    1.  Document pain using the appropriate pain scale per order or unit policy  2.  Educate and implement non-pharmacologic comfort measures (i.e. relaxation, distraction, massage, cold/heat therapy, etc.)  3.  Pain management medications as ordered  4.  Reassess pain after pain med administration per policy  5.  If opiods administered assess patient's response to pain medication is appropriate per POSS sedation scale  6.  Follow pain management plan developed in collaboration with patient and interdisciplinary team (including palliative care or pain specialists if applicable)  Outcome: Progressing       Patient is not  progressing towards the following goals: N/A

## 2024-02-02 NOTE — PROGRESS NOTES
Report given to YOUSUF Cifuentes in PACU. Patient A&O x4 on RA with normal WOB. Patient has no c/o pain at this time. VSS. Patient scheduled for approximately 1200.

## 2024-02-02 NOTE — CARE PLAN
The patient is Watcher - Medium risk of patient condition declining or worsening    Shift Goals  Clinical Goals: Jtube revision  Patient Goals: comfort, nutrition  Family Goals: Same as above    Progress made toward(s) clinical / shift goals:  Procedure to be completed; restart feeds post op.     Patient is not progressing towards the following goals:

## 2024-02-02 NOTE — PROGRESS NOTES
1/26/24: Called PCP Zoey Thurman regarding need for orders to be placed for Kathy to get phosphorus and magnesium measured every day for 3-7 days after she starts her tube feeds.     1/29/24: Called and LM with PCP regarding adding potassium to that order sheet to be measured to monitor for refeeding syndrome. Messaged patient regarding the same on 2/2/24.

## 2024-02-02 NOTE — CARE PLAN
The patient is Watcher - Medium risk of patient condition declining or worsening    Shift Goals  Clinical Goals: Jtube revision  Patient Goals: comfort, nutrition  Family Goals: Same as above    Progress made toward(s) clinical / shift goals:    Problem: Knowledge Deficit - Standard  Goal: Patient and family/care givers will demonstrate understanding of plan of care, disease process/condition, diagnostic tests and medications  Description: Target End Date:  1-3 days or as soon as patient condition allows    Document in Patient Education    1.  Patient and family/caregiver oriented to unit, equipment, visitation policy and means for communicating concern  2.  Complete/review Learning Assessment  3.  Assess knowledge level of disease process/condition, treatment plan, diagnostic tests and medications  4.  Explain disease process/condition, treatment plan, diagnostic tests and medications  2/2/2024 1559 by Marika Wade, R.N.  Outcome: Progressing  2/2/2024 1558 by Marika Wade, R.N.  Outcome: Progressing       Patient is not progressing towards the following goals:

## 2024-02-02 NOTE — PROGRESS NOTES
2 RN Skin Assessment Completed by YOUSUF Smith and DAMI Bull     Head: WDL  Ears: WDL  Nose: WDL  Mouth: WDL  Neck: WDL  Breasts/Chest: WDL  Shoulder Blades: WDL  Spine: WDL  (R) Arm/Elbow/hand: WDL  (L) Arm/Elbow/hand: WDL  Abdomen: Recent j-tube placement to [facility]. Clean dry and intact. Not patent to use.  Groin: WDL  Sacrum/Coccyx/Buttocks: blanching  (R) Leg: WDL  (L) Leg: WDL  (R) Heel/Foot/Toe: blanching  (L) Heel/Foot/Toe: blanching              Devices in place: BP Cuff and Pulse Ox     Interventions in place: Gray ear foams and Pillows     Possible skin injury found: No     Pictures uploaded into Epic: N/A  Wound Consult Placed: N/A

## 2024-02-03 NOTE — OR NURSING
1611 received patient from OR. Report from Anesthesiologist and OR RN. Patient on 3L at 97 % O2. VSS. Monitor connected. No airway in place. S/P EGD with J tube revision, incision visualized, CDI. Patient reports no pain or nausea at this time.    1628  at bedside, updated on patients status and plan of care.     1641 MD Rodgers notified about patients heart rate. Awaiting response.    1709 MD Rodgers at bedside, per MD no new orders. Patients  stats BP and heart rate run low as her baseline.     1715 Report given to floor YOUSUF Hairston. Patient placed on transport.     1740 Patient escorted back to room via gurney by transport.  following.

## 2024-02-03 NOTE — PROGRESS NOTES
J-tube trial initiated, 25 cc of tube feed administered via J-tube, flushed with water before and after administration. J-tube patent at this time. Patient has no c/o pain or discomfort following the administration of tube feed. Will monitor for 1 hour to see patient response to intervention. PA notified of positive pass. If patient does not have worsening pain or nausea, patient will discharge per PA orders. Home education given. Patient and  verbalize understanding of education.

## 2024-02-03 NOTE — DISCHARGE INSTRUCTIONS
Discharge Instructions    Discharged to home by car with relative. Discharged via wheelchair, hospital escort: Yes.  Special equipment needed: Not Applicable    Be sure to schedule a follow-up appointment with your primary care doctor or any specialists as instructed.     Discharge Plan:   Diet Plan: Discussed  Activity Level: Discussed  Confirmed Follow up Appointment: Patient to Call and Schedule Appointment  Confirmed Symptoms Management: Discussed  Medication Reconciliation Updated: Yes  Influenza Vaccine Indication: Patient Refuses    I understand that a diet low in cholesterol, fat, and sodium is recommended for good health. Unless I have been given specific instructions below for another diet, I accept this instruction as my diet prescription.   Other diet: tube feed    Special Instructions: None    -Is this patient being discharged with medication to prevent blood clots?  No    Is patient discharged on Warfarin / Coumadin?   No   -Follow up with PCP in one week  -Return to ER if you have issues with J tube, abdominal pain, excess bleeding

## 2024-02-03 NOTE — PROGRESS NOTES
This RN received report from YOUSUF Hairston. Assumed care of patient. Patient A&Ox 4, with normal WOB on RA. Patient updated on plan of care, no further needs at this time. Bed in low and locked position, call light within reach.

## 2024-02-03 NOTE — DISCHARGE SUMMARY
Discharge Summary    CHIEF COMPLAINT ON ADMISSION  J tube malfunction      Reason for Admission  J tube malfuntion     Admission Date  2/1/2024    CODE STATUS  Full Code    HPI & HOSPITAL COURSE  Kathy Dumont is a 68 y.o. female with a past medical history of idiopathic gastroparesis, chronic pancreatitis, anorexia nervosa, possible celiac disease, osteoporosis, IgA deficiency, hypothyroidism, Sinai en Y gastric bypass who presented 2/1/2024 with malfunction of J-tube.  Patient underwent GJ tube placement on 1/30/2024.  On 2/1 when patient and  attempted to push feed through the tube it was met with resistance.  They were unable to push any fluids through the tube.  They presented to an outside ER where the tube was noted to be malfunctioning.  A KUB revealed a kink in the tube and patient was transferred to [facility] for further evaluation and GI consultation.  At this time the patient denies any fevers, chills, nausea, worsening abdominal pain or diarrhea.    She was started on D5 NS and made n.p.o. on 2/1.  On 2/2 she underwent revision of J-tube placement.  Procedure was well-tolerated.  Per GI recommendations her J-tube was trialed with 25 mL of tube feed which she also tolerated well.  Based on GI recommendations she is cleared for discharge.      Therefore, she is discharged in good and stable condition to home with close outpatient follow-up.    The patient recovered much more quickly than anticipated on admission.    Discharge Date  02/02/24      FOLLOW UP ITEMS POST DISCHARGE  -Follow up with PCP in one week  -Return to ER if you have issues with J tube, abdominal pain, excess bleeding    DISCHARGE DIAGNOSES  Principal Problem (Resolved):    Malfunctioning jejunostomy tube (HCC) (POA: Yes)  Active Problems:    Gastroparesis (POA: Yes)    Pancreatitis, chronic (HCC) (POA: Yes)    Anorexia (POA: Yes)    Hypothyroidism (POA: Yes)    Underweight due to inadequate caloric intake (POA: Yes)     "Severe protein-calorie malnutrition (HCC) (POA: Yes)  Resolved Problems:    Malfunction of jejunostomy tube (HCC) (POA: Yes)      FOLLOW UP  Future Appointments   Date Time Provider Department Center   2/7/2024  9:00 AM Kat Sethi   2/7/2024 12:30 PM Claudia Oakes M.D. [facility] None   2/29/2024  1:00 PM BOONE Fields P.A.-C.  [facility] [place] [id number] [phone]    Follow up in 1 week(s)        MEDICATIONS ON DISCHARGE     Medication List        CONTINUE taking these medications        Instructions   CALCIUM PO   Take 1 Tablet by mouth every day.  Dose: 1 Tablet     * Creon [id number] units Cpep  Generic drug: Pancrelipase (Lip-Prot-Amyl)   Take 2 Capsules by mouth 3 times a day. Take 2 capsules per each meal, and 1 capsule per snack  Dose: 2 Capsule     * Pancrelipase (Lip-Prot-Amyl) [id number] units Cpep   Take 2 Capsules by mouth 3 times a day with meals.  Dose: 2 Capsule     levothyroxine 50 MCG Tabs  Commonly known as: Synthroid   Take 50 mcg by mouth every morning on an empty stomach.  Dose: 50 mcg     multivitamin Tabs   Take 1 Tablet by mouth every day.  Dose: 1 Tablet     ondansetron 4 MG Tabs tablet  Commonly known as: Zofran   Take 4 mg by mouth every 8 hours as needed for Nausea/Vomiting.  Dose: 4 mg     oxyCODONE CR 40 MG T12a tablet  Commonly known as: OxyCONTIN   Take 40 mg by mouth every 12 hours as needed.  Dose: 40 mg           * This list has 2 medication(s) that are the same as other medications prescribed for you. Read the directions carefully, and ask your doctor or other care provider to review them with you.                  Allergies  Allergies   Allergen Reactions    Hydrocodone-Acetaminophen Rash     Rash, \"I can tolerate in small amounts\"      Loratadine-Pseudoephedrine Rash     rash    Sertraline Hcl Palpitations    Tramadol Vomiting     Fainting    Zoloft Rash     rash    Codeine " "Vomiting    Gluten Meal      \"violent stomach upset for 4-5 days\"       DIET  Orders Placed This Encounter   Procedures    Diet Order Diet: Full Liquid     Standing Status:   Standing     Number of Occurrences:   1     Order Specific Question:   Diet:     Answer:   Full Liquid [11]       ACTIVITY  As tolerated.  Weight bearing as tolerated    CONSULTATIONS  GI    PROCEDURES  J tube replacement    LABORATORY  Lab Results   Component Value Date    SODIUM 135 02/02/2024    POTASSIUM 3.7 02/02/2024    CHLORIDE 104 02/02/2024    CO2 24 02/02/2024    GLUCOSE 111 (H) 02/02/2024    BUN 13 02/02/2024    CREATININE 0.50 02/02/2024        Lab Results   Component Value Date    WBC 3.9 (L) 02/02/2024    HEMOGLOBIN 11.0 (L) 02/02/2024    HEMATOCRIT 32.3 (L) 02/02/2024    PLATELETCT 188 02/02/2024        Please note that this dictation was created using voice recognition software. I have made every reasonable attempt to correct obvious errors, but there may be errors of grammar and possibly content that I did not discover before finalizing the note.    Electronically signed by:  RADHA Hernandez PA-C  Hospitalist Services  [facility]    "

## 2024-02-03 NOTE — HOSPITAL COURSE
Kathy Dumont is a 68 y.o. female with a past medical history of idiopathic gastroparesis, chronic pancreatitis, anorexia nervosa, possible celiac disease, osteoporosis, IgA deficiency, hypothyroidism, Sinai en Y gastric bypass who presented 2/1/2024 with malfunction of J-tube.  Patient underwent GJ tube placement on 1/30/2024.  On 2/1 when patient and  attempted to push feed through the tube it was met with resistance.  They were unable to push any fluids through the tube.  They presented to an outside ER where the tube was noted to be malfunctioning.  A KUB revealed a kink in the tube and patient was transferred to [facility] for further evaluation and GI consultation.  At this time the patient denies any fevers, chills, nausea, worsening abdominal pain or diarrhea.    She was started on D5 NS and made n.p.o. on 2/1.  On 2/2 she underwent revision of J-tube placement.  Procedure was well-tolerated.  Per GI recommendations her J-tube was trialed with 25 mL of tube feed which she also tolerated well.  Based on GI recommendations she is cleared for discharge.

## 2024-02-03 NOTE — ANESTHESIA TIME REPORT
Anesthesia Start and Stop Event Times       Date Time Event    2/2/2024 1509 Ready for Procedure     1520 Anesthesia Start     1613 Anesthesia Stop          Responsible Staff  02/02/24      Name Role Begin End    Eduardo Rodgers M.D. Anesth 1520 1613          Overtime Reason:  overtime with call-back    Comments:

## 2024-02-03 NOTE — PROGRESS NOTES
Patient and  given discharge education. Patient verbalizes understanding of discharge education. All questions answered at this time. Discharge paperwork raquel and dated. Discharge instructions copy given to patient. All belongings sent with patient. Patient taken down in wheelchair with .  providing transportation to home.

## 2024-02-03 NOTE — ANESTHESIA POSTPROCEDURE EVALUATION
Patient: Kathy Dumont    Procedure Summary       Date: 02/02/24 Room / Location: [facility] ROOM 26 / SURGERY SAME DAY [facility]    Anesthesia Start: 1520 Anesthesia Stop: 1613    Procedures:       GASTROSCOPY WITH J TUBE REVISION (Esophagus)      GASTROSCOPY (Esophagus)      EGD, WITH CLIP PLACEMENT (Esophagus) Diagnosis: (J tube revision)    Surgeons: Davin Garces M.D. Responsible Provider: Eduardo Rodgers M.D.    Anesthesia Type: general ASA Status: 4            Final Anesthesia Type: general  Last vitals  BP   Blood Pressure : 112/59    Temp   36.2 °C (97.2 °F)    Pulse   81   Resp   16    SpO2   97 %      Anesthesia Post Evaluation    Patient location during evaluation: PACU  Patient participation: complete - patient participated  Level of consciousness: awake and alert  Pain score: 0    Airway patency: patent  Anesthetic complications: no  Cardiovascular status: hemodynamically stable  Respiratory status: acceptable  Hydration status: euvolemic    PONV: none          No notable events documented.     Nurse Pain Score: 3 (NPRS)

## 2024-02-07 ENCOUNTER — HOSPITAL ENCOUNTER (OUTPATIENT)
Dept: LAB | Facility: MEDICAL CENTER | Age: 69
End: 2024-02-07
Attending: INTERNAL MEDICINE
Payer: MEDICARE

## 2024-02-07 ENCOUNTER — NON-PROVIDER VISIT (OUTPATIENT)
Dept: INTERNAL MEDICINE | Facility: OTHER | Age: 69
End: 2024-02-07
Payer: MEDICARE

## 2024-02-07 ENCOUNTER — OFFICE VISIT (OUTPATIENT)
Dept: GASTROENTEROLOGY | Facility: MEDICAL CENTER | Age: 69
End: 2024-02-07
Attending: INTERNAL MEDICINE
Payer: MEDICARE

## 2024-02-07 VITALS
OXYGEN SATURATION: 94 % | RESPIRATION RATE: 14 BRPM | TEMPERATURE: 98.1 F | DIASTOLIC BLOOD PRESSURE: 62 MMHG | BODY MASS INDEX: 11.34 KG/M2 | HEART RATE: 78 BPM | HEIGHT: 63 IN | SYSTOLIC BLOOD PRESSURE: 88 MMHG | WEIGHT: 64 LBS

## 2024-02-07 VITALS — BODY MASS INDEX: 11.37 KG/M2 | WEIGHT: 64.2 LBS

## 2024-02-07 DIAGNOSIS — R63.6 UNDERWEIGHT DUE TO INADEQUATE CALORIC INTAKE: ICD-10-CM

## 2024-02-07 DIAGNOSIS — E43 SEVERE PROTEIN-CALORIE MALNUTRITION (HCC): ICD-10-CM

## 2024-02-07 DIAGNOSIS — K30 DELAYED GASTRIC EMPTYING: ICD-10-CM

## 2024-02-07 DIAGNOSIS — R63.0 ANOREXIA: ICD-10-CM

## 2024-02-07 DIAGNOSIS — K86.89 PANCREATIC INSUFFICIENCY: ICD-10-CM

## 2024-02-07 LAB
ALBUMIN SERPL BCP-MCNC: 3.6 G/DL (ref 3.2–4.9)
ALBUMIN/GLOB SERPL: 1.5 G/DL
ALP SERPL-CCNC: 155 U/L (ref 30–99)
ALT SERPL-CCNC: 52 U/L (ref 2–50)
ANION GAP SERPL CALC-SCNC: 10 MMOL/L (ref 7–16)
AST SERPL-CCNC: 53 U/L (ref 12–45)
BILIRUB SERPL-MCNC: 0.4 MG/DL (ref 0.1–1.5)
BUN SERPL-MCNC: 10 MG/DL (ref 8–22)
CALCIUM ALBUM COR SERPL-MCNC: 9.5 MG/DL (ref 8.5–10.5)
CALCIUM SERPL-MCNC: 9.2 MG/DL (ref 8.5–10.5)
CHLORIDE SERPL-SCNC: 102 MMOL/L (ref 96–112)
CO2 SERPL-SCNC: 26 MMOL/L (ref 20–33)
CREAT SERPL-MCNC: 0.35 MG/DL (ref 0.5–1.4)
GFR SERPLBLD CREATININE-BSD FMLA CKD-EPI: 111 ML/MIN/1.73 M 2
GLOBULIN SER CALC-MCNC: 2.4 G/DL (ref 1.9–3.5)
GLUCOSE SERPL-MCNC: 79 MG/DL (ref 65–99)
MAGNESIUM SERPL-MCNC: 1.8 MG/DL (ref 1.5–2.5)
PHOSPHATE SERPL-MCNC: 2.9 MG/DL (ref 2.5–4.5)
POTASSIUM SERPL-SCNC: 3.8 MMOL/L (ref 3.6–5.5)
PROT SERPL-MCNC: 6 G/DL (ref 6–8.2)
SODIUM SERPL-SCNC: 138 MMOL/L (ref 135–145)

## 2024-02-07 PROCEDURE — 84100 ASSAY OF PHOSPHORUS: CPT

## 2024-02-07 PROCEDURE — 83735 ASSAY OF MAGNESIUM: CPT

## 2024-02-07 PROCEDURE — 97803 MED NUTRITION INDIV SUBSEQ: CPT | Mod: GZ | Performed by: DIETITIAN, REGISTERED

## 2024-02-07 PROCEDURE — 3074F SYST BP LT 130 MM HG: CPT | Performed by: INTERNAL MEDICINE

## 2024-02-07 PROCEDURE — 80053 COMPREHEN METABOLIC PANEL: CPT

## 2024-02-07 PROCEDURE — 99214 OFFICE O/P EST MOD 30 MIN: CPT | Performed by: INTERNAL MEDICINE

## 2024-02-07 PROCEDURE — 3078F DIAST BP <80 MM HG: CPT | Performed by: INTERNAL MEDICINE

## 2024-02-07 PROCEDURE — 99212 OFFICE O/P EST SF 10 MIN: CPT | Performed by: INTERNAL MEDICINE

## 2024-02-07 PROCEDURE — 36415 COLL VENOUS BLD VENIPUNCTURE: CPT

## 2024-02-07 ASSESSMENT — ENCOUNTER SYMPTOMS
NEUROLOGICAL NEGATIVE: 1
MUSCULOSKELETAL NEGATIVE: 1
GASTROINTESTINAL NEGATIVE: 1
EYES NEGATIVE: 1
CONSTITUTIONAL NEGATIVE: 1
RESPIRATORY NEGATIVE: 1

## 2024-02-07 ASSESSMENT — FIBROSIS 4 INDEX
FIB4 SCORE: 2.4
FIB4 SCORE: 2.4

## 2024-02-07 NOTE — PROGRESS NOTES
"Kathy is here for FU with RD for Protein calorie malnutrition, underweight, and start of tube feeds for her PEG-J placement  She had original tube place last on 1/30 but the next day when she went to start her overnight tube feeds she was not able to get it to work  She went to  who then recommended she go to the ER where they found a blockage and redid the tube on 2/1     She did start the tube feeds on Saturday - did 4 hours at once and then another 4 hours later for her first day to get the full bottle of Petpamen 1.5 in per my recommendation (peptide formula with higher calories/ml)    Finds that she is really full/bloated/gassy in the morning since she stops the feeding closer to breakfast than she would like   Tolerated 40 mL in the hospital when they practiced the bolus after the tube was placed and tolerated it well     wants to start tonight with a new plan to start continuous feeds at 7:30p-8p (vs 10-11pm) so she can stop earlier in the morning so she can then eat breakfast    Currently not able to eat much - no appetite until midafternoon     Has not gotten her labs one - - did  her lab slip and plan to do labs tomorrow     and Kathy feel like she is doing better   Taking some laps around every few hours for movement/activity     Weight was down to 54# after both surgeries in the hospital     Seeing Dr. Oakes today at 12:30pm     has taken over care of the Tfs and doing water flushes of 25mL before and after feeds = 50 mL water added per day (she currently does not drink many fluids and they have noticed that she is peeing more often)    So far, she has taken in well:   Saturday 250mL  25mL/hr over 8 hours  Sunday 250 mL  Monday 250 mL  Tuesday 250 mL     24 hour recall  B - \"little bit of coffee and dry cereal\"  L - a few chips, baby food (blueberry, blackberry)   D - 3 shrimp, baby food (pears, zuchinni, corn)  S - 2.5 scoops of ice cream, a cookie    Will need a new rx " "for TF formula as we work up her mL overnight to get her calories up   Has not tried the MCT oil yet   Still taking Creon - takes it at dinner when she eats which she feels works for her but not with her TF yet    Had a bowel movement last night - \"small\" and prior to that was Sunday before her surgery     Overall Kathy seems to be tolerating the Tfs well at 25mL/hr over about 8 hours per night with water flushes. Recommend that they do as planned to start TF earlier in the evening to see if we can get her to tolerate a higher appetite for breakfast as she is missing her food. The goal is to continue to allow her to eat but do suspect that she will adjust as we stay consistent with the TF. Recommend we work up to 50-75 mL per hour slowly. Kathy is only agreeable to go up to 40mL in the next 5-7 days.    She has not gotten her labs done yet despite my recommendation due to insurance saying they will only cover 1x/week and just received her lab slip.  Strongly encouraged her to get labs today since she is in town to assess potassium, mag and phos to make sure they are not low and to do again for the next 2 days to ensure that those do not decrease especially since she is seeing water rentention already    Her weight is up but also likely due to her water retention so encouraged those labs as well.   Recommend she take her Creon at night with the Tfs to see if we can see less bloating and better tolerance as we also increase her rate over time.     As long as her potassium, mag and phos are normal with today's labs, ok to move forward with increasing her TF rate at night slowly per Kathy's willingness.    RTC with RD in 2 weeks    Time spent: 60 minutes  "

## 2024-02-07 NOTE — PATIENT INSTRUCTIONS
Goals:  Move the tube feed start earlier in the night - start at 7:30pm vs 10pm  Work on increasing your tube feed rate at night - currently at 25mL and go up to 40 mL and then up to 50 mL. Each step up stay for one week before increasing   Take your Creon with your tube feeds at night  Start with 1 tsp of MCT oil   I will reach out to your doctor regarding the need for a new prescription  I need to make sure you get your labs done for Potassium, Phosphorus, and Magnesium for 3 days in a row (each day) minimum starting today. Talk to the surgeon/PCP regarding coverage for the labs   Please verify for me on your mailed lab slip that all three labs are checked

## 2024-02-07 NOTE — PROGRESS NOTES
Gastroenterology Initial Note               Author:  Claudia Oakes M.D. Date & Time Created: 2/7/2024 12:38 PM       Patient ID:  Name:             Kathy Dumont  YOB: 1955  Age:                 68 y.o.  female  MRN:               [id number]        Presenting Chief Complaint:  Chief Complaint   Patient presents with    Follow-Up         History of Present Illness:    This is a very pleasant 68 y.o. female with inability to gain weight and history of gastroparesis s/p gastrojejunostomy to facilitate drainage.  Her weight had dropped to 57# and she agreed to PEG tube placement with jejunal extension tube.  Procedure on 1/30/24 was complicated by equipment malfunction and 20 Fr PEG removed and 24 Fr PEG placed.  Difficult advancing into jejunum.  Unfortunately it was difficult for her to use and jshe was readmitted and EGD following day replaced the 12 Fr jejunal tube on 2/2/24.  The endoscopist clipped using a Mantis clip to the jejunal wall.    She has been to dietitian for follow up today.  Patient started tube feeding 5 days ago and used for 4 hours and then repeated 4 hours at 25 ml/hr.  Goal is 50 ml/hr.  Now patient is at 8 hrs/night but due to some bloating and gas may start earlier in the evening and less bloated near breakfast time.  Patient has not had lab work yet.  Today her weight is 64# and previous weight on 12/20/23 was 59#.    In terms of bowel habits, they are about the same.        Review of Systems:  Review of Systems   Constitutional: Negative.    HENT: Negative.     Eyes: Negative.    Respiratory: Negative.     Cardiovascular:  Positive for leg swelling.   Gastrointestinal: Negative.    Genitourinary: Negative.    Musculoskeletal: Negative.    Skin: Negative.    Neurological: Negative.              Past Medical History:  Past Medical History:   Diagnosis Date    Acute recurrent pancreatitis 08/2021    Anesthesia     PONV    Arthritis 08/2021    fingers     Celiac disease     Disorder of thyroid     hypothyroid    Gastroparesis 08/2021    Gluten intolerance     Pain stomach issues           Past Surgical History:  Past Surgical History:   Procedure Laterality Date    FL UPPER GI ENDOSCOPY,DIAGNOSIS N/A 2/2/2024    Procedure: GASTROSCOPY WITH J TUBE REVISION;  Surgeon: Davin Garces M.D.;  Location: SURGERY SAME DAY [facility];  Service: Gastroenterology    FL UPPER GI ENDOSCOPY,DIAGNOSIS N/A 2/2/2024    Procedure: GASTROSCOPY;  Surgeon: Davin Garces M.D.;  Location: SURGERY SAME DAY [facility];  Service: Gastroenterology    FL UPPER GI ENDOSCOPY,CTRL BLEED N/A 2/2/2024    Procedure: EGD, WITH CLIP PLACEMENT;  Surgeon: Davin Garces M.D.;  Location: SURGERY SAME DAY [facility];  Service: Gastroenterology    FL UPPER GI ENDOSCOPY,DIAGNOSIS N/A 1/30/2024    Procedure: GASTROSCOPY;  Surgeon: Claudia Oakes M.D.;  Location: SURGERY SAME DAY [facility];  Service: Gastroenterology    GASTROSTOMY N/A 1/30/2024    Procedure: PERCUTANEOUS ENDOSCOPIC GASTROSTOMY WITH TUBE PLACEMENT, JEJUNOSTOMY TUBE;  Surgeon: Claudia Oakes M.D.;  Location: SURGERY SAME DAY [facility];  Service: Gastroenterology    GASTROJEJUNOSTOMY N/A 05/05/2023    Procedure: MAIK-EN-Y GASTROJEJUNOSTOMY BYPASS AND EXPLORATORY LAPAORTOMY;  Surgeon: Bradly Cisneros M.D.;  Location: [facility];  Service: General    FL UPPER GI ENDOSCOPY,DIAGNOSIS  04/18/2022    Procedure: GASTROSCOPY - WITH PYLORUS DILATION WITH BOTOX INJECTION;  Surgeon: Rivas Cadet M.D.;  Location: [facility];  Service: Gastroenterology    FL ERCP,DIAGNOSTIC  04/18/2022    Procedure: ERCP (ENDOSCOPIC RETROGRADE CHOLANGIOPANCREATOGRAPHY);  Surgeon: Rivas Cadet M.D.;  Location: [facility];  Service: Gastroenterology    FL UPPER GI ENDOSCOPY,DIAGNOSIS N/A 08/09/2021    Procedure: GASTROSCOPY;  Surgeon: Rivas Cadet M.D.;  Location: [facility];  Service: EUS     "CT ERCP,DIAGNOSTIC N/A 08/09/2021    Procedure: ERCP, DIAGNOSTIC;  Surgeon: Rivas Cadet M.D.;  Location: SURGERY [facility];  Service: EUS    EGD W/ENDOSCOPIC ULTRASOUND N/A 08/09/2021    Procedure: EGD, WITH ENDOSCOPIC US - UPPER RADIAL;  Surgeon: Rivas Cadet M.D.;  Location: SURGERY [facility];  Service: EUS    ORIF, HUMERUS Left 2006    KNEE ARTHROSCOPY Left 1973    CHOLECYSTECTOMY  1963    APPENDECTOMY CHILD      OTHER  5-21-23    PRIMARY C SECTION  1984, 1987, 1990         Current Outpatient Medications:  Current Outpatient Medications   Medication Sig    oxyCODONE CR (OXYCONTIN) 40 MG Tablet Extended Release 12 hour Abuse-Deterrent tablet Take 40 mg by mouth every 12 hours as needed.    Pancrelipase, Lip-Prot-Amyl, 89085-450817 units Cap DR Particles Take 2 Capsules by mouth 3 times a day with meals.    Pancrelipase, Lip-Prot-Amyl, (CREON) 29555-635178 units Cap DR Particles Take 2 Capsules by mouth 3 times a day. Take 2 capsules per each meal, and 1 capsule per snack    CALCIUM PO Take 1 Tablet by mouth every day.    multivitamin Tab Take 1 Tablet by mouth every day.    ondansetron (ZOFRAN) 4 MG Tab tablet Take 4 mg by mouth every 8 hours as needed for Nausea/Vomiting.    levothyroxine (SYNTHROID) 50 MCG Tab Take 50 mcg by mouth every morning on an empty stomach.         Medication Allergies:  Allergies   Allergen Reactions    Hydrocodone-Acetaminophen Rash     Rash, \"I can tolerate in small amounts\"      Loratadine-Pseudoephedrine Rash     rash    Sertraline Hcl Palpitations    Tramadol Vomiting     Fainting    Zoloft Rash     rash    Codeine Vomiting    Gluten Meal      \"violent stomach upset for 4-5 days\"         Family Medical History:  No family history on file.      Social History:  Social History     Tobacco Use    Smoking status: Never     Passive exposure: Never    Smokeless tobacco: Never   Vaping Use    Vaping Use: Never used   Substance Use Topics    Alcohol use: Never    " "Drug use: Not Currently     Types: Oral     Comment: cbd/thc, daily for pain - last took 5/3/2023         Vital signs:  There were no vitals taken for this visit.      Physical Exam:  Physical Exam  Constitutional:       General: She is not in acute distress.     Appearance: She is not toxic-appearing or diaphoretic.      Comments: underweight   HENT:      Head: Normocephalic and atraumatic.      Nose: Nose normal.      Mouth/Throat:      Mouth: Mucous membranes are moist.   Eyes:      Pupils: Pupils are equal, round, and reactive to light.   Cardiovascular:      Rate and Rhythm: Regular rhythm.      Heart sounds: Normal heart sounds.   Pulmonary:      Effort: Pulmonary effort is normal.      Breath sounds: Normal breath sounds.   Abdominal:      Comments: Distended, tympany throughout, BS +, nontender;  PEG site clean without erythema, induration or drainage   Skin:     General: Skin is warm and dry.      Comments: No appreciable edema   Neurological:      Mental Status: She is alert and oriented to person, place, and time.             Labs:  No results for input(s): \"SODIUM\", \"POTASSIUM\", \"CHLORIDE\", \"CO2\", \"BUN\", \"CREATININE\", \"MAGNESIUM\", \"PHOSPHORUS\", \"CALCIUM\" in the last 72 hours.  No results for input(s): \"ALTSGPT\", \"ASTSGOT\", \"ALKPHOSPHAT\", \"TBILIRUBIN\", \"DBILIRUBIN\", \"GAMMAGT\", \"AMYLASE\", \"LIPASE\", \"ALB\", \"PREALBUMIN\", \"GLUCOSE\" in the last 72 hours.      No results for input(s): \"RBC\", \"HEMOGLOBIN\", \"HEMATOCRIT\", \"PLATELETCT\", \"PROTHROMBTM\", \"APTT\", \"INR\", \"IRON\", \"FERRITIN\", \"TOTIRONBC\" in the last 72 hours.  No results found for this or any previous visit (from the past 24 hour(s)).    Results for orders placed during the hospital encounter of 09/13/23    DX-UPPER GI-SMALL BOWEL FOLLOW THRU    Narrative  9/13/2023 8:22 AM    HISTORY/REASON FOR EXAM:  Delayed gastric emptying with Sinai-en-Y procedure performed. Persistent weight loss.      TECHNIQUE/EXAM DESCRIPTION AND NUMBER OF VIEWS:  Single contrast " upper GI series with small bowel follow-through.    COMPARISON:  None available.    FINDINGS:     view of the abdomen demonstrates surgical changes within the abdomen..    Multiple images were obtained following oral administration of contrast in a single contrast fashion. The patient had limited tolerance of oral barium and therefore a single contrast study was performed.    The esophagus is normal in appearance. There is no evidence of stricture or mass. There is no evidence of hiatal hernia.  The stomach and duodenum are normal.  No evidence of mass or peptic ulcer. There are surgical changes consistent with side-to-side anastomosis of a loop of jejunum to the gastric body. The anastomosis is patent without delay of contrast or evidence of  stricture. Contrast also extends into the duodenum which is normal.  No gastroesophageal reflux is seen during the course of the study.    Additional images were obtained to follow the course of barium into the colon. Contrast reaches the colon within 90 minutes. There is no evidence of focally fixed, dilated, , or angulated loops of bowel. Spot views of the terminal ileum are  normal. 28 fluoroscopic images obtained. A total of 1.5 minutes fluoroscopy time used.    Impression  1.  Surgical change consistent with prior side-to-side anastomosis with a loop of jejunum and the gastric antrum. There is no evidence of stricture or delay in passage of contrast across the anastomosis.    2.  Normal small bowel follow-through with contrast reaching the colon within 90 minutes.      Results for orders placed during the hospital encounter of 07/06/23    CT-ABDOMEN-PELVIS WITH    Narrative  7/10/2023 1:14 PM    HISTORY/REASON FOR EXAM:  hx Sinai en Y gastric bypass 5/5/23; abdominal pain eval.      TECHNIQUE/EXAM DESCRIPTION:   CT scan of the abdomen and pelvis with contrast.    Contrast-enhanced helical scanning was obtained from the diaphragmatic domes through the pubic  symphysis following the bolus administration of nonionic contrast without complication.    100 mL of Omnipaque 350 nonionic contrast was administered without complication.    Low dose optimization technique was utilized for this CT exam including automated exposure control and adjustment of the mA and/or kV according to patient size.    COMPARISON: Outside CT from 7/6/2023 for which no report is available; study from here 5/3/2023    FINDINGS:  Lower Chest: Small BILATERAL pleural effusions with overlying atelectasis.    Liver: Normal.    Spleen: Unremarkable.    Pancreas: Unremarkable.    Gallbladder: Surgically absent.    Biliary: There is pneumobilia, as before.    Adrenal glands: Normal.    Kidneys: Unremarkable without hydronephrosis.    Bowel: No obstruction or acute inflammation. There are changes of gastric bypass    Lymph nodes: No adenopathy.    Vasculature: Unremarkable.    Peritoneum: There is now a small volume of ascites..    Musculoskeletal: No acute or destructive process. There is trace gas in the anterior abdominal wall of the LEFT lower and RIGHT upper quadrants. There are BILATERAL L5 pars defects with grade 1 anterolisthesis of L5 on S1.    Pelvis: No adenopathy or free fluid.    Impression  1.  Changes of gastric bypass  2.  Prior cholecystectomy with pneumobilia as before  3.  Small BILATERAL pleural effusions, new since recent outside study  4.  Ascites, increased since the recent outside study  5.  RIGHT upper and LEFT lower quadrant abdominal wall gas, possibly related to medication administration or other intervention  6.  BILATERAL L5 pars defects with grade 1 anterolisthesis of L5 on S1      Results for orders placed during the hospital encounter of 04/27/23    NM-GASTRIC EMPTYING    Narrative  4/27/2023 2:49 PM    HISTORY/REASON FOR EXAM:  Gastroparesis.      TECHNIQUE/EXAM DESCRIPTION AND NUMBER OF VIEWS:  0.577 uCi Tc 99m sulfur colloid was administered orally mixed with eggs. 90  minutes of planar imaging was then performed.    COMPARISON: 7/20/2021    FINDINGS:  There was no evidence of gastroesophageal reflux during 60 minutes of continuous observation.  Half time of gastric emptying is measured at 204 minutes.    Impression  Gastric emptying halftime measured at 204 minutes consistent with delayed gastric emptying. This previously measured approximately 223.5 minutes      Results for orders placed during the hospital encounter of 07/20/21    NM-GASTRIC EMPTYING-TOUGAS METHOD    Narrative  7/20/2021 2:31 PM    HISTORY/REASON FOR EXAM:  Epigastric abdominal pain, pancreatitis, underweight.    TECHNIQUE/EXAM DESCRIPTION AND NUMBER OF VIEWS:  1.03 mCi Tc 99m sulfur colloid was administered orally as a Tougas.    Planar images were obtained, and residual gastric activity was calculated immediately, and at 1, 2, and 4 hours.    Tougas Technique Normal Values:  Delayed gastric emptying is defined as gastric retention of greater than 10% at 4 hours.  If the patient is unable to complete the 4 hour test, but had no episodes of vomiting, delayed gastric emptying is defined as  greater than 60% retention at 2 hours.    COMPARISON: MRI abdomen 5 9/2/2021    FINDINGS:    After observation the activity remaining in the stomach is as follows:    Immediately:  100%    1 hour:  68%    2 hours:   59%    4 hours:   47%    Impression  Significant retained gastric activity at 4 hours indicating delayed gastric emptying.           MDM (Data Review):   -Records reviewed and summarized in current documentation  -I personally reviewed and interpreted the laboratory results  -I personally reviewed the radiology images    Assessment/Recommendations:    Severe protein calorie malnutrition  2.   Underweight, BMI 11  3.   Hx of gastroparesis  4.   Hx of gastric bypass to facilitate gastric drainage  5.   Pancreatic insufficiency  6.   Remote history of anorexia nervosa    Recs:   Will check labs today.  Will follow up  and then repeat labs.  They are concerned insurance will not pay if she does them daily.  Next labs will be done at [facility] in [place] in 1 week unless gross abnormalities on the labs she does today    Being we don't have an official GI clinic, I encourage her to follow up with Dr. Cadet who can change her tube in 6 months.  It will need to be done endoscopically for jejunal placement via PEG (not as familiar yet with Mantis clip and how easily it is removed).    Rx being done to increase her Peptamen 1.5 to 50cc/hr    Will contact dietitian tomorrow with lab results      Claudia Oakes M.D.

## 2024-02-09 ENCOUNTER — TELEPHONE (OUTPATIENT)
Dept: GASTROENTEROLOGY | Facility: MEDICAL CENTER | Age: 69
End: 2024-02-09
Payer: MEDICARE

## 2024-02-09 NOTE — TELEPHONE ENCOUNTER
Left VM for pt regarding lab results from 2/7/24, results look good, no concerns at this time. Pt to get CMP again on 2/14, will call her with results. Pt getting labs drawn at [facility] in [place], [place].   Sent in an updated TF [facility] for 50mL/h x 8 hours, once pt feels ready to increase.

## 2024-02-15 ENCOUNTER — TELEPHONE (OUTPATIENT)
Dept: GASTROENTEROLOGY | Facility: MEDICAL CENTER | Age: 69
End: 2024-02-15
Payer: MEDICARE

## 2024-02-15 NOTE — TELEPHONE ENCOUNTER
Called [facility] in [place], [place] for lab results (CMP, mag. Phos from 2/14). No results available.     Called and spoke to pt. She will get the labs drawn today, 2/15. RN will request results tomorrow and call to discuss with pt.     Pt stating she is doing well. She is tolerating the tube feeds at 25/mL/h x 8 hours. She is planning on increasing to 40/mL/h on Saturday.   New order for 50mL/h faxed to [facility] on 2/7. (50mL/h is the goal)

## 2024-02-16 ENCOUNTER — TELEPHONE (OUTPATIENT)
Dept: GASTROENTEROLOGY | Facility: MEDICAL CENTER | Age: 69
End: 2024-02-16
Payer: MEDICARE

## 2024-02-16 NOTE — TELEPHONE ENCOUNTER
Received lab results from [facility]. Mag, K, and phos all within normal limits. Lab results scanned into media tab.   Called and discussed lab results with pt. Pt stated her PCP has provided her with multiple lab slips to continue to check her labs weekly while her body adjusts to the tube feeds.     Pt stated she will get her labs done in another week and will follow up with her PCP for those results. Pt has a f/u visit with [facility] dietitian on 2/22/24.

## 2024-02-22 ENCOUNTER — NON-PROVIDER VISIT (OUTPATIENT)
Dept: INTERNAL MEDICINE | Facility: OTHER | Age: 69
End: 2024-02-22
Payer: MEDICARE

## 2024-02-22 VITALS — WEIGHT: 59.4 LBS | BODY MASS INDEX: 10.52 KG/M2

## 2024-02-22 DIAGNOSIS — Z98.84 HISTORY OF ROUX-EN-Y GASTRIC BYPASS: ICD-10-CM

## 2024-02-22 DIAGNOSIS — E43 SEVERE PROTEIN-CALORIE MALNUTRITION (HCC): ICD-10-CM

## 2024-02-22 DIAGNOSIS — R63.4 WEIGHT LOSS, NON-INTENTIONAL: ICD-10-CM

## 2024-02-22 DIAGNOSIS — K31.84 GASTROPARESIS: ICD-10-CM

## 2024-02-22 DIAGNOSIS — K86.1 IDIOPATHIC CHRONIC PANCREATITIS (HCC): ICD-10-CM

## 2024-02-22 DIAGNOSIS — R63.6 UNDERWEIGHT DUE TO INADEQUATE CALORIC INTAKE: ICD-10-CM

## 2024-02-22 PROCEDURE — 97803 MED NUTRITION INDIV SUBSEQ: CPT | Mod: GZ | Performed by: DIETITIAN, REGISTERED

## 2024-02-22 ASSESSMENT — FIBROSIS 4 INDEX: FIB4 SCORE: 2.66

## 2024-02-22 NOTE — PROGRESS NOTES
Kathy is here for FU with RD for protein calorie malnutrition and underweight with PEG-J tube feedings     Here with her  who has been supportive of helping to manage the tube feeds given overnight    Sleeping better but feeling tired -  has noticed that she has been sleeping better and doesn't seem so cold lately   Doing 42 mL per hour for 9 hours = 378 mL of formula = 567 calories per day     Found that by starting the earlier evening feeds (after dinner and bath) that help her with ending at 4am to help with feeling hungry in the morning   Still having to pee in the middle of the night  BM every 4-5 days - about the same with larger BM than before    Getting nervous that she may  not be able to tolerate more mL overnight and wants some extra time getting used to the 42mL    Still can't' eat much food by mouth right now - focused on eating easy to digest food  B - coffee and muffin   L - jello/pudding a few crackers and 2 GF chicken nuggets and some veggie  D -- eggs or scrambled eggs  S- ice cream and cookies in     Making sure she is taking her creon   Added in MCT oil - 1 tsp with her coffee     Given that they are slowly increasing the rate at night, they are using more, and will need a new order written as they still have not gotten any more formula from the new order written for 50 mL written 2 weeks ago.     Helped Kathy with ways to continue to add in calories via oral foods and she was receptive to a few ideas - adding in 200 calories/day along with slowly adding in peptamen 1.5 mL per night - working up to 50 mL and then 60 mL per hour. Set goals; rtc with RD in 2 weeks    Time spent: 30 minutes

## 2024-02-22 NOTE — PATIENT INSTRUCTIONS
Goals:  [facility] number is [phone]  Continue to work up your rate to 50 mL and then 60 mL overnight - I will call Jina to request the order be updated   Add in 2 tsp of MCT oil with your coffee and 1 tsp of peanut butter with your crackers. Stick to the pudding vs the jello  Get your labs done tomorrow

## 2024-02-26 ENCOUNTER — TELEPHONE (OUTPATIENT)
Dept: GASTROENTEROLOGY | Facility: MEDICAL CENTER | Age: 69
End: 2024-02-26
Payer: MEDICARE

## 2024-02-26 NOTE — TELEPHONE ENCOUNTER
Left message with [facility] enteral feedings ([phone]) to make sure they have received the new tube feed order faxed in for 75mL/h continuous over 8 hours.     Pt is getting concerned she is increasing her rate, but is running low on formula.     Asked [facility] for a return call.

## 2024-02-27 ENCOUNTER — TELEPHONE (OUTPATIENT)
Dept: GASTROENTEROLOGY | Facility: MEDICAL CENTER | Age: 69
End: 2024-02-27
Payer: MEDICARE

## 2024-02-27 NOTE — TELEPHONE ENCOUNTER
Called and spoke to Kathy. She received more TF formula on 2/23. She is doing 40mL/h x 9 hours overnight. The last several days she has not been feeling well. Abdominal cramping, loose stools, not able to tolerate much PO food. She has sent a Track[employer] message to her [facility] dietitian for any guidance.     Pt is agreeable to her PCP, Zoey Thurman, taking over ordering supplies and formula for her tube feedings. Will call PCP and discuss pt's current regimen and what she will need going forward.     Pt aware she needs to have her tube replaced in around 6 months at [facility] with Dr Goldman. Pt stated she stopped in to [facility] last week and later received a call stating they have the pt on Dr Goldman's follow up list and will call pt to schedule once his schedule is open.         ADDENDUM  Both PCP and her RN are out of the office today, 2/27. Will call to discuss TF orders when back in the office tomorrow, 2/28.

## 2024-02-28 ENCOUNTER — TELEPHONE (OUTPATIENT)
Dept: GASTROENTEROLOGY | Facility: MEDICAL CENTER | Age: 69
End: 2024-02-28
Payer: MEDICARE

## 2024-02-28 NOTE — TELEPHONE ENCOUNTER
Left VM for JILLIAN Campbell, pt's PCP, or her RN to return call to discuss taking over pt's tube feeding orders. Direct phone number provided.

## 2024-02-28 NOTE — TELEPHONE ENCOUNTER
Spoke with YOUSUF Hardin at pt's PCP office. PCP is willing to take over ordering pt's tube feeding supplies.   Faxed blank order form for [facility] as well as G/J tube op note, dietician note and latest tube feed order to PCP office.   Notified [facility] dietician, pt's PCP will be taking over ordering tube feedings.

## 2024-03-07 ENCOUNTER — NON-PROVIDER VISIT (OUTPATIENT)
Dept: INTERNAL MEDICINE | Facility: OTHER | Age: 69
End: 2024-03-07
Payer: MEDICARE

## 2024-03-07 VITALS — BODY MASS INDEX: 10.66 KG/M2 | WEIGHT: 60.2 LBS

## 2024-03-07 DIAGNOSIS — R63.6 UNDERWEIGHT DUE TO INADEQUATE CALORIC INTAKE: ICD-10-CM

## 2024-03-07 DIAGNOSIS — K31.84 GASTROPARESIS: ICD-10-CM

## 2024-03-07 DIAGNOSIS — K86.89 PANCREATIC INSUFFICIENCY: ICD-10-CM

## 2024-03-07 DIAGNOSIS — F50.01 ANOREXIA NERVOSA, RESTRICTING TYPE: ICD-10-CM

## 2024-03-07 DIAGNOSIS — E43 SEVERE PROTEIN-CALORIE MALNUTRITION (HCC): ICD-10-CM

## 2024-03-07 PROCEDURE — 97803 MED NUTRITION INDIV SUBSEQ: CPT | Performed by: DIETITIAN, REGISTERED

## 2024-03-07 ASSESSMENT — FIBROSIS 4 INDEX: FIB4 SCORE: 2.66

## 2024-03-07 NOTE — PROGRESS NOTES
Kathy is here for FU with RD for protein calorie malnutrition with PEG-J tube feedings    She continues with night time feeds with help of  and trying to slowly work up her rate per night  Finds the first 3 days of increasing her rate she is nausea and more full but then gets better after the 3 days   Feeeling better today - less nausea andrew with increasing Creon per my recommendation in the evening when she starts her tube feeding for the night  Did her labs last week - I will need to call PCP to see what her results are     Using [facility] for her tube feeding formula shipment   Got her last shipment 2 weeks ago     Running 50 ml/hr for 9 hours (starts at 6pm after eating dinner at 5pm until 3-3:30am)  This then allows her to eat breakfast when she eats later in the morning    She has increased the Creon when she starts the Tfs  Last night tried ice cream at 9pm and did take extra Creon to help digest it and she did well with that -- less bloating and pain   Has increased her Creon with dinner due to increase in her TF rate (was taking 12,000 Creon and now take double)     Has been having jello and crackers for lunch and 1/2 pudding and peanut butter   Dinner is 2 chicken nuggets and fish stick and little bit of veggie or mini baked potaotes   Did push with 3 chicken nuggets last night and did feel really full  Tried a flaxseed muffin that was a new food for her and didn't like it   Still doing the MCT oil in her coffee - doing a tsp and a half     Worked with Kathy on finding ways to increase her oral intake to support higher calorie intake and to continue to increase feeds through her tube feeding to get to at 90lbs as a goal weight  Current getting 675 calories from her TF formula every night  Her weight is starting to slowly increase but will need to continue to slowly advance her to 75 mL/hr over the next few week and will likely need to get to a much higher rate (no more than 240ml/hr) to  support adequate weight gain.   Will reach out to PCP to see about her recent labs for phos, potassium, and sodium as they are not in the chart.   Set goals; rtc with RD in 2-3 weeks.     Time spent: 45 minutes

## 2024-03-07 NOTE — PATIENT INSTRUCTIONS
Goals:  I will call Sharee/Zoey regarding your labs and just ensure they know they are going to be doing the prescription  Increase your rate of formula to 69 mL/hr after this next week  Keep 1 tsp of MCT oil with coffee and then put another 1tsp in your pudding at lunch

## 2024-03-20 ENCOUNTER — NON-PROVIDER VISIT (OUTPATIENT)
Dept: INTERNAL MEDICINE | Facility: OTHER | Age: 69
End: 2024-03-20
Payer: MEDICARE

## 2024-03-20 VITALS — BODY MASS INDEX: 11.16 KG/M2 | WEIGHT: 63 LBS

## 2024-03-20 DIAGNOSIS — K30 DELAYED GASTRIC EMPTYING: ICD-10-CM

## 2024-03-20 DIAGNOSIS — E46 MALNUTRITION COMPROMISING BODILY FUNCTION (HCC): ICD-10-CM

## 2024-03-20 DIAGNOSIS — F50.01 ANOREXIA NERVOSA, RESTRICTING TYPE: ICD-10-CM

## 2024-03-20 DIAGNOSIS — K86.89 PANCREATIC INSUFFICIENCY: ICD-10-CM

## 2024-03-20 DIAGNOSIS — R63.0 ANOREXIA: ICD-10-CM

## 2024-03-20 PROCEDURE — 97803 MED NUTRITION INDIV SUBSEQ: CPT | Performed by: DIETITIAN, REGISTERED

## 2024-03-20 ASSESSMENT — FIBROSIS 4 INDEX: FIB4 SCORE: 2.66

## 2024-03-20 NOTE — PROGRESS NOTES
Kathy is here for FU with RD for support with severe protein calorie malnutrition and significantly underweight    She reports things are going well. Continuing to advance her tube feeds at night but also seeing increased nausea/fullness for the first few days after an increase  She did take 2 Creon's with ice cream last night and felt better today so continued to encourage more creon as she increases her tube feeds to help with better digestion    Her weight is up almost 3 lbs from last appointment   Do not have labs from her PCP so not able to see how her electrolytes are handling the increase but she reports no more swelling, and feeling more strength and energy - she can get out of the bath tub by herself and she was able to get out to the garden this week which she hasn't done lately     Taking 2.5 bottles per night =625 mL per night  59 mL per hour and done around 4:30am and starting at 6pm  = 937 calories per night being consumed with her tube feeds     Her weight is up almost 3#    She is noticing that her tube site is starting to have some skin breakdown and getting red  Trying some new things to help take care of it   Ntoicing it isn't getting better  Has not called Zoey yet but planning to see her today     First 3 days of going up to 59 mL was harder for her  She has adjusted to it though and it has really helped her with also the Creon too    Has not been able to eat more - trying to eating a couple of bites    Worked with Kathy on continuing to advance her tube feeds and working to increase via her oral intake which has proved to be a lot more challenging for her   Continued to work on finding ways however to increase her calorie intake via food vs just the tube feeds    Will need PCP to write new prescription for her as we increase her tube feeds - recommend that we make sure the prescription is at 75-90 mL per hour  Our max is going to be 125 mL/hour for tolerance.    Set goals; rtc with RD in 2  weeks     Time spent: 30 minutes

## 2024-03-20 NOTE — PATIENT INSTRUCTIONS
Goals:  Go see Zoey about the skin breakdown around your tube   Stay at 2.5 bottles for another week and then go up 3 bottles per day   Keep working on adding in more at your meals to help with eating more calories - plan to take 2 extra bites per meal to support you with getting in more calories   Add in a mid day water flush after lunch of at least 50 mL and if that does help with your constipation then do after breakfast and again after lunch

## 2024-03-22 ENCOUNTER — PATIENT MESSAGE (OUTPATIENT)
Dept: SURGICAL ONCOLOGY | Facility: MEDICAL CENTER | Age: 69
End: 2024-03-22
Payer: MEDICARE

## 2024-03-22 ENCOUNTER — TELEPHONE (OUTPATIENT)
Dept: SURGICAL ONCOLOGY | Facility: MEDICAL CENTER | Age: 69
End: 2024-03-22
Payer: MEDICARE

## 2024-03-22 NOTE — PROGRESS NOTES
Called patient to follow up regarding concern erythema at feeding tube site.  Picture reviewed. Instructed her to continue monitor for symptoms, in particular worsening pain, erythema, fever, chills. Arranged appt in clinic to see her on 3/26. Instructed her to seek immediate care if more severe symptoms. Sh can continue to use the tube for feeding. She stated she understood this.

## 2024-03-25 NOTE — PROGRESS NOTES
Subjective:   3/26/2024  9:45 AM  Primary care physician: Zoey Thurman P.A.-C.  Referring Provider: Femi Gutierrez M.D.      Chief Complaint:   Chief Complaint   Patient presents with    Follow-Up     HX MAIK EN Y   MALNOURISHED  CREON   FEEDING TUBE        Diagnosis:   1. Chronic pancreatitis, unspecified pancreatitis type (HCC)        2. Anorexia nervosa, restricting type        3. Underweight due to inadequate caloric intake        4. History of Maik-en-Y gastric bypass        5. Pancreatic insufficiency        6. Malnutrition compromising bodily function (HCC)        7. Delayed gastric emptying        8. Encounter for care related to feeding tube          History of presenting illness:    Kathy Dumont is a pleasant 67 y.o. female  female with hx of anorexia nervosa, celiac disease(?), idiopathic gastroparesis, and idiopathic recurrent pancreatitis. She has a long GI history and was previously seen at [facility] and at  in [place], [place]. Her latest weight is 29 kg, which is a slight decrease from December. She has previous attempted numerous medications including Reglan, Creon, Linzess, Domporidon. Her diet consists mostly of jello, baby food, ice cream, Boost drinks. She previously tried working with a nurtitionist - but had no clear results.     GASTRIC EMPTYING STUDY on 7/20/21 noted significant retained gastric activity at 4 hours indicating delayed gastric emptying.     EGD on 8/9/21 noted   EGD FINDINGS:  1.  Esophagus:  Unremarkable mucosa.  2.  Long and J-shaped stomach, otherwise unremarkable mucosa.  3.  Mildly stenotic pylorus, successfully dilated to 20 mm with balloon.  4.  Duodenum:  Unremarkable mucosa.  Possible extrinsic compression of the second portion.     ENDOSCOPIC ULTRASOUND FINDINGS:  1.  Celiac axis, no adenopathy.  2.  Pancreatic body and tail, normal parenchyma.  3.  Pancreatic duct, normal course and caliber.  4.   Gallbladder, surgically absent.  5.  Biliary ampulla, unremarkable.  6.  Common bile duct, generous caliber down to the level of the ampulla.  Normal course.  Small subtle nonshadowing filling defect suspicious of stone versus sludge ball.  7.  Head of pancreas, normal parenchyma and normal dorsal ventral transition.     ERCP FINDINGS:  1.  Biliary ampulla, small, but otherwise unremarkable appearance.  Stenotic to wire and cannula passage.  2.  Pancreatic duct, neither injected nor cannulated.  3.  Common bile duct, normal course.  Generous caliber.  Small distal filling defect consistent with small stone.     EGD by Dr Cadet on 4/18/23 noted unremarkable mucosa, and included intersphincteric injection of Botox - 100U divided 4 quadrants. Hydraulic dilation of pylorus 18-19-20 mm balloon 10-11-12 mm hydraulic dilation of biliary ampulla.     Updated NM GASTRIC EMPTYING study on 4/27/23 noted gastric emptying halftime measured at 204 minutes consistent with delayed gastric emptying. This previously measured approximately 223.5 minutes     She is here today for evaluation for what appears to be chronic gastric outlet obstruction from hypertrophic pylorus, gastroparesis, history of anorexia, and severe malnutrition.  The patient's BMI is 11.21 and she only weighs 29 kg.  This has occurred over the last 4 to 5 years.  She did have a history of anorexia but that resolved over 12 years ago.  The patient appears very cachectic.  She is alert and oriented x3 and very pleasant.  The patient has been scope by gastroenterology multiple times and has had multiple injections of Botox into the pylorus with pyloric dilations.  She states that last for about 2 weeks and then she can eat again.  She is presently on liquids only and continues to lose weight.  We did send her for an updated gastric emptying study and it is positive with delayed of 204 minutes.  She is not taking any narcotics or any type of motility inhibitor.   She is here with her  discuss neck steps.  We have no imaging.  What is also unusual as the patient has suffered multiple bouts of pancreatitis.  She has had some common duct stones that were extracted 2 to 3 years ago and ever since then she has had intermittent pancreatitis.  We have no recent imaging of her pancreas. She has no family history of malignancies, she is never had a malignancy.  She had an open cholecystectomy when she was 11 years old.  It is unclear the reason why.  She is here with her  to discuss what options she has. I have reviewed the notes from GI, and other available records.     Update 5/24/23  She subsequently underwent an Sinai-en-Y gastrojejunostomy bypass on May 5, 2023.  She presented for a 2-week postoperative examination and staple removal. She informed us that she did the surgery has helped and she is able to eat better. She is having multiple bowel movements in a day which are soft  She used to not be able to tolerate food prior to her surgery.  She use to be constipated for up to 7 to 10 days. She had presented to the emergency room on the 15th for swelling.  She was placed on a diuretic discharged home.  She informs me that her swelling has improved significantly.  Has lost 1 pound however that is being on diuretics and also starting to eat more small and frequent meals.  She informs me she was able to take a bite of spaghetti. She has expressed immense gratitude to our team, and Dr. Gutierrez for referring him to us and Dr. Garcia's NP for referring to Dr. Gutierrez.     Update 6/13/23  She is here today for a four week post operative examination.  This is the patient's last postop visit.  She actually has increased the volume of food according to her  and herself.  She intermittently has some nausea but is managing that with Zofran that she has.  Other than that she has no complaints.     Update 8/8/23  Patient admitted from ED on 7/6/23 with abdominal pain, diarrhea,  poor PO intake, and failure to thrive. Sent from OSH with concern for bowel perforation. Discharged home on 7/11/23.     CT ABDOMEN on 7/10/23 noted changes of gastric bypass, prior cholecystectomy with pneumobilia as before. Small BILATERAL pleural effusions, new since recent outside study. Ascites, increased since the recent outside study. RIGHT upper and LEFT lower quadrant abdominal wall gas, possibly related to medication administration or other intervention.      She is here today for follow-up status post admission to the hospital back in early July. The patient had severe diarrhea and severe abdominal pain resulting in nausea and vomiting.  In the hospital the CT scan was read as free air and possible perforation but the patient did not have a perforation.  She was just having what appears to be severe pancreatic exocrine insufficiency and uncontrollable diarrhea and malabsorption problems.  This was corrected but unfortunately she was only given a prescription for 6000 lipase units to take with each meal 1 to 2 tablets.  This is inadequate for actual full absorption.  She continued to lose weight.  I have personally reviewed the patient's CT scan from July 10, 2023.  She is here with her  to discuss neck steps     Update 9/5/23  She had been started on Creon. In follow-up, the patient states that the pancreatic enzymes seem to immediately work for her.  She was having formed stools and tolerating her diet.  Unfortunately over the last several days she developed severe abdominal cramping with severe diarrhea. Even though she is taking the enzymes.  She is also lost weight from 64 pounds to 60 pounds since her last visit.  She states she was doing well until recently she started having abdominal pain and cramping in early satiety.  She was having trouble eating. She would stop eating due to feeling full and bloated. She is here with her  to discuss next steps.     Update 12/12/23  DX SMALL BOWEL  FOLLOW THROUGH on 9/13/23 noted no evidence of stricture or delay in passage of contrast across the anastomosis. Normal small bowel follow-through with contrast reaching the colon within 90 minutes.     She is here for follow-up status post upper GI and small bowel follow-through.  The patient was complaining she is having difficulty eating again.  She did undergo a internal drainage of the pancreas as well as a gastric bypass in the past.  She presently denies any fever or chills but does complain of intermittent nausea.  She states certain foods bother her while others do not.  She continues to lose weight.  She appears to look healthier but she is losing weight.  She is down to 10.97 kg.  The patient states she is trying to eat but is having difficulty.  The upper GI showed no delayed gastric emptying and that the anastomosis is wide open.  She has emptied into her small bowel without difficulty and the contrast makes it to the colon at 90 minutes.  There is no sign of bowel obstruction either.  She is here with her  discuss neck steps.  I personally reviewed the patient's upper GI.    Update 3/26/24  She has had a PEG tube with jejunal extension placed in February 2024, and started a regular feeding protocol.    She is here today joined by her  for concerns with skin irritation/infection near feeding tube. Overall, she reports she is doing well with tube feeds, and that she has been able to increase the volume steadily, and that she has gained several pounds. She runs the feed at night. She reports occasional sensation of fullness, but denies nausea, vomiting, abdominal pain. She still takes Creon regularly. She has noticed some mild tenderness and minimal discharge near feeding tube, and wonder if this is a problem. She states she cleans it with alcohol and/or hydrogen peroxide.    Past Medical History:   Diagnosis Date    Acute recurrent pancreatitis 08/2021    Anesthesia     PONV    Arthritis  08/2021    fingers    Celiac disease     Disorder of thyroid     hypothyroid    Gastroparesis 08/2021    Gluten intolerance     Pain stomach issues     Past Surgical History:   Procedure Laterality Date    TX UPPER GI ENDOSCOPY,DIAGNOSIS N/A 2/2/2024    Procedure: GASTROSCOPY WITH J TUBE REVISION;  Surgeon: Davin Garces M.D.;  Location: SURGERY SAME DAY [facility];  Service: Gastroenterology    TX UPPER GI ENDOSCOPY,DIAGNOSIS N/A 2/2/2024    Procedure: GASTROSCOPY;  Surgeon: Davin Garces M.D.;  Location: SURGERY SAME DAY [facility];  Service: Gastroenterology    TX UPPER GI ENDOSCOPY,CTRL BLEED N/A 2/2/2024    Procedure: EGD, WITH CLIP PLACEMENT;  Surgeon: Davin Garces M.D.;  Location: SURGERY SAME DAY [facility];  Service: Gastroenterology    TX UPPER GI ENDOSCOPY,DIAGNOSIS N/A 1/30/2024    Procedure: GASTROSCOPY;  Surgeon: Claudia Oakes M.D.;  Location: SURGERY SAME DAY [facility];  Service: Gastroenterology    GASTROSTOMY N/A 1/30/2024    Procedure: PERCUTANEOUS ENDOSCOPIC GASTROSTOMY WITH TUBE PLACEMENT, JEJUNOSTOMY TUBE;  Surgeon: Claudia Oakes M.D.;  Location: SURGERY SAME DAY [facility];  Service: Gastroenterology    GASTROJEJUNOSTOMY N/A 05/05/2023    Procedure: MAIK-EN-Y GASTROJEJUNOSTOMY BYPASS AND EXPLORATORY LAPAORTOMY;  Surgeon: Bradly Cisneros M.D.;  Location: [facility];  Service: General    TX UPPER GI ENDOSCOPY,DIAGNOSIS  04/18/2022    Procedure: GASTROSCOPY - WITH PYLORUS DILATION WITH BOTOX INJECTION;  Surgeon: Rivas Cadet M.D.;  Location: [facility];  Service: Gastroenterology    TX ERCP,DIAGNOSTIC  04/18/2022    Procedure: ERCP (ENDOSCOPIC RETROGRADE CHOLANGIOPANCREATOGRAPHY);  Surgeon: Rivas Cadet M.D.;  Location: [facility];  Service: Gastroenterology    TX UPPER GI ENDOSCOPY,DIAGNOSIS N/A 08/09/2021    Procedure: GASTROSCOPY;  Surgeon: Rivas Cadet M.D.;  Location: [facility];  Service: EUS    TX  "ERCP,DIAGNOSTIC N/A 08/09/2021    Procedure: ERCP, DIAGNOSTIC;  Surgeon: Rivas Cadet M.D.;  Location: SURGERY [facility];  Service: EUS    EGD W/ENDOSCOPIC ULTRASOUND N/A 08/09/2021    Procedure: EGD, WITH ENDOSCOPIC US - UPPER RADIAL;  Surgeon: Rivas Cadet M.D.;  Location: SURGERY [facility];  Service: EUS    ORIF, HUMERUS Left 2006    KNEE ARTHROSCOPY Left 1973    CHOLECYSTECTOMY  1963    APPENDECTOMY CHILD      OTHER  5-21-23    PRIMARY C SECTION  1984, 1987, 1990     Allergies   Allergen Reactions    Hydrocodone-Acetaminophen Rash     Rash, \"I can tolerate in small amounts\"      Loratadine-Pseudoephedrine Rash     rash    Sertraline Hcl Palpitations    Tramadol Vomiting     Fainting    Zoloft Rash     rash    Codeine Vomiting    Gluten Meal      \"violent stomach upset for 4-5 days\"     Outpatient Encounter Medications as of 3/26/2024   Medication Sig Dispense Refill    oxyCODONE CR (OXYCONTIN) 40 MG Tablet Extended Release 12 hour Abuse-Deterrent tablet Take 40 mg by mouth every 12 hours as needed.      Pancrelipase, Lip-Prot-Amyl, 88809-603726 units Cap DR Particles Take 2 Capsules by mouth 3 times a day with meals. 250 Capsule 11    Pancrelipase, Lip-Prot-Amyl, (CREON) 14608-049111 units Cap DR Particles Take 2 Capsules by mouth 3 times a day. Take 2 capsules per each meal, and 1 capsule per snack 250 Capsule 11    CALCIUM PO Take 1 Tablet by mouth every day.      multivitamin Tab Take 1 Tablet by mouth every day.      ondansetron (ZOFRAN) 4 MG Tab tablet Take 4 mg by mouth every 8 hours as needed for Nausea/Vomiting.      levothyroxine (SYNTHROID) 50 MCG Tab Take 50 mcg by mouth every morning on an empty stomach.       No facility-administered encounter medications on file as of 3/26/2024.     Social History     Socioeconomic History    Marital status:      Spouse name: Not on file    Number of children: Not on file    Years of education: Not on file    Highest education " "level: Not on file   Occupational History    Not on file   Tobacco Use    Smoking status: Never     Passive exposure: Never    Smokeless tobacco: Never   Vaping Use    Vaping Use: Never used   Substance and Sexual Activity    Alcohol use: Never    Drug use: Not Currently     Types: Oral     Comment: cbd/thc, daily for pain - last took 5/3/2023    Sexual activity: Not on file   Other Topics Concern    Not on file   Social History Narrative    Not on file     Social Determinants of Health     Financial Resource Strain: Not on file   Food Insecurity: Not on file   Transportation Needs: Not on file   Physical Activity: Not on file   Stress: Not on file   Social Connections: Not on file   Intimate Partner Violence: Not on file   Housing Stability: Not on file      Social History     Tobacco Use   Smoking Status Never    Passive exposure: Never   Smokeless Tobacco Never     Social History     Substance and Sexual Activity   Alcohol Use Never     Social History     Substance and Sexual Activity   Drug Use Not Currently    Types: Oral    Comment: cbd/thc, daily for pain - last took 5/3/2023      No family history on file.    Review of Systems   Constitutional:  Negative for chills, fever, malaise/fatigue and weight loss.   Respiratory:  Negative for cough.    Cardiovascular:  Negative for chest pain.   Gastrointestinal:  Negative for abdominal pain, constipation, diarrhea, nausea and vomiting.        Objective:   /62 (BP Location: Left arm, Patient Position: Sitting, BP Cuff Size: Child)   Pulse (!) 54   Temp 37 °C (98.6 °F) (Temporal)   Ht 1.575 m (5' 2\")   Wt 28.6 kg (63 lb)   SpO2 96%   BMI 11.52 kg/m²     Physical Exam  Vitals reviewed.   Constitutional:       General: She is not in acute distress.     Appearance: She is cachectic.   HENT:      Head: Normocephalic and atraumatic.      Nose: Nose normal.      Mouth/Throat:      Pharynx: Oropharynx is clear.   Cardiovascular:      Rate and Rhythm: Bradycardia " present.   Pulmonary:      Effort: Pulmonary effort is normal. No respiratory distress.   Abdominal:      General: There is no distension.      Palpations: Abdomen is soft.      Tenderness: There is no abdominal tenderness.      Comments: Feeding tube at [facility]. Skin surrounding tube is normal, non-tender, no discharge noted, no bruising, no swelling   Musculoskeletal:         General: Normal range of motion.   Skin:     General: Skin is warm and dry.   Neurological:      Mental Status: She is alert and oriented to person, place, and time.   Psychiatric:         Mood and Affect: Mood normal.         Behavior: Behavior normal.         Labs   Latest Reference Range & Units 02/02/24 05:03   WBC 4.8 - 10.8 K/uL 3.9 (L)   RBC 4.20 - 5.40 M/uL 3.30 (L)   Hemoglobin 12.0 - 16.0 g/dL 11.0 (L)   Hematocrit 37.0 - 47.0 % 32.3 (L)   MCV 81.4 - 97.8 fL 97.9 (H)   MCH 27.0 - 33.0 pg 33.3 (H)   MCHC 32.2 - 35.5 g/dL 34.1   RDW 35.9 - 50.0 fL 44.1   Platelet Count 164 - 446 K/uL 188   MPV 9.0 - 12.9 fL 9.7   (L): Data is abnormally low  (H): Data is abnormally high      Latest Reference Range & Units 02/07/24 13:40   Sodium 135 - 145 mmol/L 138   Potassium 3.6 - 5.5 mmol/L 3.8   Chloride 96 - 112 mmol/L 102   Co2 20 - 33 mmol/L 26   Anion Gap 7.0 - 16.0  10.0   Glucose 65 - 99 mg/dL 79   Bun 8 - 22 mg/dL 10   Creatinine 0.50 - 1.40 mg/dL 0.35 (L)   GFR (CKD-EPI) >60 mL/min/1.73 m 2 111   Calcium 8.5 - 10.5 mg/dL 9.2   Correct Calcium 8.5 - 10.5 mg/dL 9.5   AST(SGOT) 12 - 45 U/L 53 (H)   ALT(SGPT) 2 - 50 U/L 52 (H)   Alkaline Phosphatase 30 - 99 U/L 155 (H)   Total Bilirubin 0.1 - 1.5 mg/dL 0.4   Albumin 3.2 - 4.9 g/dL 3.6   Total Protein 6.0 - 8.2 g/dL 6.0   Globulin 1.9 - 3.5 g/dL 2.4   A-G Ratio g/dL 1.5   Phosphorus 2.5 - 4.5 mg/dL 2.9   Magnesium 1.5 - 2.5 mg/dL 1.8   (L): Data is abnormally low  (H): Data is abnormally high     Imaging  SMALL BOWEL FOLLOW THROUGH (9/13/23)  IMPRESSION:     1.  Surgical change consistent with  prior side-to-side anastomosis with a loop of jejunum and the gastric antrum. There is no evidence of stricture or delay in passage of contrast across the anastomosis.     2.  Normal small bowel follow-through with contrast reaching the colon within 90 minutes.     CT ABDOMEN (7/10/23)  IMPRESSION:  1.  Changes of gastric bypass  2.  Prior cholecystectomy with pneumobilia as before  3.  Small BILATERAL pleural effusions, new since recent outside study  4.  Ascites, increased since the recent outside study  5.  RIGHT upper and LEFT lower quadrant abdominal wall gas, possibly related to medication administration or other intervention  6.  BILATERAL L5 pars defects with grade 1 anterolisthesis of L5 on S1     CT ABDOMEN (5/3/23)  IMPRESSION:  1.  Prior cholecystectomy.  2.  Pneumobilia.  3.  No significant pancreatic abnormality.  4.  Abdominal aortic atherosclerosis without aneurysm.     NM GASTRIC EMPTYING STUDY (4/27/23)  IMPRESSION:  Gastric emptying halftime measured at 204 minutes consistent with delayed gastric emptying. This previously measured approximately 223.5 minutes        NM GASTRIC EMPTYING STUDY (7/20/21)  FINDINGS:     After observation the activity remaining in the stomach is as follows:     Immediately:  100%     1 hour:  68%     2 hours:   59%     4 hours:   47%     IMPRESSION:     Significant retained gastric activity at 4 hours indicating delayed gastric emptying.     Pathology  N/A     Procedures  SURGERY (5/5/23)  PROCEDURE:  Sinai-en-Y gastrojejunostomy.     PROCEDURE (4/18/23)  GASTROSCOPY - WITH PYLORUS DILATION WITH BOTOX INJECTION - Wound Class: Clean Contaminated  ERCP (ENDOSCOPIC RETROGRADE CHOLANGIOPANCREATOGRAPHY) - Wound Class: Clean Contaminated     Findings:              EGD                          Esophagus                                      Unremarkable mucosa                          Stomach                                      Unremarkable mucosa                                       Narrow caliber pylorus                          Duodenum                                      Unremarkable mucosa                 ERCP                          Ampulla                                      Post sphincterotomy anatomy                          Pancreatic duct                                      Neither injected nor cannulated                          CBD                                      Normal course and caliber                 Therapeutics                          Intersphincteric injection of Botox - 100U divided 4 quadrants                          Hydraulic dilation of pylorus 18-19-20 mm balloon                          10-11-12 mm hydraulic dilation of biliary ampulla                          Balloon sweeps negative for stones or debris     PROCEDURE (8/9/21)  1.  Esophagogastroduodenoscopy with hydraulic dilation of gastric outlet.  2.  Endoscopic ultrasound, upper.  3.  Endoscopic retrograde cholangiopancreatography with biliary   sphincterotomy, bile duct balloon soft stone debris extraction, radiologic   interpretation of static and dynamic fluoroscopic images by myself, at no time   was a radiologist present in the room.     EGD FINDINGS:  1.  Esophagus:  Unremarkable mucosa.  2.  Long and J-shaped stomach, otherwise unremarkable mucosa.  3.  Mildly stenotic pylorus, successfully dilated to 20 mm with balloon.  4.  Duodenum:  Unremarkable mucosa.  Possible extrinsic compression of the   second portion.     ENDOSCOPIC ULTRASOUND FINDINGS:  1.  Celiac axis, no adenopathy.  2.  Pancreatic body and tail, normal parenchyma.  3.  Pancreatic duct, normal course and caliber.  4.  Gallbladder, surgically absent.  5.  Biliary ampulla, unremarkable.  6.  Common bile duct, generous caliber down to the level of the ampulla.    Normal course.  Small subtle nonshadowing filling defect suspicious of stone   versus sludge ball.  7.  Head of pancreas, normal parenchyma and normal dorsal ventral  transition.     ERCP FINDINGS:  1.  Biliary ampulla, small, but otherwise unremarkable appearance.  Stenotic   to wire and cannula passage.  2.  Pancreatic duct, neither injected nor cannulated.  3.  Common bile duct, normal course.  Generous caliber.  Small distal filling   defect consistent with small stone.    Diagnosis:     1. Chronic pancreatitis, unspecified pancreatitis type (HCC)        2. Anorexia nervosa, restricting type        3. Underweight due to inadequate caloric intake        4. History of Sinai-en-Y gastric bypass        5. Pancreatic insufficiency        6. Malnutrition compromising bodily function (HCC)        7. Delayed gastric emptying        8. Encounter for care related to feeding tube            Medical Decision Making:  Today's Assessment / Status / Plan:     Overall, Kathy Dumont appears to be recovering well from placement of recent PEG tube with extension to jejunum. The patient is tolerating nightly tube feeds, and reports she has gained a few pounds. Order for CMP lab test sent to be completed in 1-2 months at patient convenience.    The feeding tube site was assessed, and appears to be in good order. Patient advised to clean gently with soap and water, avoid alcohol or peroxide application.    Patient expressed concern with poor responsiveness by current GI provider, and said she is interested to see if other provider could see her. Referral to Dr Radha duarte I, Kevyn Newby NP, have entered, reviewed and confirmed the above diagnoses related to this patient on this date of service, as per the time and date noted at top of this note.

## 2024-03-26 ENCOUNTER — OFFICE VISIT (OUTPATIENT)
Dept: SURGICAL ONCOLOGY | Facility: MEDICAL CENTER | Age: 69
End: 2024-03-26
Payer: MEDICARE

## 2024-03-26 VITALS
HEART RATE: 54 BPM | HEIGHT: 62 IN | BODY MASS INDEX: 11.59 KG/M2 | OXYGEN SATURATION: 96 % | WEIGHT: 63 LBS | DIASTOLIC BLOOD PRESSURE: 62 MMHG | TEMPERATURE: 98.6 F | SYSTOLIC BLOOD PRESSURE: 108 MMHG

## 2024-03-26 DIAGNOSIS — Z46.59 ENCOUNTER FOR CARE RELATED TO FEEDING TUBE: ICD-10-CM

## 2024-03-26 DIAGNOSIS — K30 DELAYED GASTRIC EMPTYING: ICD-10-CM

## 2024-03-26 DIAGNOSIS — R63.6 UNDERWEIGHT DUE TO INADEQUATE CALORIC INTAKE: ICD-10-CM

## 2024-03-26 DIAGNOSIS — K86.1 CHRONIC PANCREATITIS, UNSPECIFIED PANCREATITIS TYPE (HCC): ICD-10-CM

## 2024-03-26 DIAGNOSIS — K86.89 PANCREATIC INSUFFICIENCY: ICD-10-CM

## 2024-03-26 DIAGNOSIS — F50.01 ANOREXIA NERVOSA, RESTRICTING TYPE: ICD-10-CM

## 2024-03-26 DIAGNOSIS — Z98.84 HISTORY OF ROUX-EN-Y GASTRIC BYPASS: ICD-10-CM

## 2024-03-26 DIAGNOSIS — E46 MALNUTRITION COMPROMISING BODILY FUNCTION (HCC): ICD-10-CM

## 2024-03-26 PROCEDURE — 3078F DIAST BP <80 MM HG: CPT | Performed by: NURSE PRACTITIONER

## 2024-03-26 PROCEDURE — 3074F SYST BP LT 130 MM HG: CPT | Performed by: NURSE PRACTITIONER

## 2024-03-26 PROCEDURE — 99213 OFFICE O/P EST LOW 20 MIN: CPT | Performed by: NURSE PRACTITIONER

## 2024-03-26 ASSESSMENT — FIBROSIS 4 INDEX: FIB4 SCORE: 2.66

## 2024-03-26 ASSESSMENT — ENCOUNTER SYMPTOMS
FEVER: 0
NAUSEA: 0
CHILLS: 0
VOMITING: 0
ABDOMINAL PAIN: 0
CONSTIPATION: 0
WEIGHT LOSS: 0
COUGH: 0
DIARRHEA: 0

## 2024-04-10 ENCOUNTER — NON-PROVIDER VISIT (OUTPATIENT)
Dept: INTERNAL MEDICINE | Facility: OTHER | Age: 69
End: 2024-04-10
Payer: MEDICARE

## 2024-04-10 VITALS — BODY MASS INDEX: 12.11 KG/M2 | WEIGHT: 66.2 LBS

## 2024-04-10 DIAGNOSIS — R63.0 ANOREXIA: ICD-10-CM

## 2024-04-10 DIAGNOSIS — E43 SEVERE PROTEIN-CALORIE MALNUTRITION (HCC): ICD-10-CM

## 2024-04-10 DIAGNOSIS — R63.6 UNDERWEIGHT DUE TO INADEQUATE CALORIC INTAKE: ICD-10-CM

## 2024-04-10 DIAGNOSIS — Z98.84 HISTORY OF ROUX-EN-Y GASTRIC BYPASS: ICD-10-CM

## 2024-04-10 PROCEDURE — 97803 MED NUTRITION INDIV SUBSEQ: CPT | Performed by: DIETITIAN, REGISTERED

## 2024-04-10 ASSESSMENT — FIBROSIS 4 INDEX: FIB4 SCORE: 2.66

## 2024-04-10 NOTE — PATIENT INSTRUCTIONS
Goals:  Let's increase your calories at lunch. Do a full hard boiled egg and add in 1 tbsp of peanut butter or nutella. Add in Creon at this meal  Keep you at the 75 mL per night of the Nutren 1.5   In 2 weeks, go up to 82 mL per night so you get in 3.5 cans per night

## 2024-04-10 NOTE — PROGRESS NOTES
Kathy is here for FU with RD for support with being underweight with a BMI of 12.11 which is below the recommended healthy range of 18.5-24.9, anorexia, severe protein-calorie malnutrition and history of Sinai-en-Y gastric bypass surgery     She reports that she fell on Friday at home and hurt her wrist so trying to recover from that but did have a bit of a delay in starting her feeds when she was in the ER    She reports overall, however, things are going well. She is doing 3 can sof Nutren 1.5 per night which is = 1,125 calories per day on top of her food intake orally but that is not increasing yet    Still struggles at the 3--4 day after her rate goes up and then she starts to feel better  Gets nausea and pain and then it goes away    Taking another Creon dose at 1am which seems to help     Her oral meals are still not small and about the same as before     Does better at breakfast   As the day goes on she feels more full    Hoping warmer weather and have more gardneing that allows more activity that may help her appetite     Dinner time she is really not hungry but still eating what she can   She did swtich out the pudding vs the jello or she'll have fruit with crackers/peanut butter or crackers and cheese    75 mL per hour at night currently and wants to stay there   Not ready to increase the rate yet for another week     Current weight today is 66 pounds which is up 3 pounds from prior visit.  This puts her BMI today at 12.11    Overall Kathy continues to make good progress with her rate of tube feeds at night and we are seeing weight gain happen. However, she is not able to increase her oral feeds yet so we really talked about that today with the goal of addign in 150 calories per day with suggestions on how to do so.  Will plan to have her increase to 82 mL per night (3.5 cans/night) in 2 weeks as she adjust to more oral feeds.  We will need new order for her Nutren 1.5 to go up to  mL per night so  that we can continue to increase her foods.    RTC with RD in 3 weeks.     Time spent: 45 minutes

## 2024-05-01 ENCOUNTER — DOCUMENTATION (OUTPATIENT)
Dept: INTERNAL MEDICINE | Facility: OTHER | Age: 69
End: 2024-05-01
Payer: MEDICARE

## 2024-05-01 NOTE — PROGRESS NOTES
Received message from Kathy regarding swelling in her feet and stomach and concern over that. Messaged her back to call her PCP but they have had turnover.   I do recommend that we get her potassium, phos and mag checked as it has been a while and we are slowly increasing her tube feeds with currently at 3 bottles/day which is 1,125 calories and 132 grams of carbohydrates.  They tried to go above that but she has been struggling.    At 3:00pm today finally got in contact with Sharee at [facility] ([phone]) regarding the need for Kathy to be evaluated with her electrolytes  She mentioned that her old PCP Zoey is no longer with them and she will see her new PCP in 2 weeks and they can order labs then  I strongly suggested that we cannot wait 2 weeks and that soemone needs to give my message to the new PCP and I would be happy to discuss over the phone that we get her labs done ASAP.   I let Kathy know and that if she is having any heart palpitations, dizziness, or any other abnormal things to talk to her doctor/go to ER.  Waiting for a call back from PCP or note that they are getting labs done.

## 2024-05-13 ENCOUNTER — NON-PROVIDER VISIT (OUTPATIENT)
Dept: INTERNAL MEDICINE | Facility: OTHER | Age: 69
End: 2024-05-13
Payer: MEDICARE

## 2024-05-13 VITALS — BODY MASS INDEX: 12.55 KG/M2 | WEIGHT: 68.6 LBS

## 2024-05-13 DIAGNOSIS — E43 SEVERE PROTEIN-CALORIE MALNUTRITION (HCC): ICD-10-CM

## 2024-05-13 DIAGNOSIS — R63.6 UNDERWEIGHT DUE TO INADEQUATE CALORIC INTAKE: ICD-10-CM

## 2024-05-13 DIAGNOSIS — K30 DELAYED GASTRIC EMPTYING: ICD-10-CM

## 2024-05-13 DIAGNOSIS — Z98.84 HISTORY OF ROUX-EN-Y GASTRIC BYPASS: ICD-10-CM

## 2024-05-13 PROCEDURE — 97803 MED NUTRITION INDIV SUBSEQ: CPT | Performed by: DIETITIAN, REGISTERED

## 2024-05-13 ASSESSMENT — FIBROSIS 4 INDEX: FIB4 SCORE: 2.66

## 2024-05-13 NOTE — PROGRESS NOTES
Katyh is here for FU with RD for support with protein calorie malnutrition, being underweight with a BMI less than 19 and history of anorexia, severe protein-calorie malnutrition and history of Sinai-en-Y gastric bypass surgery along with gastroparesis     She is here with her supportive  Prasda as well     She reports that she did get her labs done but not in her chart - I have tried reaching to her doctor and still have not gotten results yet  Today we had our MA staff call the lab and was told there is no result in their system   Will call office to see and patient plans to stop by there again tomorrow    Having some heart palpitations and some swelling but the swelling and the bloating is better - able to get shoes on now but before it was hard to do so     Has had trouble increasing her rate of TF to higher than 75 mL/hr  When the symptoms were the worst she was at 86 mL/hr but didn't really like that   And moved now to 3 bottles per night and did that for 3 days and then went back to 3.5 bottles and went to 75 mL/hour   Feels like the 86 mL/hr was too much with feeling really sick - struggling with eating dinner because she goes on the feeds soon after   Breakfast and lunch she feels good   Can get nauseated and bloating and feels it gest trapped and can't burp     Currently doing 6pm start of feeds of 75ml/hr of Peptamen 1.5 and getting in 3.5 bottles per day = 1312 calories per day   Runs out around 4am   Pushing fluids     Notching she can get in her car easier and up stairs  No longer needs help   Has been doing more gardening/yard work     Lunch is the largest meal  Pudding, fruits or crackers with peanut butter  Dinner is prune juice with grapefruit     BMs are going well - every other day   Taking her Creon 36 mg at dinner and the 1am another 6000mg of Creon     Overall we are seeing continued weight gain each visit with Kathy although a smaller weight gain this appointment as she has struggled with  some hard days as they attempted to get her above 75 mL/hr. Currently we will plan to keep her at a max rate of 75mL/hr and they have extended the time on the tube feed pump so she is getting in more calories with 3.5 cans/night just over longer time period  She struggles with wanting to eat dinner so plan is to push dinner earlier in the evening so she has more time to settle before she starts her pump feeds  She has not made much progress with her oral diet so we talked about that today with specific plans to add in 200 calories per day and will work on that right now  It is vital we get her lab results to ensure she is not in any refeeding situation and maintain her Peptamen 1.5 of 75 mL/ hour   Set goals; rtc with RD in 4 weeks    Time spent: 60 minutes

## 2024-05-13 NOTE — PATIENT INSTRUCTIONS
Goals:  Move dinner to 4:30pm to see if that works better  Increase your portions of your fish sticks at dinner to 1.5 sticks vs 1 stick  Go to [employer] to request copy of your labs and have them fax to the new fax number you now have   Continue Peptamen 1.5 of 75mL/hour to get in 3.5 cans per night

## 2024-06-13 ENCOUNTER — NON-PROVIDER VISIT (OUTPATIENT)
Dept: INTERNAL MEDICINE | Facility: OTHER | Age: 69
End: 2024-06-13
Payer: MEDICARE

## 2024-06-13 VITALS — WEIGHT: 72.2 LBS | BODY MASS INDEX: 13.21 KG/M2

## 2024-06-13 DIAGNOSIS — K30 DELAYED GASTRIC EMPTYING: ICD-10-CM

## 2024-06-13 DIAGNOSIS — Z98.84 S/P GASTRIC BYPASS: ICD-10-CM

## 2024-06-13 DIAGNOSIS — E43 SEVERE PROTEIN-CALORIE MALNUTRITION (HCC): ICD-10-CM

## 2024-06-13 DIAGNOSIS — R63.6 PATIENT UNDERWEIGHT: ICD-10-CM

## 2024-06-13 PROCEDURE — 97803 MED NUTRITION INDIV SUBSEQ: CPT | Performed by: DIETITIAN, REGISTERED

## 2024-06-13 ASSESSMENT — FIBROSIS 4 INDEX: FIB4 SCORE: 2.66

## 2024-06-13 NOTE — PATIENT INSTRUCTIONS
Goals:  Add in Creon of 10,000 units at breakfast and lunch to see if that helps you  Add in 1 tbsp of peanut butter at breakfast   Add in 1 hard boiled egg at lunch   You don't have to earn food!

## 2024-06-13 NOTE — PROGRESS NOTES
Kathy is here for FU with RD for support with protein calorie malnutrition, being underweight with a BMI less than 19 and history of anorexia, severe protein-calorie malnutrition and history of Sinai-en-Y gastric bypass surgery along with gastroparesis     Having a BM every other day and feeling good about that  Some increased nausea and usually after every meal   Last night was worst with after dinner with pain for 2 hours and nausea - finally felt better after having a bowel movement    Doing more water flushes - morning, afternoon, before feeding at night   Does a full syringe - about  cc of water   This is more than normal   Having to pee more at night and during the day   Super hot at night - sweating quite a bit   Has achy at night     Has more energy and doing more gardening     Finds that her CBD helps her eat more   Wants to start taking 30min-1 hour after to help     Finds she can eat breakfat and lunch but dinner is hard     Treid dinner earlier but didn't help  Also did not go up to her incresaed portions that we talked about       Tube Feeds at 3.5 cans per night     Still doing 1 tsp of MCT with her coffee     Kathy is consuming 1312 calories per day with her 75 mL/hr of her Peptamen 1.5 tube feed formula which is about 3.5 bottles per night run with her J-Tube with pump while she sleeps   They are adding in fluids 3 times per day to help her stay hydrated  Oral consumption is still less than 300 calories per day and she admitted to a lot of hesitation with old food rules she carried with her for 20 years with her anorexia history. We had a good conversation today around working through those food rules because right now we need to push her oral feeds to help with her tube feeds to continue to get better  She has gained almost 20# she she has started her tube feeds but continued weight gain will need to see an increase in her oral meals.   Set goals; rtc with RD in 1 month    Time spent: 45  minutes

## 2024-07-12 ENCOUNTER — NON-PROVIDER VISIT (OUTPATIENT)
Dept: INTERNAL MEDICINE | Facility: OTHER | Age: 69
End: 2024-07-12
Payer: MEDICARE

## 2024-07-12 VITALS — WEIGHT: 76 LBS | BODY MASS INDEX: 13.9 KG/M2

## 2024-07-12 DIAGNOSIS — E43 SEVERE PROTEIN-ENERGY MALNUTRITION (HCC): ICD-10-CM

## 2024-07-12 DIAGNOSIS — Z98.84 S/P BARIATRIC SURGERY: ICD-10-CM

## 2024-07-12 DIAGNOSIS — R63.6 UNDERWEIGHT: ICD-10-CM

## 2024-07-12 DIAGNOSIS — K30 DELAYED GASTRIC EMPTYING: ICD-10-CM

## 2024-07-12 PROCEDURE — 97803 MED NUTRITION INDIV SUBSEQ: CPT | Performed by: DIETITIAN, REGISTERED

## 2024-07-12 ASSESSMENT — FIBROSIS 4 INDEX: FIB4 SCORE: 2.66

## 2024-08-12 ENCOUNTER — HOSPITAL ENCOUNTER (OUTPATIENT)
Dept: RADIOLOGY | Facility: MEDICAL CENTER | Age: 69
End: 2024-08-12

## 2024-08-12 ENCOUNTER — HOSPITAL ENCOUNTER (INPATIENT)
Facility: MEDICAL CENTER | Age: 69
LOS: 2 days | DRG: 542 | End: 2024-08-14
Attending: EMERGENCY MEDICINE | Admitting: STUDENT IN AN ORGANIZED HEALTH CARE EDUCATION/TRAINING PROGRAM
Payer: MEDICARE

## 2024-08-12 DIAGNOSIS — K86.1 CHRONIC PANCREATITIS, UNSPECIFIED PANCREATITIS TYPE (HCC): ICD-10-CM

## 2024-08-12 DIAGNOSIS — S22.000A COMPRESSION FRACTURE OF BODY OF THORACIC VERTEBRA (HCC): ICD-10-CM

## 2024-08-12 DIAGNOSIS — S32.010A CLOSED COMPRESSION FRACTURE OF L1 LUMBAR VERTEBRA, INITIAL ENCOUNTER (HCC): ICD-10-CM

## 2024-08-12 DIAGNOSIS — K86.89 PANCREATIC INSUFFICIENCY: ICD-10-CM

## 2024-08-12 DIAGNOSIS — M54.59 INTRACTABLE LOW BACK PAIN: ICD-10-CM

## 2024-08-12 DIAGNOSIS — M54.9 INTRACTABLE BACK PAIN: ICD-10-CM

## 2024-08-12 DIAGNOSIS — S22.000A CLOSED COMPRESSION FRACTURE OF THORACIC VERTEBRA, INITIAL ENCOUNTER (HCC): ICD-10-CM

## 2024-08-12 LAB
ALBUMIN SERPL BCP-MCNC: 3.5 G/DL (ref 3.2–4.9)
ALBUMIN/GLOB SERPL: 1.3 G/DL
ALP SERPL-CCNC: 237 U/L (ref 30–99)
ALT SERPL-CCNC: 24 U/L (ref 2–50)
ANION GAP SERPL CALC-SCNC: 11 MMOL/L (ref 7–16)
AST SERPL-CCNC: 27 U/L (ref 12–45)
BASOPHILS # BLD AUTO: 0.4 % (ref 0–1.8)
BASOPHILS # BLD: 0.03 K/UL (ref 0–0.12)
BILIRUB SERPL-MCNC: 0.3 MG/DL (ref 0.1–1.5)
BUN SERPL-MCNC: 21 MG/DL (ref 8–22)
CALCIUM ALBUM COR SERPL-MCNC: 8.9 MG/DL (ref 8.5–10.5)
CALCIUM SERPL-MCNC: 8.5 MG/DL (ref 8.5–10.5)
CHLORIDE SERPL-SCNC: 98 MMOL/L (ref 96–112)
CO2 SERPL-SCNC: 25 MMOL/L (ref 20–33)
CREAT SERPL-MCNC: 0.45 MG/DL (ref 0.5–1.4)
EOSINOPHIL # BLD AUTO: 0.07 K/UL (ref 0–0.51)
EOSINOPHIL NFR BLD: 0.9 % (ref 0–6.9)
ERYTHROCYTE [DISTWIDTH] IN BLOOD BY AUTOMATED COUNT: 49 FL (ref 35.9–50)
GFR SERPLBLD CREATININE-BSD FMLA CKD-EPI: 104 ML/MIN/1.73 M 2
GLOBULIN SER CALC-MCNC: 2.7 G/DL (ref 1.9–3.5)
GLUCOSE SERPL-MCNC: 97 MG/DL (ref 65–99)
HCT VFR BLD AUTO: 31.9 % (ref 37–47)
HGB BLD-MCNC: 10.3 G/DL (ref 12–16)
IMM GRANULOCYTES # BLD AUTO: 0.03 K/UL (ref 0–0.11)
IMM GRANULOCYTES NFR BLD AUTO: 0.4 % (ref 0–0.9)
LYMPHOCYTES # BLD AUTO: 1.71 K/UL (ref 1–4.8)
LYMPHOCYTES NFR BLD: 21.1 % (ref 22–41)
MCH RBC QN AUTO: 26.4 PG (ref 27–33)
MCHC RBC AUTO-ENTMCNC: 32.3 G/DL (ref 32.2–35.5)
MCV RBC AUTO: 81.8 FL (ref 81.4–97.8)
MONOCYTES # BLD AUTO: 0.55 K/UL (ref 0–0.85)
MONOCYTES NFR BLD AUTO: 6.8 % (ref 0–13.4)
NEUTROPHILS # BLD AUTO: 5.71 K/UL (ref 1.82–7.42)
NEUTROPHILS NFR BLD: 70.4 % (ref 44–72)
NRBC # BLD AUTO: 0 K/UL
NRBC BLD-RTO: 0 /100 WBC (ref 0–0.2)
PLATELET # BLD AUTO: 422 K/UL (ref 164–446)
PMV BLD AUTO: 9 FL (ref 9–12.9)
POTASSIUM SERPL-SCNC: 4.1 MMOL/L (ref 3.6–5.5)
PROT SERPL-MCNC: 6.2 G/DL (ref 6–8.2)
RBC # BLD AUTO: 3.9 M/UL (ref 4.2–5.4)
SODIUM SERPL-SCNC: 134 MMOL/L (ref 135–145)
WBC # BLD AUTO: 8.1 K/UL (ref 4.8–10.8)

## 2024-08-12 PROCEDURE — 770006 HCHG ROOM/CARE - MED/SURG/GYN SEMI*

## 2024-08-12 PROCEDURE — 99285 EMERGENCY DEPT VISIT HI MDM: CPT

## 2024-08-12 PROCEDURE — 80053 COMPREHEN METABOLIC PANEL: CPT

## 2024-08-12 PROCEDURE — 36415 COLL VENOUS BLD VENIPUNCTURE: CPT

## 2024-08-12 PROCEDURE — 700111 HCHG RX REV CODE 636 W/ 250 OVERRIDE (IP): Mod: JZ | Performed by: EMERGENCY MEDICINE

## 2024-08-12 PROCEDURE — 96374 THER/PROPH/DIAG INJ IV PUSH: CPT

## 2024-08-12 PROCEDURE — 99223 1ST HOSP IP/OBS HIGH 75: CPT | Performed by: STUDENT IN AN ORGANIZED HEALTH CARE EDUCATION/TRAINING PROGRAM

## 2024-08-12 PROCEDURE — 85025 COMPLETE CBC W/AUTO DIFF WBC: CPT

## 2024-08-12 PROCEDURE — 96375 TX/PRO/DX INJ NEW DRUG ADDON: CPT

## 2024-08-12 RX ORDER — ONDANSETRON 2 MG/ML
4 INJECTION INTRAMUSCULAR; INTRAVENOUS ONCE
Status: COMPLETED | OUTPATIENT
Start: 2024-08-12 | End: 2024-08-12

## 2024-08-12 RX ORDER — ONDANSETRON 4 MG/1
4 TABLET, ORALLY DISINTEGRATING ORAL EVERY 6 HOURS PRN
COMMUNITY

## 2024-08-12 RX ORDER — HEPARIN SODIUM 5000 [USP'U]/ML
5000 INJECTION, SOLUTION INTRAVENOUS; SUBCUTANEOUS EVERY 8 HOURS
Status: DISCONTINUED | OUTPATIENT
Start: 2024-08-13 | End: 2024-08-14 | Stop reason: HOSPADM

## 2024-08-12 RX ORDER — ONDANSETRON 4 MG/1
4 TABLET, ORALLY DISINTEGRATING ORAL EVERY 4 HOURS PRN
Status: DISCONTINUED | OUTPATIENT
Start: 2024-08-12 | End: 2024-08-13

## 2024-08-12 RX ORDER — MORPHINE SULFATE 4 MG/ML
2 INJECTION INTRAVENOUS
Status: DISCONTINUED | OUTPATIENT
Start: 2024-08-12 | End: 2024-08-13

## 2024-08-12 RX ORDER — ONDANSETRON 2 MG/ML
4 INJECTION INTRAMUSCULAR; INTRAVENOUS EVERY 4 HOURS PRN
Status: DISCONTINUED | OUTPATIENT
Start: 2024-08-12 | End: 2024-08-14 | Stop reason: HOSPADM

## 2024-08-12 RX ORDER — ENOXAPARIN SODIUM 100 MG/ML
40 INJECTION SUBCUTANEOUS DAILY
Status: DISCONTINUED | OUTPATIENT
Start: 2024-08-12 | End: 2024-08-12

## 2024-08-12 RX ORDER — OXYCODONE HYDROCHLORIDE 5 MG/1
2.5 TABLET ORAL
Status: DISCONTINUED | OUTPATIENT
Start: 2024-08-12 | End: 2024-08-13

## 2024-08-12 RX ORDER — LEVOTHYROXINE SODIUM 50 UG/1
50 TABLET ORAL
Status: DISCONTINUED | OUTPATIENT
Start: 2024-08-13 | End: 2024-08-12

## 2024-08-12 RX ORDER — MORPHINE SULFATE 4 MG/ML
4 INJECTION INTRAVENOUS ONCE
Status: COMPLETED | OUTPATIENT
Start: 2024-08-12 | End: 2024-08-12

## 2024-08-12 RX ORDER — OXYCODONE HYDROCHLORIDE 5 MG/1
5 TABLET ORAL
Status: DISCONTINUED | OUTPATIENT
Start: 2024-08-12 | End: 2024-08-13

## 2024-08-12 RX ADMIN — ONDANSETRON 4 MG: 2 INJECTION INTRAMUSCULAR; INTRAVENOUS at 19:35

## 2024-08-12 RX ADMIN — MORPHINE SULFATE 4 MG: 4 INJECTION, SOLUTION INTRAMUSCULAR; INTRAVENOUS at 19:36

## 2024-08-12 ASSESSMENT — ENCOUNTER SYMPTOMS
ABDOMINAL PAIN: 0
EYE PAIN: 0
INSOMNIA: 0
BACK PAIN: 1
DOUBLE VISION: 0
PHOTOPHOBIA: 0
DIARRHEA: 0
PALPITATIONS: 0
FEVER: 0
NECK PAIN: 0
HALLUCINATIONS: 0
DIZZINESS: 0
CHILLS: 0
NERVOUS/ANXIOUS: 0
SPUTUM PRODUCTION: 0
HEADACHES: 0
NAUSEA: 0
SINUS PAIN: 0
EYE DISCHARGE: 0
ORTHOPNEA: 0
HEMOPTYSIS: 0
FALLS: 0
VOMITING: 0
SHORTNESS OF BREATH: 0

## 2024-08-12 ASSESSMENT — LIFESTYLE VARIABLES
SUBSTANCE_ABUSE: 0
ALCOHOL_USE: NO
HAVE PEOPLE ANNOYED YOU BY CRITICIZING YOUR DRINKING: NO
EVER FELT BAD OR GUILTY ABOUT YOUR DRINKING: NO
ON A TYPICAL DAY WHEN YOU DRINK ALCOHOL HOW MANY DRINKS DO YOU HAVE: 0
HAVE YOU EVER FELT YOU SHOULD CUT DOWN ON YOUR DRINKING: NO
DOES PATIENT WANT TO STOP DRINKING: NO
TOTAL SCORE: 0
TOTAL SCORE: 0
HOW MANY TIMES IN THE PAST YEAR HAVE YOU HAD 5 OR MORE DRINKS IN A DAY: 0
TOTAL SCORE: 0
CONSUMPTION TOTAL: NEGATIVE
EVER HAD A DRINK FIRST THING IN THE MORNING TO STEADY YOUR NERVES TO GET RID OF A HANGOVER: NO
AVERAGE NUMBER OF DAYS PER WEEK YOU HAVE A DRINK CONTAINING ALCOHOL: 0

## 2024-08-12 ASSESSMENT — COGNITIVE AND FUNCTIONAL STATUS - GENERAL
SUGGESTED CMS G CODE MODIFIER MOBILITY: CL
STANDING UP FROM CHAIR USING ARMS: A LOT
SUGGESTED CMS G CODE MODIFIER DAILY ACTIVITY: CK
HELP NEEDED FOR BATHING: A LOT
TURNING FROM BACK TO SIDE WHILE IN FLAT BAD: A LOT
DRESSING REGULAR UPPER BODY CLOTHING: A LITTLE
MOBILITY SCORE: 10
WALKING IN HOSPITAL ROOM: TOTAL
TOILETING: A LITTLE
MOVING FROM LYING ON BACK TO SITTING ON SIDE OF FLAT BED: A LOT
DAILY ACTIVITIY SCORE: 17
MOVING TO AND FROM BED TO CHAIR: A LOT
CLIMB 3 TO 5 STEPS WITH RAILING: TOTAL
DRESSING REGULAR LOWER BODY CLOTHING: A LOT
EATING MEALS: A LITTLE

## 2024-08-12 ASSESSMENT — SOCIAL DETERMINANTS OF HEALTH (SDOH)

## 2024-08-12 ASSESSMENT — PAIN DESCRIPTION - PAIN TYPE: TYPE: ACUTE PAIN

## 2024-08-12 ASSESSMENT — FIBROSIS 4 INDEX: FIB4 SCORE: 2.66

## 2024-08-13 ENCOUNTER — APPOINTMENT (OUTPATIENT)
Dept: RADIOLOGY | Facility: MEDICAL CENTER | Age: 69
DRG: 542 | End: 2024-08-13
Attending: STUDENT IN AN ORGANIZED HEALTH CARE EDUCATION/TRAINING PROGRAM
Payer: MEDICARE

## 2024-08-13 ENCOUNTER — TELEPHONE (OUTPATIENT)
Dept: INTERNAL MEDICINE | Facility: OTHER | Age: 69
End: 2024-08-13

## 2024-08-13 PROBLEM — D64.9 ANEMIA: Status: ACTIVE | Noted: 2024-08-13

## 2024-08-13 PROCEDURE — 700117 HCHG RX CONTRAST REV CODE 255: Mod: JZ | Performed by: STUDENT IN AN ORGANIZED HEALTH CARE EDUCATION/TRAINING PROGRAM

## 2024-08-13 PROCEDURE — 99233 SBSQ HOSP IP/OBS HIGH 50: CPT | Performed by: HOSPITALIST

## 2024-08-13 PROCEDURE — 770006 HCHG ROOM/CARE - MED/SURG/GYN SEMI*

## 2024-08-13 PROCEDURE — 72157 MRI CHEST SPINE W/O & W/DYE: CPT

## 2024-08-13 PROCEDURE — 97535 SELF CARE MNGMENT TRAINING: CPT

## 2024-08-13 PROCEDURE — 97163 PT EVAL HIGH COMPLEX 45 MIN: CPT

## 2024-08-13 PROCEDURE — 306637 HCHG MISC ORTHO ITEM RC 0274

## 2024-08-13 PROCEDURE — A9270 NON-COVERED ITEM OR SERVICE: HCPCS | Performed by: HOSPITALIST

## 2024-08-13 PROCEDURE — 700102 HCHG RX REV CODE 250 W/ 637 OVERRIDE(OP): Performed by: STUDENT IN AN ORGANIZED HEALTH CARE EDUCATION/TRAINING PROGRAM

## 2024-08-13 PROCEDURE — A9579 GAD-BASE MR CONTRAST NOS,1ML: HCPCS | Mod: JZ | Performed by: STUDENT IN AN ORGANIZED HEALTH CARE EDUCATION/TRAINING PROGRAM

## 2024-08-13 PROCEDURE — 72158 MRI LUMBAR SPINE W/O & W/DYE: CPT

## 2024-08-13 PROCEDURE — 700111 HCHG RX REV CODE 636 W/ 250 OVERRIDE (IP): Mod: JZ | Performed by: STUDENT IN AN ORGANIZED HEALTH CARE EDUCATION/TRAINING PROGRAM

## 2024-08-13 PROCEDURE — 700101 HCHG RX REV CODE 250: Performed by: HOSPITALIST

## 2024-08-13 PROCEDURE — A9270 NON-COVERED ITEM OR SERVICE: HCPCS | Performed by: STUDENT IN AN ORGANIZED HEALTH CARE EDUCATION/TRAINING PROGRAM

## 2024-08-13 PROCEDURE — 700102 HCHG RX REV CODE 250 W/ 637 OVERRIDE(OP): Performed by: HOSPITALIST

## 2024-08-13 PROCEDURE — L0457 TLSO FLEX TRNK SJ-SS PRE OTS: HCPCS

## 2024-08-13 RX ORDER — OXYCODONE HYDROCHLORIDE 5 MG/1
5 TABLET ORAL
Status: DISCONTINUED | OUTPATIENT
Start: 2024-08-13 | End: 2024-08-14 | Stop reason: HOSPADM

## 2024-08-13 RX ORDER — LIDOCAINE 4 G/G
1 PATCH TOPICAL EVERY 24 HOURS
Status: DISCONTINUED | OUTPATIENT
Start: 2024-08-13 | End: 2024-08-14 | Stop reason: HOSPADM

## 2024-08-13 RX ORDER — OXYCODONE HYDROCHLORIDE 5 MG/1
2.5 TABLET ORAL
Status: DISCONTINUED | OUTPATIENT
Start: 2024-08-13 | End: 2024-08-14 | Stop reason: HOSPADM

## 2024-08-13 RX ORDER — ONDANSETRON 4 MG/1
4 TABLET, ORALLY DISINTEGRATING ORAL EVERY 4 HOURS PRN
Status: DISCONTINUED | OUTPATIENT
Start: 2024-08-13 | End: 2024-08-14 | Stop reason: HOSPADM

## 2024-08-13 RX ORDER — MORPHINE SULFATE 4 MG/ML
2 INJECTION INTRAVENOUS
Status: DISCONTINUED | OUTPATIENT
Start: 2024-08-13 | End: 2024-08-14 | Stop reason: HOSPADM

## 2024-08-13 RX ADMIN — LIDOCAINE 1 PATCH: 4 PATCH TOPICAL at 13:33

## 2024-08-13 RX ADMIN — HEPARIN SODIUM 5000 UNITS: 5000 INJECTION, SOLUTION INTRAVENOUS; SUBCUTANEOUS at 21:48

## 2024-08-13 RX ADMIN — HEPARIN SODIUM 5000 UNITS: 5000 INJECTION, SOLUTION INTRAVENOUS; SUBCUTANEOUS at 13:32

## 2024-08-13 RX ADMIN — MORPHINE SULFATE 2 MG: 4 INJECTION, SOLUTION INTRAMUSCULAR; INTRAVENOUS at 08:02

## 2024-08-13 RX ADMIN — OXYCODONE HYDROCHLORIDE 5 MG: 5 TABLET ORAL at 18:53

## 2024-08-13 RX ADMIN — GADOTERIDOL 7 ML: 279.3 INJECTION, SOLUTION INTRAVENOUS at 09:02

## 2024-08-13 RX ADMIN — ONDANSETRON 4 MG: 2 INJECTION INTRAMUSCULAR; INTRAVENOUS at 18:57

## 2024-08-13 RX ADMIN — OXYCODONE HYDROCHLORIDE 5 MG: 5 TABLET ORAL at 12:36

## 2024-08-13 RX ADMIN — PANCRELIPASE 72000 UNITS: 120000; 24000; 76000 CAPSULE, DELAYED RELEASE PELLETS ORAL at 13:32

## 2024-08-13 RX ADMIN — PANCRELIPASE 72000 UNITS: 120000; 24000; 76000 CAPSULE, DELAYED RELEASE PELLETS ORAL at 18:28

## 2024-08-13 RX ADMIN — ONDANSETRON 4 MG: 2 INJECTION INTRAMUSCULAR; INTRAVENOUS at 12:35

## 2024-08-13 ASSESSMENT — COGNITIVE AND FUNCTIONAL STATUS - GENERAL
STANDING UP FROM CHAIR USING ARMS: A LITTLE
SUGGESTED CMS G CODE MODIFIER MOBILITY: CK
WALKING IN HOSPITAL ROOM: A LITTLE
TURNING FROM BACK TO SIDE WHILE IN FLAT BAD: A LITTLE
MOBILITY SCORE: 18
CLIMB 3 TO 5 STEPS WITH RAILING: A LITTLE
MOVING FROM LYING ON BACK TO SITTING ON SIDE OF FLAT BED: A LITTLE
MOVING TO AND FROM BED TO CHAIR: A LITTLE

## 2024-08-13 ASSESSMENT — ENCOUNTER SYMPTOMS
RESPIRATORY NEGATIVE: 1
CARDIOVASCULAR NEGATIVE: 1
NEUROLOGICAL NEGATIVE: 1
BACK PAIN: 1
SORE THROAT: 0
DIAPHORESIS: 0
PSYCHIATRIC NEGATIVE: 1
HEADACHES: 0
GASTROINTESTINAL NEGATIVE: 1
WEAKNESS: 0
DIZZINESS: 0
VOMITING: 0
FOCAL WEAKNESS: 0
MYALGIAS: 0
CONSTITUTIONAL NEGATIVE: 1
COUGH: 0
PALPITATIONS: 0
WEIGHT LOSS: 0
NAUSEA: 0
SHORTNESS OF BREATH: 0
FEVER: 0
DEPRESSION: 0
CHILLS: 0
BRUISES/BLEEDS EASILY: 0
ABDOMINAL PAIN: 0
BLURRED VISION: 0
EYES NEGATIVE: 1

## 2024-08-13 ASSESSMENT — PAIN DESCRIPTION - PAIN TYPE
TYPE: ACUTE PAIN

## 2024-08-13 ASSESSMENT — GAIT ASSESSMENTS
GAIT LEVEL OF ASSIST: CONTACT GUARD ASSIST
ASSISTIVE DEVICE: FRONT WHEEL WALKER
DEVIATION: ANTALGIC;STEP TO;SHUFFLED GAIT;DECREASED HEEL STRIKE;DECREASED TOE OFF
DISTANCE (FEET): 100

## 2024-08-13 ASSESSMENT — LIFESTYLE VARIABLES: SUBSTANCE_ABUSE: 0

## 2024-08-13 NOTE — ED NOTES
Patient reports she does have a J-tube for feedings, however reports she can eat a little at times, reports she takes medications orally. Pt states her J-tube is stapled to her intestine.

## 2024-08-13 NOTE — PROGRESS NOTES
Received patient from ED via gurney. Patient is alert and oriented x4. On RA. On saline lock, no signs of infiltration or phlebitis at this time. Admit profile and skin check completed. VSS. NPO. Fall precautions in place and call light within reach. All other needs met at this time.

## 2024-08-13 NOTE — PROGRESS NOTES
"Hospital Medicine Daily Progress Note    Date of Service  8/13/2024    Chief Complaint  Kathy Dumont is a 68 y.o. female admitted 8/12/2024 with acute back pain    Hospital Course  Kathy Dumont is a 68 y.o. female who presented 8/12/2024 with intractable back pain.  Patient is a known past medical history of prior thoracic and lumbar compression fractures.  Patient states that she was gardening Friday night where she was picking beans, and she subsequently felt a sharp increase in pain.  She denies any trauma or falls.  Her since then, the patient has had worsening back pain, and has difficulty ambulating.  Repeat imaging was obtained showing acute versus chronic fractures in both her lumbar and thoracic spine.  Unknown chronicity.  She denies any loss of strength or sensation in her bilateral upper or lower extremities.  Denies any bowel or stool incontinence.  Patient was\\ admitted for pain management, MRI, and neurosurgical evaluation.     Interval Problem Update  Axox3, states pain is 3/10 at rest, L1, radiating down R leg to knee, \"aching\", worse with attempts to ambulate. ROS otherwise negative, mri reviewed and discussed with neurosurgery Dr. Bermudez who recommends TLSO and will come eval. PT and OT pending. ROS otherwise negative    I have discussed this patient's plan of care and discharge plan at IDT rounds today with Case Management, Nursing, Nursing leadership, and other members of the IDT team.    Consultants/Specialty  Lara    Code Status  Full Code    Disposition  The patient is not medically cleared for discharge to home or a post-acute facility.      I have placed the appropriate orders for post-discharge needs.    Review of Systems  Review of Systems   Constitutional: Negative.  Negative for chills, diaphoresis, fever, malaise/fatigue and weight loss.   HENT: Negative.  Negative for sore throat.    Eyes: Negative.  Negative for blurred vision.   Respiratory: " Negative.  Negative for cough and shortness of breath.    Cardiovascular: Negative.  Negative for chest pain, palpitations and leg swelling.   Gastrointestinal: Negative.  Negative for abdominal pain, nausea and vomiting.   Genitourinary: Negative.  Negative for dysuria.   Musculoskeletal:  Positive for back pain. Negative for myalgias.   Skin: Negative.  Negative for itching and rash.   Neurological: Negative.  Negative for dizziness, focal weakness, weakness and headaches.   Endo/Heme/Allergies: Negative.  Does not bruise/bleed easily.   Psychiatric/Behavioral: Negative.  Negative for depression, substance abuse and suicidal ideas.    All other systems reviewed and are negative.       Physical Exam  Temp:  [36.3 °C (97.4 °F)-36.8 °C (98.2 °F)] 36.8 °C (98.2 °F)  Pulse:  [69-80] 76  Resp:  [14-18] 18  BP: ()/(38-67) 110/58  SpO2:  [92 %-98 %] 95 %    Physical Exam  Vitals and nursing note reviewed. Exam conducted with a chaperone present.   Constitutional:       General: She is not in acute distress.     Appearance: Normal appearance. She is not diaphoretic.   HENT:      Head: Normocephalic.      Comments: Temporal muscle and fat wasting      Nose: Nose normal.      Mouth/Throat:      Mouth: Mucous membranes are moist.   Eyes:      Pupils: Pupils are equal, round, and reactive to light.   Cardiovascular:      Rate and Rhythm: Normal rate and regular rhythm.      Pulses: Normal pulses.      Heart sounds: Normal heart sounds.   Pulmonary:      Effort: Pulmonary effort is normal.      Breath sounds: Normal breath sounds.   Abdominal:      General: Abdomen is flat. Bowel sounds are normal.      Palpations: Abdomen is soft.   Musculoskeletal:         General: No swelling or deformity. Normal range of motion.      Comments: Lumbar tenderness to palp  Sensation intact, + straight leg raise on R 45 degrees   Skin:     General: Skin is warm and dry.      Capillary Refill: Capillary refill takes less than 2 seconds.    Neurological:      General: No focal deficit present.      Mental Status: She is alert and oriented to person, place, and time.      Cranial Nerves: No cranial nerve deficit.   Psychiatric:         Mood and Affect: Mood normal.         Behavior: Behavior normal.         Fluids    Intake/Output Summary (Last 24 hours) at 8/13/2024 1817  Last data filed at 8/13/2024 1400  Gross per 24 hour   Intake 120 ml   Output 100 ml   Net 20 ml       Laboratory  Recent Labs     08/12/24 2036   WBC 8.1   RBC 3.90*   HEMOGLOBIN 10.3*   HEMATOCRIT 31.9*   MCV 81.8   MCH 26.4*   MCHC 32.3   RDW 49.0   PLATELETCT 422   MPV 9.0     Recent Labs     08/12/24 2036   SODIUM 134*   POTASSIUM 4.1   CHLORIDE 98   CO2 25   GLUCOSE 97   BUN 21   CREATININE 0.45*   CALCIUM 8.5                   Imaging  MR-THORACIC SPINE-WITH & W/O   Final Result      1.  Acute/subacute compression fracture T6 vertebral body. This fracture is amenable for vertebral augmentation.   2.  Chronic compression deformities at T9, T10 and T11 vertebral bodies.      MR-LUMBAR SPINE-WITH & W/O   Final Result      1.  Acute/subacute compression fracture along the superior endplate of L1 vertebral body. There is minimal posterior retropulsion without canal compromise. This is amenable for vertebral augmentation.   2.  Subacute/chronic compression fractures of bilateral sacral area.   3.  Grade 1 spondylolisthesis of L5 on S1 with bilateral L5 spondylolysis.   4.  Moderate bilateral L5 neural foraminal stenosis with nerve root flattening.      OUTSIDE IMAGES-CT LUMBAR SPINE   Final Result      OUTSIDE IMAGES-CT THORACIC SPINE   Final Result      OUTSIDE IMAGES-CT ABDOMEN /PELVIS   Final Result           Assessment/Plan  * Compression fracture of body of thoracic vertebra (HCC)  Assessment & Plan  CT imaging: Compression fracture of T6 of 40% and L1 20% which is new compared to September 2023.  Uncertain chronicity.  No retropulsion.  There are old compression fractures  of T5, T7 T9-10 and 11 unchanged     L1 compression fx with mild retropulsion noted on mri - see above, discussed with neurosurgery  TLSO and PT/ OT pending  Supportive care  Multimodal pain control with IV and p.o. narcotics.     Anemia  Assessment & Plan  Chronic  No melena etc  Will repeat cbc in am, normal mcv    Intractable back pain- (present on admission)  Assessment & Plan  Multimodal pain control including IV and p.o. narcotics.  Continue close hemodynamic monitoring  Add trial of lidocaine  following    Malnutrition compromising bodily function (HCC)- (present on admission)  Assessment & Plan  With severe malnutrition  See above  Supplemental nocturnal tube feeds    Severe protein-calorie malnutrition (HCC)- (present on admission)  Assessment & Plan  With temporal muscle wasting and fat wasting  Continue nocturnal tube feeds for increased calorie   Nutrition specialist following    Delayed gastric emptying- (present on admission)  Assessment & Plan  Continue nocturnal j tube feeding    Anorexia- (present on admission)  Assessment & Plan  Nutrition consult placed          VTE prophylaxis:heparin    I have performed a physical exam and reviewed and updated ROS and Plan today (8/13/2024). In review of yesterday's note (8/12/2024), there are no changes except as documented above.

## 2024-08-13 NOTE — ASSESSMENT & PLAN NOTE
Multimodal pain control including IV and p.o. narcotics.  Continue close hemodynamic monitoring  Add trial of lidocaine  following

## 2024-08-13 NOTE — DIETARY
"Nutrition Services: Initial Assessment  Day 1 of admit.  Kathy Dumont is a 68 y.o. female with admitting DX of compression fracture of spine, intractable back pain.      Consult received for calorie count, BMI<19, anorexia on problem list, and \"diet tube feeding per RD\".     Current hospital problems list:  Intractable back pain  Compression fracture of body of thoracic vertebra  Anorexia     Nutrition Assessment:   Height: 157.5 cm (5' 2\")  Weight: 35.4 kg (78 lb) - other healthcare  Body mass index is 14.27 kg/m²., BMI classification: underweight   Wt Readings from Last 5 Encounters:   24 35.4 kg (78 lb)   24 35.4 kg (78 lb)   24 34.5 kg (76 lb)   24 32.7 kg (72 lb 3.2 oz)   24 31.1 kg (68 lb 9.6 oz)        Calculation/Equation: MSJ x 1.5 = 1257 kcals  Total Calories / day: 1150 - 1450 (Calories / k - 41)  Total Grams Protein / day: 53 - 71 (Grams Protein / k.5 - 2.0)     Objective:   Pertinent medical hx: acute recurrent pancreatitis, arthritis, celiac disease, disorder of thyroid, gastroparesis, gluten intolerance, and stomach pain.  Pt has J-tube in place, had placed per pt and family because pt was unable to maintain adequate nutrition and eats for please.   Pt describes eating: small gluten free muffin and coffee at breakfast, fruit cup and a few crackers at lunch, and 1 fish stick with a tablespoon of veggies. Pt is not maintaining adequate PO intake at home, calorie count is not indicated as plan is for starting TF and PO intake at home is not meeting kcal or protein needs.   Pt/spouse reporting home enteral nutrition of Peptamen 1.5 at 75 ml/hr x 11 hours per day which provides 1238 kcals, 56 gm protein, and 635 ml free water per day.   Pertinent labs: Na 134, Creat 0.45, Alk phos 237  Pertinent meds: creon 72,000 units with meals, anti-emetics prn  Skin/wounds: No wounds or edema noted.   Food Allergies: gluten meal  Last BM: PTA   Gluten free peptide " based formula is indicated to mimic home enteral TF regimen that pt has tolerated.     Current diet order/intake: Pt is on regular diet, working on lunch tray during visit. No PO intake recorded yet.     Subjective:   Nutrition Focused Physical Exam (NFPE)   Weight loss: Pt previously weighed 63 lbs on 3/26/24, which indicates weight increase.   Muscle mass:  Severe to clavicles, scapula, triceps, temples, and calves.   Subcutaneous fat: Severe fat wasting to clavicles, scapula, triceps, temples, and calves.   Fluid Accumulation: No edema noted.   Reduced  Strength N/A in acute care setting.        Nutrition Diagnosis: (PES)      Severe malnutrition in context of chronic related to anorexia nervosa as evidenced by severe muscle and severe fat wasting. Asked MD to add to problem list.     Nutrition Interventions:   Start TF Pivot 1.5 (equivalent to home formula) at 40 ml/hr for 2-3 hours to establish TF tolerance and then bump up goal rate 75 ml/hr x 11 hours overnight to mimic home regimen (2309-8771) to provide 1237 kcals, 77 gm protein, and 626 ml free water per day.   Fluids per MD.   Regular diet as tolerated.   Calorie count is not indicated.  Patient aware of active plan of care as appropriate.     Nutrition Monitoring and Evaluation:   Monitor for TF tolerance and progression of TF to goal rate.  Monitor nutrition POC  Monitor vital signs pertinent to nutrition (labs, meds, weight trend).     RD following.

## 2024-08-13 NOTE — ED NOTES
Bedside report received from off going RN/tech: YOUSUF Baez, assumed care of patient.  POC discussed with patient. Pt in direct view of RN  Fall risk interventions in place: Patient's personal possessions are with in their safe reach, Place socks on patient, Keep floor surfaces clean and dry, and Accompanied to restroom (all applicable per [facility] Fall risk assessment)   Continuous monitoring: Pulse Ox or Blood Pressure  IVF/IV medications: Not Applicable   Oxygen: Room Air  Bedside sitter: Not Applicable   Isolation: Not Applicable

## 2024-08-13 NOTE — PROGRESS NOTES
Hourly rounds performed. Patient is resting in bed, Respirations even and unlabored. Fall precautions continued and call light within reach. All other needs met at this time.

## 2024-08-13 NOTE — ED PROVIDER NOTES
ER Provider Note    Scribed for Dr. Jihan Olmos D.O. by Lana Edward. 8/12/2024  6:45 PM    Primary Care Provider: Zoey Thurman P.A.-C.    CHIEF COMPLAINT  Chief Complaint   Patient presents with    Back Pain     Pt has hx of old compression fx. Pt was out Friday night in her garden picking green beans when she felt increased back pain. Pt was unable to walk today due to backpain     EXTERNAL RECORDS REVIEWED  Outpatient Notes Patient was last seen by Gerontology on 3/26/24 for chronic pancreatitis follow up. She has a history of anorexia nervosa, gastroparesis and extensive GI history. Gastric emptying study done in 2021 demonstrated retained significant gastric activity, indicating delayed gastric emptying.     HPI/ROS  LIMITATION TO HISTORY   Select: : None    OUTSIDE HISTORIAN(S):  None    Kathy Dumont is a 68 y.o. female with a history of anorexia nervosa and gastroparesis who presents to the ED via EMS as a transfer from [facility] for evaluation of worsening chronic low back pain onset three days ago. Patient's CT scan demonstrated prior T5, 7, 9, 10, 11 compression fractures which are unchanged since September 2023. She reports gardening on Friday, bent over the pick some beans and felt an acute onset of pain in her low back which now radiates down her right posterior hip and into her leg., her back pain has been worsening over the weekend. she woke up this morning with a new aching pain which radiated down her right leg. CT scan demonstrated potentially new L1 and T6 from prior 2023 imaging. Patient has a history of a gastric bypass procedure and reports she only weighted 58 pounds before the procedure reporting a complication which required a feeding tube. Patient was given Toradol at outside facility. No alleviating or exacerbating factors noted.    PAST MEDICAL HISTORY  Past Medical History:   Diagnosis Date    Acute recurrent pancreatitis 08/2021    Anesthesia     PONV     Arthritis 08/2021    fingers    Celiac disease     Disorder of thyroid     hypothyroid    Gastroparesis 08/2021    Gluten intolerance     Pain stomach issues     SURGICAL HISTORY  Past Surgical History:   Procedure Laterality Date    [place] UPPER GI ENDOSCOPY,DIAGNOSIS N/A 2/2/2024    Procedure: GASTROSCOPY WITH J TUBE REVISION;  Surgeon: Davin Garces M.D.;  Location: SURGERY SAME DAY [facility];  Service: Gastroenterology    [place] UPPER GI ENDOSCOPY,DIAGNOSIS N/A 2/2/2024    Procedure: GASTROSCOPY;  Surgeon: Davin Garces M.D.;  Location: SURGERY SAME DAY [facility];  Service: Gastroenterology    [place] UPPER GI ENDOSCOPY,CTRL BLEED N/A 2/2/2024    Procedure: EGD, WITH CLIP PLACEMENT;  Surgeon: Davin Garces M.D.;  Location: SURGERY SAME DAY [facility];  Service: Gastroenterology    [place] UPPER GI ENDOSCOPY,DIAGNOSIS N/A 1/30/2024    Procedure: GASTROSCOPY;  Surgeon: Claudia Oakes M.D.;  Location: SURGERY SAME DAY [facility];  Service: Gastroenterology    GASTROSTOMY N/A 1/30/2024    Procedure: PERCUTANEOUS ENDOSCOPIC GASTROSTOMY WITH TUBE PLACEMENT, JEJUNOSTOMY TUBE;  Surgeon: Claudia Oakes M.D.;  Location: SURGERY SAME DAY [facility];  Service: Gastroenterology    GASTROJEJUNOSTOMY N/A 05/05/2023    Procedure: MAIK-EN-Y GASTROJEJUNOSTOMY BYPASS AND EXPLORATORY LAPAORTOMY;  Surgeon: Bradly Cisneros M.D.;  Location: [facility];  Service: General    [place] UPPER GI ENDOSCOPY,DIAGNOSIS  04/18/2022    Procedure: GASTROSCOPY - WITH PYLORUS DILATION WITH BOTOX INJECTION;  Surgeon: Rivas Cadet M.D.;  Location: [facility];  Service: Gastroenterology    [place] ERCP,DIAGNOSTIC  04/18/2022    Procedure: ERCP (ENDOSCOPIC RETROGRADE CHOLANGIOPANCREATOGRAPHY);  Surgeon: Rivas Cadet M.D.;  Location: [facility];  Service: Gastroenterology    [place] UPPER GI ENDOSCOPY,DIAGNOSIS N/A 08/09/2021    Procedure: GASTROSCOPY;  Surgeon: Rivas Cadet M.D.;  Location: [facility]  "[facility];  Service: EUS    [place] ERCP,DIAGNOSTIC N/A 08/09/2021    Procedure: ERCP, DIAGNOSTIC;  Surgeon: Rivas Cadet M.D.;  Location: SURGERY [facility];  Service: EUS    EGD W/ENDOSCOPIC ULTRASOUND N/A 08/09/2021    Procedure: EGD, WITH ENDOSCOPIC US - UPPER RADIAL;  Surgeon: Rivas Cadet M.D.;  Location: SURGERY [facility];  Service: EUS    ORIF, HUMERUS Left 2006    KNEE ARTHROSCOPY Left 1973    CHOLECYSTECTOMY  1963    APPENDECTOMY CHILD      OTHER  5-21-23    PRIMARY C SECTION  1984, 1987, 1990     FAMILY HISTORY  No pertinent family history    SOCIAL HISTORY   reports that she has never smoked. She has never been exposed to tobacco smoke. She has never used smokeless tobacco. She reports that she does not currently use drugs after having used the following drugs: Oral. She reports that she does not drink alcohol.    CURRENT MEDICATIONS  Previous Medications    CALCIUM PO    Take 1 Tablet by mouth every day.    LEVOTHYROXINE (SYNTHROID) 50 MCG TAB    Take 50 mcg by mouth every morning on an empty stomach.    MULTIVITAMIN TAB    Take 1 Tablet by mouth every day.    ONDANSETRON (ZOFRAN) 4 MG TAB TABLET    Take 4 mg by mouth every 8 hours as needed for Nausea/Vomiting.    OXYCODONE CR (OXYCONTIN) 40 MG TABLET EXTENDED RELEASE 12 HOUR ABUSE-DETERRENT TABLET    Take 40 mg by mouth every 12 hours as needed.    PANCRELIPASE, LIP-PROT-AMYL, (CREON) 87020-890710 UNITS CAP DR PARTICLES    Take 2 Capsules by mouth 3 times a day. Take 2 capsules per each meal, and 1 capsule per snack    PANCRELIPASE, LIP-PROT-AMYL, [id number] UNITS CAP DR PARTICLES    Take 2 Capsules by mouth 3 times a day with meals.     ALLERGIES  Hydrocodone-acetaminophen, Loratadine-pseudoephedrine, Sertraline hcl, Tramadol, Zoloft, Codeine, and Gluten meal    PHYSICAL EXAM  /67   Pulse 79   Temp 36.4 °C (97.6 °F) (Temporal)   Resp 18   Ht 1.575 m (5' 2\")   Wt 35.4 kg (78 lb)   SpO2 98%   BMI 14.27 kg/m² "   Constitutional: Patient is a very thin cachectic pale female in moderate distress with any movement.  HENT: Normocephalic, atraumatic.  Nares are patent and clear, oral mucosa is dry.  Neck: Supple with no tenderness.  Cardiovascular: Normal heart rate and Regular rhythm. No murmur  Thorax & Lungs: Clear and equal breath sounds with good excursion. No respiratory distress, no rhonchi, wheezing or rales.  Abdomen: Bowel sounds normal in all four quadrants. Soft,nontender, no rebound , guarding, palpable masses.   Skin: Warm, Dry, No contusions, abrasions  Back: No cervical tenderness. Tender along thoracal lumbar spine midline worse lower lumbar region extends across SI joint and gluteal region  Extremities: Peripheral pulses 4/4 No edema, No tenderness    Neurologic: Alert & oriented x 3, Normal motor function, Normal sensory function, No lateralizing or focal deficits noted. DTR's 4/4 bilaterally. Plantar flexion and dorsal flexion is weak   Psychiatric: Affect normal, Judgment normal, Mood normal.     DIAGNOSTIC STUDIES & PROCEDURES  Radiology:   The attending Emergency Physician has independently interpreted the diagnostic imaging associated with this visit and is awaiting the final reading from the radiologist, which will be displayed below.  Preliminary interpretation is a follows: Multiple compression fractures  Radiologist interpretation:  OUTSIDE IMAGES-CT LUMBAR SPINE   Final Result      OUTSIDE IMAGES-CT THORACIC SPINE   Final Result      OUTSIDE IMAGES-CT ABDOMEN /PELVIS   Final Result         COURSE & MEDICAL DECISION MAKING    INITIAL ASSESSMENT AND PLAN  Care Narrative:       7:09 PM - Patient was seen and evaluated at bedside. Patient presents to the ED for back pain.  After my exam, I discussed with the patient the plan of care, which includes treating the patient with medication for their symptoms, as well as hospitalization for pain management. Patient understands and verbalizes agreement to plan  of care. Patient will be treated with morphine 4 mg and Zofran 4 mg.     Patient was treated for her pain in the emergency department and plan will be to admit the patient into the hospital.    7:35 PM - Paged Hospitalist.     7:42 PM - Hospitalist responded. I discussed the patient's case and the above findings with Dr. Leblanc (Hospitalist) who agrees to evaluate the patient for hospitalization.                  DISPOSITION AND DISCUSSIONS  I have discussed management of the patient with the following physicians and SAULO's: Dr. Leblanc (Hospitalist)    Discussion of management with other QHP or appropriate source(s): None     Barriers to care at this time, including but not limited to:  None .     Decision tools and prescription drugs considered including, but not limited to: Hospital admission for pain control and spine evaluation in the morning..    DISPOSITION:  Patient will be hospitalized by Dr. Leblanc in guarded condition.    FINAL IMPRESSION   1. Intractable low back pain    2. Closed compression fracture of L1 lumbar vertebra, initial encounter ([facility])    3. Closed compression fracture of thoracic vertebra, initial encounter ([facility])       Lana NEGRO (Sandi), am scribing for, and in the presence of, Jihan Olmos D.O..    Electronically signed by: Lana Edward (Sandi), 8/12/2024    IJihan D.O. personally performed the services described in this documentation, as scribed by Lana Edward in my presence, and it is both accurate and complete.    The note accurately reflects work and decisions made by me.  Jihan Olmos D.O.  8/13/2024  1:22 AM

## 2024-08-13 NOTE — THERAPY
Occupational Therapy Contact Note    Patient Name: Kathy Dumont  Age:  68 y.o., Sex:  female  Medical Record #: [id number]  Today's Date: 8/13/2024    OT consult received, pt pending MRIs and neurosurgical evaluation for compression fxs. Will hold OT evaluation and follow up as able/appropriate for OT evaluation.       Wilian Sim OTD, OTR/L

## 2024-08-13 NOTE — ED NOTES
Re-assessed pt's pain, pt reports that her pain is doing better, offered pt pain medication, pt denies at this time.

## 2024-08-13 NOTE — ASSESSMENT & PLAN NOTE
CT imaging: Compression fracture of T6 of 40% and L1 20% which is new compared to September 2023.  Uncertain chronicity.  No retropulsion.  There are old compression fractures of T5, T7 T9-10 and 11 unchanged     L1 compression fx with mild retropulsion noted on mri - see above, discussed with neurosurgery  TLSO and PT/ OT pending  Supportive care  Multimodal pain control with IV and p.o. narcotics.

## 2024-08-13 NOTE — PROGRESS NOTES
Order for off the shelf TLSO brace has been submitted to [facility]. If any questions [facility] can be reached at ph # [phone].

## 2024-08-13 NOTE — ED TRIAGE NOTES
"Chief Complaint   Patient presents with    Back Pain     Pt has hx of old compression fx. Pt was out Friday night in her garden picking green beans when she felt increased back pain. Pt was unable to walk today due to backpain         Pt transfer from [facility] for above. Pt showed CT scan of Tspine old and new fractures., T6-L1. Pt denies any numbness/tingline to lower extremities, CMS intact.  Pt has J tube in place as well that she gets feeds through.  Pt aox4 gcs 15      /67   Pulse 79   Temp 36.4 °C (97.6 °F) (Temporal)   Resp 18   Ht 1.575 m (5' 2\")   Wt 35.4 kg (78 lb)   SpO2 98%   BMI 14.27 kg/m²     "

## 2024-08-13 NOTE — THERAPY
Physical Therapy Contact Note    Patient Name: Kathy Dumont  Age:  68 y.o., Sex:  female  Medical Record #: [id number]  Today's Date: 8/13/2024 08/13/24 1400   Initial Contact Note    Initial Contact Note Order Received and Verified, Physical Therapy Evaluation in Progress with Full Report to Follow.   Interdisciplinary Plan of Care Collaboration   IDT Collaboration with  Nursing   Collaboration Comments pt awaiting TLSO   Session Information   Date / Session Number  8/13 ( needs TLSO )

## 2024-08-13 NOTE — H&P
Hospital Medicine History & Physical Note    Date of Service  8/12/2024    Primary Care Physician  Zoey Thurman P.A.-C.        Code Status  Full Code    Chief Complaint  Chief Complaint   Patient presents with    Back Pain     Pt has hx of old compression fx. Pt was out Friday night in her garden picking green beans when she felt increased back pain. Pt was unable to walk today due to backpain       History of Presenting Illness  Kathy Dumont is a 68 y.o. female who presented 8/12/2024 with intractable back pain.  Patient is a known past medical history of prior thoracic and lumbar compression fractures.  Patient states that she was gardening Friday night where she was picking beans, and she subsequently felt a sharp increase in pain.  She denies any trauma or falls.  Her since then, the patient has had worsening back pain, and has difficulty ambulating.  Repeat imaging was obtained showing acute versus chronic fractures in both her lumbar and thoracic spine.  Unknown chronicity.  She denies any loss of strength or sensation in her bilateral upper or lower extremities.  Denies any bowel or stool incontinence.  Patient will be admitted for pain management, MRI, and neurosurgical evaluation.    I discussed the plan of care with patient.    Review of Systems  Review of Systems   Constitutional:  Negative for chills and fever.   HENT:  Negative for congestion, ear discharge, ear pain, nosebleeds, sinus pain and tinnitus.    Eyes:  Negative for double vision, photophobia, pain and discharge.   Respiratory:  Negative for hemoptysis, sputum production and shortness of breath.    Cardiovascular:  Negative for chest pain, palpitations and orthopnea.   Gastrointestinal:  Negative for abdominal pain, diarrhea, nausea and vomiting.   Musculoskeletal:  Positive for back pain. Negative for falls, joint pain and neck pain.   Neurological:  Negative for dizziness and headaches.   Psychiatric/Behavioral:  Negative  "for hallucinations, substance abuse and suicidal ideas. The patient is not nervous/anxious and does not have insomnia.        Past Medical History   has a past medical history of Acute recurrent pancreatitis (08/2021), Anesthesia, Arthritis (08/2021), Celiac disease, Disorder of thyroid, Gastroparesis (08/2021), Gluten intolerance, and Pain (stomach issues).    Surgical History   has a past surgical history that includes appendectomy child; cholecystectomy (1963); knee arthroscopy (Left, 1973); primary c section (1984, 1987, 1990); orif, humerus (Left, 2006); pr upper gi endoscopy,diagnosis (N/A, 08/09/2021); pr ercp,diagnostic (N/A, 08/09/2021); egd w/endoscopic ultrasound (N/A, 08/09/2021); pr upper gi endoscopy,diagnosis (04/18/2022); pr ercp,diagnostic (04/18/2022); gastrojejunostomy (N/A, 05/05/2023); other (5-21-23); pr upper gi endoscopy,diagnosis (N/A, 1/30/2024); gastrostomy (N/A, 1/30/2024); pr upper gi endoscopy,diagnosis (N/A, 2/2/2024); pr upper gi endoscopy,diagnosis (N/A, 2/2/2024); and pr upper gi endoscopy,ctrl bleed (N/A, 2/2/2024).     Family History  family history is not on file.   Family history reviewed with patient. There is no family history that is pertinent to the chief complaint.     Social History   reports that she has never smoked. She has never been exposed to tobacco smoke. She has never used smokeless tobacco. She reports that she does not currently use drugs after having used the following drugs: Oral. She reports that she does not drink alcohol.    Allergies  Allergies   Allergen Reactions    Hydrocodone-Acetaminophen Rash     Rash, \"I can tolerate in small amounts\"      Loratadine-Pseudoephedrine Rash     rash    Sertraline Hcl Palpitations    Tramadol Vomiting     Fainting    Zoloft Rash     rash    Codeine Vomiting    Gluten Meal      \"violent stomach upset for 4-5 days\"       Medications  Prior to Admission Medications   Prescriptions Last Dose Informant Patient Reported? " Taking?   CALCIUM PO  Patient Yes No   Sig: Take 1 Tablet by mouth every day.   Pancrelipase, Lip-Prot-Amyl, (CREON) 42369-966897 units Cap DR Particles  Patient No No   Sig: Take 2 Capsules by mouth 3 times a day. Take 2 capsules per each meal, and 1 capsule per snack   Pancrelipase, Lip-Prot-Amyl, 22258-146984 units Cap DR Particles  Patient No No   Sig: Take 2 Capsules by mouth 3 times a day with meals.   levothyroxine (SYNTHROID) 50 MCG Tab  Patient Yes No   Sig: Take 50 mcg by mouth every morning on an empty stomach.   multivitamin Tab  Patient Yes No   Sig: Take 1 Tablet by mouth every day.   ondansetron (ZOFRAN) 4 MG Tab tablet  Patient Yes No   Sig: Take 4 mg by mouth every 8 hours as needed for Nausea/Vomiting.   oxyCODONE CR (OXYCONTIN) 40 MG Tablet Extended Release 12 hour Abuse-Deterrent tablet  Patient Yes No   Sig: Take 40 mg by mouth every 12 hours as needed.      Facility-Administered Medications: None       Physical Exam  Temp:  [36.4 °C (97.6 °F)] 36.4 °C (97.6 °F)  Pulse:  [71-79] 71  Resp:  [14-18] 14  BP: (105-118)/(63-67) 105/63  SpO2:  [95 %-98 %] 95 %  Blood Pressure : 105/63   Temperature: 36.4 °C (97.6 °F)   Pulse: 71   Respiration: 14   Pulse Oximetry: 95 %       Physical Exam  Constitutional:       General: She is not in acute distress.     Appearance: Normal appearance. She is normal weight. She is not ill-appearing, toxic-appearing or diaphoretic.   HENT:      Head: Normocephalic and atraumatic.      Nose: Nose normal.      Mouth/Throat:      Mouth: Mucous membranes are moist.   Eyes:      Extraocular Movements: Extraocular movements intact.      Pupils: Pupils are equal, round, and reactive to light.   Cardiovascular:      Rate and Rhythm: Normal rate and regular rhythm.      Pulses: Normal pulses.      Heart sounds: Normal heart sounds. No murmur heard.     No friction rub. No gallop.   Pulmonary:      Effort: Pulmonary effort is normal. No respiratory distress.      Breath sounds: No  "stridor. No wheezing, rhonchi or rales.   Chest:      Chest wall: No tenderness.   Abdominal:      General: Abdomen is flat. There is no distension.      Palpations: Abdomen is soft. There is no mass.      Tenderness: There is no abdominal tenderness. There is no guarding or rebound.      Hernia: No hernia is present.   Musculoskeletal:         General: Tenderness present. No swelling, deformity or signs of injury.      Right lower leg: No edema.      Left lower leg: No edema.      Comments: Thoracic and lumbar spinal tenderness to palpation   Skin:     General: Skin is warm and dry.      Capillary Refill: Capillary refill takes less than 2 seconds.      Coloration: Skin is not jaundiced or pale.      Findings: No bruising, erythema, lesion or rash.   Neurological:      General: No focal deficit present.      Mental Status: She is alert and oriented to person, place, and time. Mental status is at baseline.      Cranial Nerves: No cranial nerve deficit.      Sensory: No sensory deficit.      Motor: No weakness.      Coordination: Coordination normal.   Psychiatric:         Mood and Affect: Mood normal.         Behavior: Behavior normal.         Laboratory:          No results for input(s): \"ALTSGPT\", \"ASTSGOT\", \"ALKPHOSPHAT\", \"TBILIRUBIN\", \"DBILIRUBIN\", \"GAMMAGT\", \"AMYLASE\", \"LIPASE\", \"ALB\", \"PREALBUMIN\", \"GLUCOSE\" in the last 72 hours.      No results for input(s): \"NTPROBNP\" in the last 72 hours.      No results for input(s): \"TROPONINT\" in the last 72 hours.    Imaging:  OUTSIDE IMAGES-CT LUMBAR SPINE   Final Result      OUTSIDE IMAGES-CT THORACIC SPINE   Final Result      OUTSIDE IMAGES-CT ABDOMEN /PELVIS   Final Result      MR-THORACIC SPINE-WITH & W/O    (Results Pending)   MR-LUMBAR SPINE-WITH & W/O    (Results Pending)       X-Ray:  I have personally reviewed the images and compared with prior images.  EKG:  I have personally reviewed the images and compared with prior " images.    Assessment/Plan:  Justification for Admission Status  I anticipate this patient will require at least two midnights for appropriate medical management, necessitating inpatient admission because intractable back pain    Patient will need a Med/Surg bed on MEDICAL service .  The need is secondary to intractable back pain.    * Compression fracture of body of thoracic vertebra (HCC)  Assessment & Plan  CT imaging: Compression fracture of T6 of 40% and L1 20% which is new compared to September 2023.  Uncertain chronicity.  No retropulsion.  There are old compression fractures of T5, T7 T9-10 and 11 unchanged     Follow up MRI T and L-spine with and without contrast  Multimodal pain control with IV and p.o. narcotics.  Show Zofran need close hemodynamic monitoring  PT OT evaluation  Day team to consult neurosurgery/spine surgery    Intractable back pain- (present on admission)  Assessment & Plan  Multimodal pain control including IV and p.o. narcotics.  She will therefore need close hemodynamic monitoring    Anorexia- (present on admission)  Assessment & Plan  Nutrition consult placed         VTE prophylaxis: enoxaparin ppx

## 2024-08-13 NOTE — CARE PLAN
The patient is Watcher - Medium risk of patient condition declining or worsening    Shift Goals  Clinical Goals: pain management, MRI  Patient Goals: rest  Family Goals: uriah    Progress made toward(s) clinical / shift goals:  updated patient on POC and verbalized understanding. NPO. Needs attended      Patient is not progressing towards the following goals:      Problem: Knowledge Deficit - Standard  Goal: Patient and family/care givers will demonstrate understanding of plan of care, disease process/condition, diagnostic tests and medications  Description: Target End Date:  1-3 days or as soon as patient condition allows    Document in Patient Education    1.  Patient and family/caregiver oriented to unit, equipment, visitation policy and means for communicating concern  2.  Complete/review Learning Assessment  3.  Assess knowledge level of disease process/condition, treatment plan, diagnostic tests and medications  4.  Explain disease process/condition, treatment plan, diagnostic tests and medications  Outcome: Not Progressing

## 2024-08-13 NOTE — PROGRESS NOTES
Patient left the floor at this time via stretcher for MRI at this time, pre medicated with morphine

## 2024-08-13 NOTE — PROGRESS NOTES
4 Eyes Skin Assessment Completed by Maximilian RN and Kathy RN.    Head WDL  Ears WDL  Nose WDL  Mouth WDL  Neck WDL  Breast/Chest WDL  Shoulder Blades WDL  Spine Bruising  (R) Arm/Elbow/Hand Redness, Blanching, and Scab  (L) Arm/Elbow/Hand Redness, Blanching, and Scab  Abdomen Scar J-tube in place  Groin WDL  Scrotum/Coccyx/Buttocks Redness and Blanching  (R) Leg WDL  (L) Leg WDL  (R) Heel/Foot/Toe WDL  (L) Heel/Foot/Toe Jaundice    Devices In Places J TUBE    Interventions In Place Heel Mepilex and Sacral Mepilex    Possible Skin Injury No    Pictures Uploaded Into Epic Yes  Wound Consult Placed N/A  RN Wound Prevention Protocol Ordered No

## 2024-08-13 NOTE — THERAPY
Physical Therapy   Initial Evaluation     Patient Name: Kathy Dumont  Age:  68 y.o., Sex:  female  Medical Record #: [id number]  Today's Date: 8/13/2024     Precautions  Precautions: Fall Risk;TLSO (Thoracolumbosacral orthosis);Spinal / Back Precautions, J-tube     Assessment  Patient is 68 y.o. female with a diagnosis of Compression fracture of T6 of 40% and L1 20%    Additional factors influencing patient status / progress past medical history of prior thoracic and lumbar compression fractures, , BMI<19, anorexia , acute recurrent pancreatitis, arthritis, celiac disease, disorder of thyroid, gastroparesis, gluten intolerance.  Pt resides with her spouse in [facility]. Pt demonstrating the ability to ambulate with FWW for functional home distances. Pt educated on body mechanics and jodie/doff of TLSO. No DME needs or post acute PT needs at this time.     Patient will not be actively followed for physical therapy services at this time, however may be seen if requested by physician for 1 more visit within 30 days to address any discharge or equipment needs.     Plan    Physical Therapy Initial Treatment Plan   Duration: (P) Discharge Needs Only    DC Equipment Recommendations: (P) None  Discharge Recommendations: (P) Anticipate that the patient will have no further physical therapy needs after discharge from the hospital         Initial Contact Note    Initial Contact Note Order Received and Verified, Physical Therapy Evaluation in Progress with Full Report to Follow.   Precautions   Precautions Fall Risk;TLSO (Thoracolumbosacral orthosis);Spinal / Back Precautions    Vitals   O2 (LPM) 0   O2 Delivery Device None - Room Air   Pain 0 - 10 Group   Location Back   Location Orientation Right;Left;Lower   Pain Rating Scale (NPRS) 5   Therapist Pain Assessment During Activity;5   Prior Living Situation   Prior Services None   Housing / Facility 1 Story House   Steps Into Home 3   Steps In Home 0   Equipment Owned  Front-Wheel Walker;Single Point Cane   Lives with - Patient's Self Care Capacity Spouse   Comments spouse works FT, assist with ADLs' and IADL's as needed.   Prior Level of Functional Mobility   Bed Mobility Independent   Transfer Status Independent   Ambulation Independent   Ambulation Distance short community   Assistive Devices Used None   Stairs Independent   History of Falls   Date of Last Fall   (remote past)   Cognition    Level of Consciousness Alert   Active ROM Lower Body    Active ROM Lower Body  WDL   Strength Lower Body   Lower Body Strength  X   Gross Strength Generalized Weakness, Equal Bilaterally   Comments decreased muscle bulk   Sensation Lower Body   Lower Extremity Sensation   WDL   Lower Body Muscle Tone   Lower Body Muscle Tone  WDL   Neurological Concerns   Neurological Concerns No   Coordination Lower Body    Coordination Lower Body  WDL   Other Treatments   Other Treatments Provided education on body mechanics , sequencing of log roll and supine <> sit, jodie and salvador of TLSO, education on benefit of FWW for balance   Balance Assessment   Sitting Balance (Static) Good   Sitting Balance (Dynamic) Good   Standing Balance (Static) Fair +   Standing Balance (Dynamic) Fair   Weight Shift Sitting Fair   Weight Shift Standing Fair   Comments w/ FWW   Bed Mobility    Supine to Sit Supervised   Sit to Supine Supervised   Scooting Supervised   Rolling Supervised   Gait Analysis   Gait Level Of Assist Contact Guard Assist   Assistive Device Front Wheel Walker   Distance (Feet) 100   # of Times Distance was Traveled 1   Deviation Antalgic;Step To;Shuffled Gait;Decreased Heel Strike;Decreased Toe Off   # of Stairs Climbed Spouse can assist with steps to enter home. Feels confident    Functional Mobility   Sit to Stand Contact Guard Assist   Bed, Chair, Wheelchair Transfer Contact Guard Assist   Transfer Method Stand Step   6 Clicks Assessment - How much HELP from from another person do you currently  need... (If the patient hasn't done an activity recently, how much help from another person do you think he/she would need if he/she tried?)   Turning from your back to your side while in a flat bed without using bedrails? 3   Moving from lying on your back to sitting on the side of a flat bed without using bedrails? 3   Moving to and from a bed to a chair (including a wheelchair)? 3   Standing up from a chair using your arms (e.g., wheelchair, or bedside chair)? 3   Walking in hospital room? 3   Climbing 3-5 steps with a railing? 3   6 clicks Mobility Score 18   Education Group   Education Provided Role of Physical Therapist;Gait Training;Use of Assistive Device   Role of Physical Therapist Patient Response Patient;Eager;Acceptance;Explanation;Verbal Demonstration   Gait Training Patient Response Patient;Acceptance;Explanation;Verbal Demonstration;Action Demonstration   Physical Therapy Initial Treatment Plan    Duration Discharge Needs Only   Problem List    Problems Pain   Anticipated Discharge Equipment and Recommendations   DC Equipment Recommendations None   Discharge Recommendations Anticipate that the patient will have no further physical therapy needs after discharge from the hospital   Interdisciplinary Plan of Care Collaboration   IDT Collaboration with  Nursing   Patient Position at End of Therapy In Bed;Tray Table within Reach;Call Light within Reach;Phone within Reach   Collaboration Comments rn updated   Session Information   Date / Session Number  8/13 eval only

## 2024-08-13 NOTE — ASSESSMENT & PLAN NOTE
With temporal muscle wasting and fat wasting  Continue nocturnal tube feeds for increased calorie   Nutrition specialist following

## 2024-08-13 NOTE — ED NOTES
Pt to S631/01 via Transport. Pt with all belongings in possession. Pt is on room air and medical. Report has been given to YOUSUF Yao

## 2024-08-13 NOTE — PROGRESS NOTES
Bedside report received from Maximilian TO at this time. Patient lying in bed, denies pain when she's lying still, vital signs stable, on room air. Family at bedside. Calls approp for needs, plan for MRI today, NPO for possible neurosurgery intervention. Fall precautions in place

## 2024-08-13 NOTE — TELEPHONE ENCOUNTER
Gloria,     for Kathy called and said she is in the hospital and would like for you to know in case you need to send any meal instructions to the hospital.     Thank you!

## 2024-08-13 NOTE — CONSULTS
Neurosurgery Consult Note    Patient: Kathy Dumont MRN: [id number]    Date of Consultation: 8/13/2024    Reason for Consultation: compression fracture    Referring Physician: Dr. Yohan MD Hospital Medicine    History of Present Illness:  The patient is a 68 y.o. female presenting with acute back pain while bending over and picking green beans. She was admitted for pain control and MRIs revealed new compression fractures. Neurosurgery consulted. Patient has had compression fractures in the past. No weakness, numbness, tingling. She has known osteoporosis, takes Ca supplements. Reports not being treated for osteoporosis due to the great burden of traveling to [place] for her many other medical visits (h/o [facility] for chronic pancreatitis, J tube).    Medications:  Current Facility-Administered Medications   Medication Dose Route Frequency Provider Last Rate Last Admin    lidocaine (Asperflex) 4 % patch 1 Patch  1 Patch Transdermal Q24HR Jennifer Almanzar M.D.   1 Patch at 08/13/24 1333    Pharmacy Consult: Enteral tube insertion - review meds/change route/product selection  1 Each Other PHARMACY TO DOSE Jennifer Almanzar M.D.        pancrelipase (Lip-Prot-Amyl) (Creon 50253) [id number] units capsule 72,000 Units  3 Capsule Enteral Tube TID WITH MEALS Jennifer Almanzar M.D.        ondansetron (Zofran ODT) dispertab 4 mg  4 mg Enteral Tube Q4HRS PRN Jennifer Almanzar M.D.        oxyCODONE immediate-release (Roxicodone) tablet 2.5 mg  2.5 mg Enteral Tube Q3HRS PRN Jennifer Almanzar M.D.        Or    oxyCODONE immediate-release (Roxicodone) tablet 5 mg  5 mg Enteral Tube Q3HRS PRN Jennifer Almanzar M.D.        Or    morphine 4 MG/ML injection 2 mg  2 mg Intravenous Q3HRS PRN Jennifer Almanzar M.D.        Pharmacy Consult Request ...Pain Management Review 1 Each  1 Each Other PHARMACY TO DOSE Portillo Leblanc M.D.        ondansetron (Zofran) syringe/vial injection 4 mg  4 mg Intravenous Q4HRS PRN Portillo Leblanc M.D.   4 mg at 08/13/24  "1235    heparin injection 5,000 Units  5,000 Units Subcutaneous Q8HRS Portillo Leblanc M.D.   5,000 Units at 08/13/24 1332       Allergies:  Allergies   Allergen Reactions    Hydrocodone-Acetaminophen Rash     Rash, \"I can tolerate in small amounts\"      Loratadine-Pseudoephedrine Rash     rash    Sertraline Hcl Palpitations    Tramadol Vomiting     Fainting    Zoloft Rash     rash    Codeine Vomiting    Gluten Meal      \"violent stomach upset for 4-5 days\"       Past Medical History:  Past Medical History:   Diagnosis Date    Acute recurrent pancreatitis 08/2021    Anesthesia     PONV    Arthritis 08/2021    fingers    Celiac disease     Disorder of thyroid     hypothyroid    Gastroparesis 08/2021    Gluten intolerance     Pain stomach issues       Past Surgical History:  Past Surgical History:   Procedure Laterality Date    UT UPPER GI ENDOSCOPY,DIAGNOSIS N/A 2/2/2024    Procedure: GASTROSCOPY WITH J TUBE REVISION;  Surgeon: Davin Garces M.D.;  Location: SURGERY SAME DAY [facility];  Service: Gastroenterology    UT UPPER GI ENDOSCOPY,DIAGNOSIS N/A 2/2/2024    Procedure: GASTROSCOPY;  Surgeon: Davin Garces M.D.;  Location: SURGERY SAME DAY [facility];  Service: Gastroenterology    UT UPPER GI ENDOSCOPY,CTRL BLEED N/A 2/2/2024    Procedure: EGD, WITH CLIP PLACEMENT;  Surgeon: Davin Garces M.D.;  Location: SURGERY SAME DAY [facility];  Service: Gastroenterology    UT UPPER GI ENDOSCOPY,DIAGNOSIS N/A 1/30/2024    Procedure: GASTROSCOPY;  Surgeon: Claudia Oakes M.D.;  Location: SURGERY SAME DAY [facility];  Service: Gastroenterology    GASTROSTOMY N/A 1/30/2024    Procedure: PERCUTANEOUS ENDOSCOPIC GASTROSTOMY WITH TUBE PLACEMENT, JEJUNOSTOMY TUBE;  Surgeon: Claudia Oakes M.D.;  Location: SURGERY SAME DAY [facility];  Service: Gastroenterology    GASTROJEJUNOSTOMY N/A 05/05/2023    Procedure: MAIK-EN-Y GASTROJEJUNOSTOMY BYPASS AND EXPLORATORY LAPAORTOMY;  Surgeon: Bradly Cisneros M.D.;  Location: " SURGERY [facility];  Service: General    MN UPPER GI ENDOSCOPY,DIAGNOSIS  04/18/2022    Procedure: GASTROSCOPY - WITH PYLORUS DILATION WITH BOTOX INJECTION;  Surgeon: Rivas Cadet M.D.;  Location: [facility];  Service: Gastroenterology    MN ERCP,DIAGNOSTIC  04/18/2022    Procedure: ERCP (ENDOSCOPIC RETROGRADE CHOLANGIOPANCREATOGRAPHY);  Surgeon: Rivas Cadet M.D.;  Location: [facility];  Service: Gastroenterology    MN UPPER GI ENDOSCOPY,DIAGNOSIS N/A 08/09/2021    Procedure: GASTROSCOPY;  Surgeon: Rivas Cadet M.D.;  Location: [facility];  Service: EUS    MN ERCP,DIAGNOSTIC N/A 08/09/2021    Procedure: ERCP, DIAGNOSTIC;  Surgeon: Rivas Cadet M.D.;  Location: [facility];  Service: EUS    EGD W/ENDOSCOPIC ULTRASOUND N/A 08/09/2021    Procedure: EGD, WITH ENDOSCOPIC US - UPPER RADIAL;  Surgeon: Rivas Cadet M.D.;  Location: [facility];  Service: EUS    ORIF, HUMERUS Left 2006    KNEE ARTHROSCOPY Left 1973    CHOLECYSTECTOMY  1963    APPENDECTOMY CHILD      OTHER  5-21-23    PRIMARY C SECTION  1984, 1987, 1990       Family History:  No family history on file.    Social History:  Social History     Socioeconomic History    Marital status:      Spouse name: Not on file    Number of children: Not on file    Years of education: Not on file    Highest education level: Not on file   Occupational History    Not on file   Tobacco Use    Smoking status: Never     Passive exposure: Never    Smokeless tobacco: Never   Vaping Use    Vaping status: Never Used   Substance and Sexual Activity    Alcohol use: Never    Drug use: Not Currently     Types: Oral     Comment: cbd/thc, daily for pain - last took 5/3/2023    Sexual activity: Not on file   Other Topics Concern    Not on file   Social History Narrative    Not on file     Social Determinants of Health     Financial Resource Strain: Not on file   Food Insecurity: Not on  file   Transportation Needs: Not on file   Physical Activity: Not on file   Stress: Not on file   Social Connections: Not on file   Intimate Partner Violence: Not At Risk (8/12/2024)    Humiliation, Afraid, Rape, and Kick questionnaire     Fear of Current or Ex-Partner: No     Emotionally Abused: No     Physically Abused: No     Sexually Abused: No   Housing Stability: Not on file       Physical Examination:  Vitals:    08/13/24 1458   BP: 110/58   Pulse: 76   Resp: 18   Temp: 36.8 °C (98.2 °F)   SpO2: 95%     The patient is resting comfortably in bed in no acute distress.  Moderate thoracolumbar paraspinal tenderness and muscle spasm, no significant midline tenderness    Neurological  Awake, alert, oriented x3. CN II-XII intact.    Motor    RLE  LLE  Iliopsoas     5/5     5/5  Quadriceps     5/5     5/5   Dorsiflexion                   5/5     5/5  Eversion                        5/5     5/5   EHL                               5/5     5/5  Plantarflexion                 5/5     5/5  Hamstrings                     5/5     5/5    Sensation  Sensation to light touch intact in all sensory dermatomes in bilateral lower extremities.      Reflexes        RLE  LLE  Patella    2/4+     2/4+  Achilles   2/4+     2/4+  Clonus present bilaterally., 2 beats.      Labs:  Recent Labs     08/12/24 2036   WBC 8.1   RBC 3.90*   HEMOGLOBIN 10.3*   HEMATOCRIT 31.9*   MCV 81.8   MCH 26.4*   MCHC 32.3   RDW 49.0   PLATELETCT 422   MPV 9.0     Recent Labs     08/12/24 2036   SODIUM 134*   POTASSIUM 4.1   CHLORIDE 98   CO2 25   GLUCOSE 97   BUN 21   CREATININE 0.45*   CALCIUM 8.5               Intake/Output                         08/12/24 0700 - 08/13/24 0659 08/13/24 0700 - 08/14/24 0659     0700-1859 1900-0659 Total 0700-1859 1900-0659 Total                 Intake    Total Intake -- -- -- -- -- --       Output    Urine  --  100 100  --  -- --    Number of Times Voided -- 1 x 1 x 1 x -- 1 x    Urine Void (mL) -- 100 100 -- -- --     Total Output -- 100 100 -- -- --       Net I/O     -- -100 -100 -- -- --            Imaging:  MRI thoracic with and without contrast dated 8/13/2024 was independently reviewed in detail,  and my interpretation is as follows.  Acute/subacute compression fracture T6 vertebral body. This fracture is amenable for vertebral augmentation.  Chronic compression deformities at T9, T10 and T11 vertebral bodies.    MRI lumbar with and without contrast dated 8/13/2024 was independently reviewed in detail,  and my interpretation is as follows. 1.  Acute/subacute compression fracture along the superior endplate of L1 vertebral body. There is minimal posterior retropulsion without canal compromise. This is amenable for vertebral augmentation.  2.  Subacute/chronic compression fractures of bilateral sacral area.  3.  Grade 1 spondylolisthesis of L5 on S1 with bilateral L5 spondylolysis.  4.  Moderate bilateral L5 neural foraminal stenosis with nerve root flattening.    Assessment and Plan:    Kathy Dumont is a 68 y.o. female presenting with acute/subacute L1 and T6 compression fractures, neurologically intact. Has known osteoporosis.    Chemical prophylactic DVT therapy: Yes  Start date/time: 8/13/24    Recommendations:  - No acute neurosurgical intervention at this time.  - TLSO brace for comfort when up and out of bed  - Will arrange for neurosurgery outpt f/u in 6 weeks with Xrays  - Advised against heavy lifting given multiple compression fractures and known osteoporosis  - recommended discussion of referral for osteoporosis treatment; patient declined at this time. Recommended she discuss this further with her [facility] nutritionist as patient does not have PCP where she lives.    Thank you for this consult. Please call with questions.    Ana Cristina Bermudez M.D.  [facility]  [address] [place] [address]  [phone]    A total of 45 minutes were spent in the evaluation, examination, coordination  of care, review of labs and imaging of this patient. I spent >50% of time face-to-face on patient counseling.

## 2024-08-13 NOTE — ED NOTES
Med rec updated and complete. Allergies reviewed.    Pt confirmed name and date of birth.    Pt denies outpatient antibiotic use in last 30 days.    Pt denies anticoagulant and antiplatelet medications.    Pt stated that she takes her Oxycodone (CR) as a PRN not as a scheduled medication.      No levothyroxine > 1 year.      Pt takes ALL MEDICATIONS via oral not through the J-tube.      [facility] [phone]

## 2024-08-14 ENCOUNTER — PHARMACY VISIT (OUTPATIENT)
Dept: PHARMACY | Facility: MEDICAL CENTER | Age: 69
End: 2024-08-14
Payer: COMMERCIAL

## 2024-08-14 VITALS
BODY MASS INDEX: 14.35 KG/M2 | OXYGEN SATURATION: 98 % | RESPIRATION RATE: 18 BRPM | HEIGHT: 62 IN | TEMPERATURE: 97.2 F | DIASTOLIC BLOOD PRESSURE: 47 MMHG | HEART RATE: 85 BPM | WEIGHT: 78 LBS | SYSTOLIC BLOOD PRESSURE: 106 MMHG

## 2024-08-14 LAB
BASOPHILS # BLD AUTO: 0.4 % (ref 0–1.8)
BASOPHILS # BLD: 0.02 K/UL (ref 0–0.12)
CRP SERPL HS-MCNC: 1.19 MG/DL (ref 0–0.75)
EOSINOPHIL # BLD AUTO: 0.11 K/UL (ref 0–0.51)
EOSINOPHIL NFR BLD: 2 % (ref 0–6.9)
ERYTHROCYTE [DISTWIDTH] IN BLOOD BY AUTOMATED COUNT: 49.8 FL (ref 35.9–50)
HCT VFR BLD AUTO: 32.5 % (ref 37–47)
HGB BLD-MCNC: 10.4 G/DL (ref 12–16)
IMM GRANULOCYTES # BLD AUTO: 0.03 K/UL (ref 0–0.11)
IMM GRANULOCYTES NFR BLD AUTO: 0.6 % (ref 0–0.9)
LYMPHOCYTES # BLD AUTO: 1.33 K/UL (ref 1–4.8)
LYMPHOCYTES NFR BLD: 24.8 % (ref 22–41)
MCH RBC QN AUTO: 26.9 PG (ref 27–33)
MCHC RBC AUTO-ENTMCNC: 32 G/DL (ref 32.2–35.5)
MCV RBC AUTO: 84 FL (ref 81.4–97.8)
MONOCYTES # BLD AUTO: 0.35 K/UL (ref 0–0.85)
MONOCYTES NFR BLD AUTO: 6.5 % (ref 0–13.4)
NEUTROPHILS # BLD AUTO: 3.53 K/UL (ref 1.82–7.42)
NEUTROPHILS NFR BLD: 65.7 % (ref 44–72)
NRBC # BLD AUTO: 0 K/UL
NRBC BLD-RTO: 0 /100 WBC (ref 0–0.2)
PLATELET # BLD AUTO: 395 K/UL (ref 164–446)
PMV BLD AUTO: 8.8 FL (ref 9–12.9)
PREALB SERPL-MCNC: 17.1 MG/DL (ref 18–38)
RBC # BLD AUTO: 3.87 M/UL (ref 4.2–5.4)
WBC # BLD AUTO: 5.4 K/UL (ref 4.8–10.8)

## 2024-08-14 PROCEDURE — 99239 HOSP IP/OBS DSCHRG MGMT >30: CPT | Performed by: HOSPITALIST

## 2024-08-14 PROCEDURE — 86140 C-REACTIVE PROTEIN: CPT

## 2024-08-14 PROCEDURE — 85025 COMPLETE CBC W/AUTO DIFF WBC: CPT

## 2024-08-14 PROCEDURE — 84134 ASSAY OF PREALBUMIN: CPT

## 2024-08-14 PROCEDURE — A9270 NON-COVERED ITEM OR SERVICE: HCPCS | Performed by: HOSPITALIST

## 2024-08-14 PROCEDURE — RXMED WILLOW AMBULATORY MEDICATION CHARGE: Performed by: HOSPITALIST

## 2024-08-14 PROCEDURE — 700111 HCHG RX REV CODE 636 W/ 250 OVERRIDE (IP): Performed by: STUDENT IN AN ORGANIZED HEALTH CARE EDUCATION/TRAINING PROGRAM

## 2024-08-14 PROCEDURE — 700102 HCHG RX REV CODE 250 W/ 637 OVERRIDE(OP): Performed by: HOSPITALIST

## 2024-08-14 RX ORDER — POLYETHYLENE GLYCOL 3350 17 G/17G
17 POWDER, FOR SOLUTION ORAL DAILY
Qty: 3 EACH | Refills: 0 | Status: SHIPPED | OUTPATIENT
Start: 2024-08-14

## 2024-08-14 RX ORDER — LIDOCAINE 4 G/G
1 PATCH TOPICAL EVERY 24 HOURS
Qty: 30 PATCH | Refills: 0 | Status: SHIPPED | OUTPATIENT
Start: 2024-08-14

## 2024-08-14 RX ORDER — BISACODYL 10 MG
10 SUPPOSITORY, RECTAL RECTAL ONCE
Qty: 5 SUPPOSITORY | Refills: 0 | Status: SHIPPED | OUTPATIENT
Start: 2024-08-14 | End: 2024-08-14

## 2024-08-14 RX ORDER — OXYCODONE HYDROCHLORIDE 5 MG/1
5 TABLET ORAL EVERY 4 HOURS PRN
Qty: 30 TABLET | Refills: 0 | Status: SHIPPED | OUTPATIENT
Start: 2024-08-14 | End: 2024-08-19

## 2024-08-14 RX ORDER — BISACODYL 10 MG
10 SUPPOSITORY, RECTAL RECTAL ONCE
Status: DISCONTINUED | OUTPATIENT
Start: 2024-08-14 | End: 2024-08-14 | Stop reason: HOSPADM

## 2024-08-14 RX ADMIN — OXYCODONE HYDROCHLORIDE 5 MG: 5 TABLET ORAL at 04:28

## 2024-08-14 RX ADMIN — HEPARIN SODIUM 5000 UNITS: 5000 INJECTION, SOLUTION INTRAVENOUS; SUBCUTANEOUS at 05:26

## 2024-08-14 RX ADMIN — PANCRELIPASE 72000 UNITS: 120000; 24000; 76000 CAPSULE, DELAYED RELEASE PELLETS ORAL at 11:52

## 2024-08-14 RX ADMIN — OXYCODONE HYDROCHLORIDE 5 MG: 5 TABLET ORAL at 07:33

## 2024-08-14 RX ADMIN — PANCRELIPASE 72000 UNITS: 120000; 24000; 76000 CAPSULE, DELAYED RELEASE PELLETS ORAL at 09:01

## 2024-08-14 ASSESSMENT — PAIN DESCRIPTION - PAIN TYPE: TYPE: ACUTE PAIN

## 2024-08-14 NOTE — PROGRESS NOTES
Received bedside report from previous RN, patient is alert and oriented x4. On RA. J-tube CDI and infusing well with pivot 1.5 at 40 ml/h at this time. Fall precautions in place and call light within reach. All other needs met at this time.

## 2024-08-14 NOTE — PROGRESS NOTES
Hourly rounds performed. OOB to the bathroom using FWW with TLSO brace tolerated well. Patient reports back pain 9/10, PRN for pain given. Fall precautions continued and call light within reach. All other needs met at this time.

## 2024-08-14 NOTE — CARE PLAN
The patient is Stable - Low risk of patient condition declining or worsening    Shift Goals  Clinical Goals: pain management  Patient Goals: rest  Family Goals: uriah    Progress made toward(s) clinical / shift goals: updated patient on POC and verbalized understanding. Medicated per MAR and tolerated well. OOB to the bathroom using FWW tolerated well with TLSO brace in place. Needs attended.     Patient is not progressing towards the following goals:    Problem: Pain - Standard  Goal: Alleviation of pain or a reduction in pain to the patient’s comfort goal  Description: Target End Date:  Prior to discharge or change in level of care    Document on Vitals flowsheet    1.  Document pain using the appropriate pain scale per order or unit policy  2.  Educate and implement non-pharmacologic comfort measures (i.e. relaxation, distraction, massage, cold/heat therapy, etc.)  3.  Pain management medications as ordered  4.  Reassess pain after pain med administration per policy  5.  If opiods administered assess patient's response to pain medication is appropriate per POSS sedation scale  6.  Follow pain management plan developed in collaboration with patient and interdisciplinary team (including palliative care or pain specialists if applicable)  Outcome: Not Progressing     Problem: Knowledge Deficit - Standard  Goal: Patient and family/care givers will demonstrate understanding of plan of care, disease process/condition, diagnostic tests and medications  Description: Target End Date:  1-3 days or as soon as patient condition allows    Document in Patient Education    1.  Patient and family/caregiver oriented to unit, equipment, visitation policy and means for communicating concern  2.  Complete/review Learning Assessment  3.  Assess knowledge level of disease process/condition, treatment plan, diagnostic tests and medications  4.  Explain disease process/condition, treatment plan, diagnostic tests and medications  Outcome:  Not Progressing

## 2024-08-14 NOTE — PROGRESS NOTES
Patient being discharged via DCL. Pt educated on discharge instructions and new prescriptions, verbalized understanding. Follow up appointment to be made with PCP. PIV removed on floor per pt. Patient going home via car with family.

## 2024-08-14 NOTE — DISCHARGE SUMMARY
Discharge Summary    CHIEF COMPLAINT ON ADMISSION  Chief Complaint   Patient presents with    Back Pain     Pt has hx of old compression fx. Pt was out Friday night in her garden picking green beans when she felt increased back pain. Pt was unable to walk today due to backpain       Reason for Admission  ems     Admission Date  8/12/2024    CODE STATUS  Full Code    HPI & HOSPITAL COURSE  Kathy Dumont is a 68 y.o. female who presented 8/12/2024 with intractable back pain.  Patient is a known past medical history of prior thoracic and lumbar compression fractures.  Patient states that she was gardening Friday night where she was picking beans, and she subsequently felt a sharp increase in pain.  She denies any trauma or falls.  Her since then, the patient has had worsening back pain, and has difficulty ambulating.  Repeat imaging was obtained showing acute versus chronic fractures in both her lumbar and thoracic spine.  Unknown chronicity.  She denies any loss of strength or sensation in her bilateral upper or lower extremities.  Denies any bowel or stool incontinence.  Patient was\\ admitted for pain management, MRI, and neurosurgical evaluation. She was found to have acute compression fractures at L1 and T6. She was evaluated by neurosurgery who recommended 6 weeks with a TLSO brace and will follow up in clinic.   She was cleared by PT and OT and will be discharged with home health. There was some confusion regarding if she has a Pcp, it turns out that she does have one and they are available via zoom. She agrees to follow up with her pcp and to include follow up and treatment of her severe malnutrition, chronic anemia and osteoporosis. She was seen by a nutrition specialist here and will continue her chronic nocturnal j tube feedings. She has chronic constipation so she was discharged on an increased bowel regimen with instructions to follow this up with her pcp.     She agrees to outpatient follow  up and to return to the ER if needed.     Therefore, she is discharged in fair and stable condition to home with organized home healthcare and close outpatient follow-up.    The patient met 2-midnight criteria for an inpatient stay at the time of discharge.    Discharge Date  8/14/24    FOLLOW UP ITEMS POST DISCHARGE  Pcp  Dr. Bermudez neurosurgery    DISCHARGE DIAGNOSES  Principal Problem:    Compression fracture of body of thoracic vertebra (HCC) (POA: Unknown)  Active Problems:    Anorexia (POA: Yes)    Delayed gastric emptying (POA: Yes)    Severe protein-calorie malnutrition (HCC) (POA: Yes)    Malnutrition compromising bodily function (HCC) (POA: Yes)    Intractable back pain (POA: Yes)    Anemia (POA: Unknown)  Resolved Problems:    * No resolved hospital problems. *      FOLLOW UP  Future Appointments   Date Time Provider Department Center   8/22/2024 10:00 AM [address] [address]  [phone]    Go on 8/22/2024  Aug 22, 2024 10:00 AM  Established Patient with [facility] (Formerly [facility])[address] [place] [id number]  [phone]    ABUNDIO CampbellAShashank-DAYNA  [facility] [place] [id number] [phone]    Schedule an appointment as soon as possible for a visit on 8/21/2024        MEDICATIONS ON DISCHARGE     Medication List        START taking these medications        Instructions   bisacodyl 10 MG Supp  Commonly known as: Dulcolax   Insert 1 Suppository into the rectum one time for 1 dose.  Dose: 10 mg     lidocaine 4 % Ptch  Commonly known as: Asperflex   Place 1 Patch on the skin every 24 hours.  Dose: 1 Patch     polyethylene glycol/lytes Pack  Commonly known as: Miralax   Take 1 Packet by mouth every day.  Dose: 17 g            CHANGE how you take these medications        Instructions   * oxyCODONE CR 40 MG T12a tablet  What changed: Another medication with the same name was  "added. Make sure you understand how and when to take each.  Commonly known as: OxyCONTIN   Take 40 mg by mouth every 12 hours as needed (AUTE PAIN).  Dose: 40 mg     * oxyCODONE immediate-release 5 MG Tabs  What changed: You were already taking a medication with the same name, and this prescription was added. Make sure you understand how and when to take each.  Commonly known as: Roxicodone   1 Tablet by Enteral Tube route every four hours as needed for Severe Pain for up to 5 days.  Dose: 5 mg           * This list has 2 medication(s) that are the same as other medications prescribed for you. Read the directions carefully, and ask your doctor or other care provider to review them with you.                CONTINUE taking these medications        Instructions   Creon [id number] units Cpep  Generic drug: Pancrelipase (Lip-Prot-Amyl)   Take 2 Capsules by mouth 3 times a day with meals. 1 with snacks  Dose: 2 Capsule     ondansetron 4 MG Tbdp  Commonly known as: Zofran ODT   Take 4 mg by mouth every 6 hours as needed for Nausea/Vomiting.  Dose: 4 mg              Allergies  Allergies   Allergen Reactions    Hydrocodone-Acetaminophen Rash     Rash, \"I can tolerate in small amounts\"      Loratadine-Pseudoephedrine Rash     rash    Sertraline Hcl Palpitations    Tramadol Vomiting     Fainting    Zoloft Rash     rash    Codeine Vomiting    Gluten Meal      \"violent stomach upset for 4-5 days\"       DIET  Orders Placed This Encounter   Procedures    Diet Order Diet: Regular     Standing Status:   Standing     Number of Occurrences:   1     Order Specific Question:   Diet:     Answer:   Regular [1]    Diet: Diet Tube Feed; Formula: Pivot; Pivot: Pivot 1.5 RTH; Goal Rate (mL/Hour): 75; Tube Feed Time Unit: Hours/Night     Start nocturnal TF Pivot 1.5 at 40 ml/hr for 2-3 hours the first night and then if tolerated advance to final goal rate 75 ml/hr x 11 hours nocturnal (1889-0817). All subsequent nights run TF at goal rate    "  Standing Status:   Standing     Number of Occurrences:   1     Order Specific Question:   Diet     Answer:   Diet Tube Feed [35]     Order Specific Question:   Formula:     Answer:   Pivot     Order Specific Question:   Pivot:     Answer:   Pivot 1.5 RTH     Order Specific Question:   Goal Rate (mL/Hour)     Answer:   75     Order Specific Question:   Route     Answer:   Jtube     Order Specific Question:   Tube Feed Time Unit     Answer:   Hours/Night       ACTIVITY  As tolerated.  Weight bearing as tolerated    CONSULTATIONS  [facility]    PROCEDURES  MR-THORACIC SPINE-WITH & W/O   Final Result      1.  Acute/subacute compression fracture T6 vertebral body. This fracture is amenable for vertebral augmentation.   2.  Chronic compression deformities at T9, T10 and T11 vertebral bodies.      MR-LUMBAR SPINE-WITH & W/O   Final Result      1.  Acute/subacute compression fracture along the superior endplate of L1 vertebral body. There is minimal posterior retropulsion without canal compromise. This is amenable for vertebral augmentation.   2.  Subacute/chronic compression fractures of bilateral sacral area.   3.  Grade 1 spondylolisthesis of L5 on S1 with bilateral L5 spondylolysis.   4.  Moderate bilateral L5 neural foraminal stenosis with nerve root flattening.      OUTSIDE IMAGES-CT LUMBAR SPINE   Final Result      OUTSIDE IMAGES-CT THORACIC SPINE   Final Result      OUTSIDE IMAGES-CT ABDOMEN /PELVIS   Final Result            LABORATORY  Lab Results   Component Value Date    SODIUM 134 (L) 08/12/2024    POTASSIUM 4.1 08/12/2024    CHLORIDE 98 08/12/2024    CO2 25 08/12/2024    GLUCOSE 97 08/12/2024    BUN 21 08/12/2024    CREATININE 0.45 (L) 08/12/2024        Lab Results   Component Value Date    WBC 5.4 08/14/2024    HEMOGLOBIN 10.4 (L) 08/14/2024    HEMATOCRIT 32.5 (L) 08/14/2024    PLATELETCT 395 08/14/2024        Total time of the discharge process exceeds 45 minutes.

## 2024-08-14 NOTE — PROGRESS NOTES
"Pt to discharge lounge. Pt picked up by transport via hospital bed. Spouse at bedside. Hard script given to patient. IV dc. Pt refused laxative and verbalized that \"we are 2 hours away and we don't want any accidents, I take those medications at home\". Pt left via wheelchair with TLSO brace on.   "

## 2024-08-22 ENCOUNTER — APPOINTMENT (OUTPATIENT)
Dept: INTERNAL MEDICINE | Facility: OTHER | Age: 69
End: 2024-08-22
Payer: MEDICARE

## 2024-09-18 ENCOUNTER — NON-PROVIDER VISIT (OUTPATIENT)
Dept: INTERNAL MEDICINE | Facility: OTHER | Age: 69
End: 2024-09-18
Payer: MEDICARE

## 2024-09-18 DIAGNOSIS — Z98.84 STATUS POST BARIATRIC SURGERY: ICD-10-CM

## 2024-09-18 DIAGNOSIS — E43 SEVERE MALNUTRITION (HCC): ICD-10-CM

## 2024-09-18 DIAGNOSIS — K30 DELAYED GASTRIC EMPTYING: ICD-10-CM

## 2024-09-18 DIAGNOSIS — R63.6 UNDERWEIGHT: ICD-10-CM

## 2024-09-18 PROCEDURE — 97803 MED NUTRITION INDIV SUBSEQ: CPT | Performed by: DIETITIAN, REGISTERED

## 2024-09-18 NOTE — PROGRESS NOTES
Kathy is here for FU with RD for support with protein calorie malnutrition, being underweight with a BMI less than 19 and history of anorexia, severe protein-calorie malnutrition and history of Sinai-en-Y gastric bypass surgery along with gastroparesis     Fractured her back in multiple places and slowly recovering  Uses a back brace and a cane to get around  Able to sit up some of the day now  In pain still - uses pain meds  Uses the back pain for support    Weight is up today at 80#  which is up 4# from July     Seeing neurologist to see if a fusion is an option to help her manage the pain   Going to get a DEXA scan next week     Wants to go down a bottle with the tube feeds and wants to eat orally instead   Currently at 3.5 bottles per night  Getting jealous about not being able to eat as much as she wants  Doesn't even like her dessert   Had a bout of pancreatitis a week ago and for a few days they did 2.5 bottles vs 3.5 to allow some rest and ease and then they went back up to 3.5 bottles     Kathy expressed desire and motivation to work on increasing her oral food intake to support weaning off the tube feeds. She seemed to be in a position different than before where she has realized that she wants to be able to do more things, is feeling better with increased feeds and continual weight gain, that she feels ready to take this one. She did get emotional today as we worked through a meal plan to support covering 1 bottle of calories with oral food with her fear of her past assumptions with her anorexia. We talked through a lot of that today and created a plan. Set goals; rtc with RD in 1 month    Time spent: 60 minutes

## 2024-09-18 NOTE — PATIENT INSTRUCTIONS
Goals:  Be mindful of your hydration. Utilize the possible of fruit and peanut butter popsicles  Breakfast: Bigger muffin with brown sugar butter spread with 1 tsp, add 1/2 hard boiled egg, prunes x1, coffee with creamer and MCT oil (1/2 tsp) = 250 calories   Lunch: 4 slices of peaches with heavy syrup, 6 crackers, 2 oz juice, 1/2 pudding = 250 calories   Snack: popsicle and cookie = 80 calories   Dinner: 2 tbsp rice with butter and fish stick with tartar sauce, 1 tbsp of veggie peas, and 5-6 candies/m&m's = 150 calories    Snack: 1/2 cup scoop of ice cream with chocolate syrup and vanilla wafers x1 = 200 calories

## 2024-09-24 VITALS — WEIGHT: 80 LBS | BODY MASS INDEX: 14.63 KG/M2

## 2024-09-24 ASSESSMENT — FIBROSIS 4 INDEX: FIB4 SCORE: 0.95

## 2024-10-24 ENCOUNTER — NON-PROVIDER VISIT (OUTPATIENT)
Dept: INTERNAL MEDICINE | Facility: OTHER | Age: 69
End: 2024-10-24
Payer: MEDICARE

## 2024-10-24 VITALS — WEIGHT: 82.6 LBS | BODY MASS INDEX: 15.11 KG/M2

## 2024-10-24 DIAGNOSIS — E43 SEVERE PROTEIN-CALORIE MALNUTRITION (HCC): ICD-10-CM

## 2024-10-24 DIAGNOSIS — K30 DELAYED GASTRIC EMPTYING: ICD-10-CM

## 2024-10-24 DIAGNOSIS — R63.6 UNDERWEIGHT: ICD-10-CM

## 2024-10-24 DIAGNOSIS — Z98.84 STATUS POST BARIATRIC SURGERY: ICD-10-CM

## 2024-10-24 PROCEDURE — 97803 MED NUTRITION INDIV SUBSEQ: CPT | Performed by: DIETITIAN, REGISTERED

## 2024-10-24 ASSESSMENT — FIBROSIS 4 INDEX: FIB4 SCORE: 0.95

## 2024-10-24 NOTE — PATIENT INSTRUCTIONS
Goals:  Switch to the Activia yogurt vs the greek yogurt since you are not tolerating the greek yogurt   Try taking a B complex vitamin vs just a B12 vitamin   Try having a whole sausage link at breakfast (squeeze out the excess fat)  Add in some soups for lunch and crackers

## 2024-10-24 NOTE — PROGRESS NOTES
Kathy is here for FU with RD for support with protein calorie malnutrition, being underweight with a BMI less than 19 and history of anorexia, severe protein-calorie malnutrition and history of Sinai-en-Y gastric bypass surgery along with gastroparesis     She is up 2.6 lbs from last visit     They did cut back to 2.5 cans to allow for more oral food  Still dealing with a bitter taste that causes food to not taste good   She went to Urgent Care and they recommended she start pepcid but it didn't help  Saw GI and they loosened the stoma and it didn't help  Dentis wants mouth rinse and tablets to see if there is bateria to see if that is the cause   Started zinc and vitamin b12     I reocmmend a b complex    GI also recommended she started the greek yogurt   Causes her diarrhea   Having an oz or two per day in between meals vs pudding   Not taking lactase       She reports eating according to her plan   Not eating as many eggs due to her chickens slowing down   IF she doesn't have the egg she'll do peanut butter or yogurt   seems very proud of her too     Bigger meals are breakfast and lunch   Dinner can lead to great bitter taste and ends up not as hungry   Bloating can really increase at night too     Still doing her ice cream and cookie     TFs start at 5pm at 80 mL/hr and finished by 12:30a/1am         work to support her with slowing down her weight gain once she gets to 85# and support her with feeling more comfortable with the increase in weight  I do recommend that she try a b complex, not just b12, to see if that helps her taste changes at all.  Helped her with finding opportunities to increase her calorie consumption through food.   Set goals; rtc with RD in 1 month    Time spent: 60 minutes

## 2024-11-04 ENCOUNTER — APPOINTMENT (OUTPATIENT)
Dept: INTERNAL MEDICINE | Facility: OTHER | Age: 69
End: 2024-11-04
Payer: MEDICARE

## 2024-12-06 ENCOUNTER — NON-PROVIDER VISIT (OUTPATIENT)
Dept: INTERNAL MEDICINE | Facility: OTHER | Age: 69
End: 2024-12-06
Payer: MEDICARE

## 2024-12-06 VITALS — WEIGHT: 81.4 LBS | BODY MASS INDEX: 14.89 KG/M2

## 2024-12-06 DIAGNOSIS — K31.84 GASTROPARESIS: ICD-10-CM

## 2024-12-06 DIAGNOSIS — K86.1 IDIOPATHIC CHRONIC PANCREATITIS (HCC): ICD-10-CM

## 2024-12-06 DIAGNOSIS — Z98.84 HISTORY OF ROUX-EN-Y GASTRIC BYPASS: ICD-10-CM

## 2024-12-06 DIAGNOSIS — F50.019 ANOREXIA NERVOSA, RESTRICTING TYPE, UNSPECIFIED SEVERITY: ICD-10-CM

## 2024-12-06 DIAGNOSIS — E43 SEVERE PROTEIN-CALORIE MALNUTRITION (HCC): ICD-10-CM

## 2024-12-06 PROCEDURE — 97803 MED NUTRITION INDIV SUBSEQ: CPT | Performed by: DIETITIAN, REGISTERED

## 2024-12-06 ASSESSMENT — FIBROSIS 4 INDEX: FIB4 SCORE: 0.96

## 2024-12-06 NOTE — PATIENT INSTRUCTIONS
Goals:  Continue to do what you can. Focus on getting in what you can at meals and doing 1 bottle, if possible, of the Peptamen 1.5   I will reach out to Jina/Dr. Oakes to see if there is any way they can help out  You can Dr. BRUNER to see where you can get

## 2024-12-06 NOTE — PROGRESS NOTES
Kathy is here for FU with RD for support with protein calorie malnutrition, being underweight with a BMI less than 19 and history of anorexia, severe protein-calorie malnutrition and history of Sinai-en-Y gastric bypass surgery along with gastroparesis     She is still leaking bile from her J Tube - having surgery on 12/31 and going to replace the feeding tube     No longer needs back surgery - advised against it with her osteoporosis     Dr. Agosto orgiinally told her to get the tube replaced at 6 months but then was told that does not need to happen    But this last month she has been having continual issues with bitter taste   Has not tolerated her feeding tube     Has been going on for 3 months     Has been really miserable   Weight is down from last appointment which is no surprise due to the challenges she has been dealing   Some days she can have 1/2 cup of bile leaking  Trying to drink sprite to keep her stomach calm but that did not taste good  Tried coffee with creamer and that was worse   Can only tolerate salty foods   Tried gargling with salt water before eating   Currently only able to do 1 bottle of her tube feeds (peptamen 1.5)   Dealing with constipation   Has had to do 3 iron infusions with 2 more to go   Trying to do prunes/prune juice     Food intake by mouth:   Doing her best to get her muffin, prune, coffee for breakfast down  Lunch will be 1/2 an egg or fruit, 3 crackers   Snack - juice  Dinner - 1 tbsp of rice or mashed potatoes     By lunch/dinner she is in a lot of pain that she cannot eat much by mouth  Can barely tolerate the 1 bottle of the tube feed     Worked with Kathy on doing what she can; keeping some consistency with her food intake as best she can and working to get in at least one bottle of tube feed per day as that is all she can tolerate right now.  RTC with RD in 1 month    Time spent: 30 minutes

## 2024-12-10 ENCOUNTER — PATIENT MESSAGE (OUTPATIENT)
Dept: INTERNAL MEDICINE | Facility: OTHER | Age: 69
End: 2024-12-10
Payer: MEDICARE

## 2025-01-24 NOTE — PROGRESS NOTES
Subjective:   2/4/2025  8:11 AM  Primary care physician: Zoey Thurman P.A.-C.  Referring Provider: Femi Gutierrez M.D.     Chief Complaint:   Chief Complaint   Patient presents with    Follow-Up     HX MAIK EN Y   MALNOURISHED  CREON   FEEDING TUBE        Diagnosis:   1. Chronic pancreatitis, unspecified pancreatitis type (HCC)        2. Underweight due to inadequate caloric intake        3. History of Maik-en-Y gastric bypass        4. Pancreatic insufficiency        5. Malnutrition compromising bodily function (HCC)        6. Delayed gastric emptying          History of presenting illness:    Kathy Dumont is a pleasant 67 y.o. female  female with hx of anorexia nervosa, celiac disease(?), idiopathic gastroparesis, and idiopathic recurrent pancreatitis. She has a long GI history and was previously seen at [facility] and at  in [place], [place]. Her latest weight is 29 kg, which is a slight decrease from December. She has previous attempted numerous medications including Reglan, Creon, Linzess, Domporidon. Her diet consists mostly of jello, baby food, ice cream, Boost drinks. She previously tried working with a nurtitionist - but had no clear results.     GASTRIC EMPTYING STUDY on 7/20/21 noted significant retained gastric activity at 4 hours indicating delayed gastric emptying.     EGD on 8/9/21 noted   EGD FINDINGS:  1.  Esophagus:  Unremarkable mucosa.  2.  Long and J-shaped stomach, otherwise unremarkable mucosa.  3.  Mildly stenotic pylorus, successfully dilated to 20 mm with balloon.  4.  Duodenum:  Unremarkable mucosa.  Possible extrinsic compression of the second portion.     ENDOSCOPIC ULTRASOUND FINDINGS:  1.  Celiac axis, no adenopathy.  2.  Pancreatic body and tail, normal parenchyma.  3.  Pancreatic duct, normal course and caliber.  4.  Gallbladder, surgically absent.  5.  Biliary ampulla, unremarkable.  6.  Common bile duct, generous caliber  down to the level of the ampulla.  Normal course.  Small subtle nonshadowing filling defect suspicious of stone versus sludge ball.  7.  Head of pancreas, normal parenchyma and normal dorsal ventral transition.     ERCP FINDINGS:  1.  Biliary ampulla, small, but otherwise unremarkable appearance.  Stenotic to wire and cannula passage.  2.  Pancreatic duct, neither injected nor cannulated.  3.  Common bile duct, normal course.  Generous caliber.  Small distal filling defect consistent with small stone.     EGD by Dr Cadet on 4/18/23 noted unremarkable mucosa, and included intersphincteric injection of Botox - 100U divided 4 quadrants. Hydraulic dilation of pylorus 18-19-20 mm balloon 10-11-12 mm hydraulic dilation of biliary ampulla.     Updated NM GASTRIC EMPTYING study on 4/27/23 noted gastric emptying halftime measured at 204 minutes consistent with delayed gastric emptying. This previously measured approximately 223.5 minutes     She is here today for evaluation for what appears to be chronic gastric outlet obstruction from hypertrophic pylorus, gastroparesis, history of anorexia, and severe malnutrition.  The patient's BMI is 11.21 and she only weighs 29 kg.  This has occurred over the last 4 to 5 years.  She did have a history of anorexia but that resolved over 12 years ago.  The patient appears very cachectic.  She is alert and oriented x3 and very pleasant.  The patient has been scope by gastroenterology multiple times and has had multiple injections of Botox into the pylorus with pyloric dilations.  She states that last for about 2 weeks and then she can eat again.  She is presently on liquids only and continues to lose weight.  We did send her for an updated gastric emptying study and it is positive with delayed of 204 minutes.  She is not taking any narcotics or any type of motility inhibitor.  She is here with her  discuss neck steps.  We have no imaging.  What is also unusual as the patient  has suffered multiple bouts of pancreatitis.  She has had some common duct stones that were extracted 2 to 3 years ago and ever since then she has had intermittent pancreatitis.  We have no recent imaging of her pancreas. She has no family history of malignancies, she is never had a malignancy.  She had an open cholecystectomy when she was 11 years old.  It is unclear the reason why.  She is here with her  to discuss what options she has. I have reviewed the notes from GI, and other available records.     Update 5/24/23  She subsequently underwent an Sinai-en-Y gastrojejunostomy bypass on May 5, 2023.  She presented for a 2-week postoperative examination and staple removal. She informed us that she did the surgery has helped and she is able to eat better. She is having multiple bowel movements in a day which are soft  She used to not be able to tolerate food prior to her surgery.  She use to be constipated for up to 7 to 10 days. She had presented to the emergency room on the 15th for swelling.  She was placed on a diuretic discharged home.  She informs me that her swelling has improved significantly.  Has lost 1 pound however that is being on diuretics and also starting to eat more small and frequent meals.  She informs me she was able to take a bite of spaghetti. She has expressed immense gratitude to our team, and Dr. Gutierrez for referring him to us and Dr. Garcia's NP for referring to Dr. Gutierrez.     Update 6/13/23  She is here today for a four week post operative examination.  This is the patient's last postop visit.  She actually has increased the volume of food according to her  and herself.  She intermittently has some nausea but is managing that with Zofran that she has.  Other than that she has no complaints.     Update 8/8/23  Patient admitted from ED on 7/6/23 with abdominal pain, diarrhea, poor PO intake, and failure to thrive. Sent from OSH with concern for bowel perforation. Discharged home  on 7/11/23.     CT ABDOMEN on 7/10/23 noted changes of gastric bypass, prior cholecystectomy with pneumobilia as before. Small BILATERAL pleural effusions, new since recent outside study. Ascites, increased since the recent outside study. RIGHT upper and LEFT lower quadrant abdominal wall gas, possibly related to medication administration or other intervention.      She is here today for follow-up status post admission to the hospital back in early July. The patient had severe diarrhea and severe abdominal pain resulting in nausea and vomiting.  In the hospital the CT scan was read as free air and possible perforation but the patient did not have a perforation.  She was just having what appears to be severe pancreatic exocrine insufficiency and uncontrollable diarrhea and malabsorption problems.  This was corrected but unfortunately she was only given a prescription for 6000 lipase units to take with each meal 1 to 2 tablets.  This is inadequate for actual full absorption.  She continued to lose weight.  I have personally reviewed the patient's CT scan from July 10, 2023.  She is here with her  to discuss neck steps     Update 9/5/23  She had been started on Creon. In follow-up, the patient states that the pancreatic enzymes seem to immediately work for her.  She was having formed stools and tolerating her diet.  Unfortunately over the last several days she developed severe abdominal cramping with severe diarrhea. Even though she is taking the enzymes.  She is also lost weight from 64 pounds to 60 pounds since her last visit.  She states she was doing well until recently she started having abdominal pain and cramping in early satiety.  She was having trouble eating. She would stop eating due to feeling full and bloated. She is here with her  to discuss next steps.     Update 12/12/23  DX SMALL BOWEL FOLLOW THROUGH on 9/13/23 noted no evidence of stricture or delay in passage of contrast across the  anastomosis. Normal small bowel follow-through with contrast reaching the colon within 90 minutes.     She is here for follow-up status post upper GI and small bowel follow-through.  The patient was complaining she is having difficulty eating again.  She did undergo a internal drainage of the pancreas as well as a gastric bypass in the past.  She presently denies any fever or chills but does complain of intermittent nausea.  She states certain foods bother her while others do not.  She continues to lose weight.  She appears to look healthier but she is losing weight.  She is down to 10.97 kg.  The patient states she is trying to eat but is having difficulty.  The upper GI showed no delayed gastric emptying and that the anastomosis is wide open.  She has emptied into her small bowel without difficulty and the contrast makes it to the colon at 90 minutes.  There is no sign of bowel obstruction either.  She is here with her  discuss neck steps.  I personally reviewed the patient's upper GI.     Update 3/26/24  She has had a PEG tube with jejunal extension placed in February 2024, and started a regular feeding protocol.     She is here today joined by her  for concerns with skin irritation/infection near feeding tube. Overall, she reports she is doing well with tube feeds, and that she has been able to increase the volume steadily, and that she has gained several pounds. She runs the feed at night. She reports occasional sensation of fullness, but denies nausea, vomiting, abdominal pain. She still takes Creon regularly. She has noticed some mild tenderness and minimal discharge near feeding tube, and wonder if this is a problem. She states she cleans it with alcohol and/or hydrogen peroxide.    Update 2/4/25  She was seen in ED on 8/12/24 with acute back pain and difficulty ambulating - found per MR on 8/13/24 to have new T6 and L5 & S1 compression fractures. Evaluated by neurosurgery who recommended 6  weeks with a TLSO brace. Discharged on 8/14/24.    GJ feeding tube in place per XR on 12/31/24    She is scheduled for repeat GI follow through study on 3/11/25    She is here today for follow-up.  The patient has not been seen since 2023.  She was in extremes at that time and ever since then she had a feeding tube placed, she has had a workup by Dr. Voss and she is actually gained weight.  She actually looks much better than she did when she was last seen over a year and a half ago.  In any event, the patient comes in complaining of epigastric pain.  Over the last several weeks she has been draining bile via her feeding tube and I am unclear as to the reason behind that.  She is started to take narcotics again in the evening only which was likely is causing the severe gastroparesis.    She is also complaining of chronic diarrhea and this is most likely related to the fact that she is draining the bile from her stomach.  In any event, the patient has not had any imaging recently.  She has gained weight.  She looks to be in good spirits compared as how she was a year and a half ago.    She has never been seen by a pain specialist with regards to possible celiac block for pancreatitis pain.  She is here with her  to catch up and also talk about this abdominal pain.    Past Medical History:   Diagnosis Date    Acute recurrent pancreatitis 08/2021    Anesthesia     PONV    Arthritis 08/2021    fingers    Celiac disease     Disorder of thyroid     hypothyroid    Gastroparesis 08/2021    Gluten intolerance     Pain stomach issues     Past Surgical History:   Procedure Laterality Date    SC UPPER GI ENDOSCOPY,DIAGNOSIS N/A 2/2/2024    Procedure: GASTROSCOPY WITH J TUBE REVISION;  Surgeon: Davin Garces M.D.;  Location: SURGERY SAME DAY [facility];  Service: Gastroenterology    SC UPPER GI ENDOSCOPY,DIAGNOSIS N/A 2/2/2024    Procedure: GASTROSCOPY;  Surgeon: Davin Garces M.D.;  Location: SURGERY SAME DAY  [facility];  Service: Gastroenterology    AR UPPER GI ENDOSCOPY,CTRL BLEED N/A 2/2/2024    Procedure: EGD, WITH CLIP PLACEMENT;  Surgeon: Davin Garces M.D.;  Location: SURGERY SAME DAY [facility];  Service: Gastroenterology    AR UPPER GI ENDOSCOPY,DIAGNOSIS N/A 1/30/2024    Procedure: GASTROSCOPY;  Surgeon: Claudia Oakes M.D.;  Location: SURGERY SAME DAY [facility];  Service: Gastroenterology    GASTROSTOMY N/A 1/30/2024    Procedure: PERCUTANEOUS ENDOSCOPIC GASTROSTOMY WITH TUBE PLACEMENT, JEJUNOSTOMY TUBE;  Surgeon: Claudia Oakes M.D.;  Location: SURGERY SAME DAY [facility];  Service: Gastroenterology    GASTROJEJUNOSTOMY N/A 05/05/2023    Procedure: MAIK-EN-Y GASTROJEJUNOSTOMY BYPASS AND EXPLORATORY LAPAORTOMY;  Surgeon: Bradly Cisneros M.D.;  Location: [facility];  Service: General    AR UPPER GI ENDOSCOPY,DIAGNOSIS  04/18/2022    Procedure: GASTROSCOPY - WITH PYLORUS DILATION WITH BOTOX INJECTION;  Surgeon: Rivas Cadet M.D.;  Location: [facility];  Service: Gastroenterology    AR ERCP,DIAGNOSTIC  04/18/2022    Procedure: ERCP (ENDOSCOPIC RETROGRADE CHOLANGIOPANCREATOGRAPHY);  Surgeon: Rivas Cadet M.D.;  Location: [facility];  Service: Gastroenterology    AR UPPER GI ENDOSCOPY,DIAGNOSIS N/A 08/09/2021    Procedure: GASTROSCOPY;  Surgeon: Rivas Cadet M.D.;  Location: [facility];  Service: EUS    AR ERCP,DIAGNOSTIC N/A 08/09/2021    Procedure: ERCP, DIAGNOSTIC;  Surgeon: Rivas Cadet M.D.;  Location: [facility];  Service: EUS    EGD W/ENDOSCOPIC ULTRASOUND N/A 08/09/2021    Procedure: EGD, WITH ENDOSCOPIC US - UPPER RADIAL;  Surgeon: Rivas Cadet M.D.;  Location: [facility];  Service: EUS    ORIF, HUMERUS Left 2006    KNEE ARTHROSCOPY Left 1973    CHOLECYSTECTOMY  1963    APPENDECTOMY CHILD      OTHER  5-21-23    PRIMARY C SECTION  1984, 1987, 1990     Allergies   Allergen Reactions     "Hydrocodone-Acetaminophen Rash     Rash, \"I can tolerate in small amounts\"      Loratadine-Pseudoephedrine Rash     rash    Sertraline Hcl Palpitations    Tramadol Vomiting     Fainting    Zoloft Rash     rash    Codeine Vomiting    Gluten Meal      \"violent stomach upset for 4-5 days\"     Outpatient Encounter Medications as of 2/4/2025   Medication Sig Dispense Refill    Pancrelipase, Lip-Prot-Amyl, 79356-221553 units Cap DR Particles Take 2 Capsules by mouth 3 times a day with meals. 1 with snacks 100 Capsule 0    ondansetron (ZOFRAN ODT) 4 MG TABLET DISPERSIBLE Take 4 mg by mouth every 6 hours as needed for Nausea/Vomiting.      oxyCODONE CR (OXYCONTIN) 40 MG Tablet Extended Release 12 hour Abuse-Deterrent tablet Take 40 mg by mouth every 12 hours as needed (AUTE PAIN).      lidocaine (ASPERFLEX) 4 % Patch Place 1 Patch on the skin every 24 hours. (Patient not taking: Reported on 2/4/2025) 30 Patch 0    polyethylene glycol/lytes (MIRALAX) Pack Take 1 Packet by mouth every day. (Patient not taking: Reported on 2/4/2025) 3 Each 0     No facility-administered encounter medications on file as of 2/4/2025.     Social History     Socioeconomic History    Marital status:      Spouse name: Not on file    Number of children: Not on file    Years of education: Not on file    Highest education level: Not on file   Occupational History    Not on file   Tobacco Use    Smoking status: Never     Passive exposure: Never    Smokeless tobacco: Never   Vaping Use    Vaping status: Never Used   Substance and Sexual Activity    Alcohol use: Never    Drug use: Not Currently     Types: Oral     Comment: cbd/thc, daily for pain - last took 5/3/2023    Sexual activity: Not on file   Other Topics Concern    Not on file   Social History Narrative    Not on file     Social Drivers of Health     Financial Resource Strain: Not on file   Food Insecurity: Not on file   Transportation Needs: Not on file   Physical Activity: Not on file " "  Stress: Not on file   Social Connections: Not on file   Intimate Partner Violence: Not At Risk (8/12/2024)    Humiliation, Afraid, Rape, and Kick questionnaire     Fear of Current or Ex-Partner: No     Emotionally Abused: No     Physically Abused: No     Sexually Abused: No   Housing Stability: Not on file      Social History     Tobacco Use   Smoking Status Never    Passive exposure: Never   Smokeless Tobacco Never     Social History     Substance and Sexual Activity   Alcohol Use Never     Social History     Substance and Sexual Activity   Drug Use Not Currently    Types: Oral    Comment: cbd/thc, daily for pain - last took 5/3/2023      History reviewed. No pertinent family history.    Review of Systems   Constitutional:  Positive for malaise/fatigue and weight loss.   Gastrointestinal:  Positive for diarrhea and heartburn.   All other systems reviewed and are negative.       Objective:   /62 (BP Location: Right arm, Patient Position: Sitting, BP Cuff Size: Adult)   Pulse 73   Temp 36.7 °C (98.1 °F) (Temporal)   Ht 1.6 m (5' 3\")   Wt 34 kg (75 lb)   SpO2 98%   BMI 13.29 kg/m²     Physical Exam  Vitals and nursing note reviewed.   Constitutional:       Appearance: She is ill-appearing.      Comments: Still extremely thin but looks much better from the last time I saw her in 2023   HENT:      Head: Normocephalic and atraumatic.      Mouth/Throat:      Mouth: Mucous membranes are moist.      Pharynx: Oropharynx is clear.   Eyes:      Extraocular Movements: Extraocular movements intact.      Conjunctiva/sclera: Conjunctivae normal.      Pupils: Pupils are equal, round, and reactive to light.   Cardiovascular:      Rate and Rhythm: Normal rate and regular rhythm.   Pulmonary:      Effort: Pulmonary effort is normal.      Breath sounds: Normal breath sounds.   Abdominal:      General: Abdomen is flat. Bowel sounds are normal.      Palpations: Abdomen is soft.      Comments: Epigastric pain radiating to " her back consistent with chronic pancreatitis pain.  Feeding tube intact and functioning well   Musculoskeletal:         General: Normal range of motion.      Cervical back: Normal range of motion.   Skin:     General: Skin is warm.   Neurological:      General: No focal deficit present.      Mental Status: She is oriented to person, place, and time. Mental status is at baseline.   Psychiatric:         Mood and Affect: Mood normal.         Behavior: Behavior normal.         Thought Content: Thought content normal.         Judgment: Judgment normal.         Labs         (H): Data is abnormally high     Imaging  XR ABDOMEN (12/31/24)  GJ TUBE IN PLACE WITH NG TUBE APPEARING TO PASS INTO THE JEJUNUM WITH TIP IN THE LEFT LOWER PELVIS.     SMALL BOWEL FOLLOW THROUGH (9/13/23)  IMPRESSION:  1.  Surgical change consistent with prior side-to-side anastomosis with a loop of jejunum and the gastric antrum. There is no evidence of stricture or delay in passage of contrast across the anastomosis.     2.  Normal small bowel follow-through with contrast reaching the colon within 90 minutes.     CT ABDOMEN (7/10/23)  IMPRESSION:  1.  Changes of gastric bypass  2.  Prior cholecystectomy with pneumobilia as before  3.  Small BILATERAL pleural effusions, new since recent outside study  4.  Ascites, increased since the recent outside study  5.  RIGHT upper and LEFT lower quadrant abdominal wall gas, possibly related to medication administration or other intervention  6.  BILATERAL L5 pars defects with grade 1 anterolisthesis of L5 on S1     CT ABDOMEN (5/3/23)  IMPRESSION:  1.  Prior cholecystectomy.  2.  Pneumobilia.  3.  No significant pancreatic abnormality.  4.  Abdominal aortic atherosclerosis without aneurysm.     NM GASTRIC EMPTYING STUDY (4/27/23)  IMPRESSION:  Gastric emptying halftime measured at 204 minutes consistent with delayed gastric emptying. This previously measured approximately 223.5 minutes        NM GASTRIC  EMPTYING STUDY (7/20/21)  FINDINGS:     After observation the activity remaining in the stomach is as follows:     Immediately:  100%     1 hour:  68%     2 hours:   59%     4 hours:   47%     IMPRESSION:     Significant retained gastric activity at 4 hours indicating delayed gastric emptying.     Pathology  N/A     Procedures  SURGERY (5/5/23)  PROCEDURE:  Sinai-en-Y gastrojejunostomy.     PROCEDURE (4/18/23)  GASTROSCOPY - WITH PYLORUS DILATION WITH BOTOX INJECTION - Wound Class: Clean Contaminated  ERCP (ENDOSCOPIC RETROGRADE CHOLANGIOPANCREATOGRAPHY) - Wound Class: Clean Contaminated     Findings:              EGD                          Esophagus                                      Unremarkable mucosa                          Stomach                                      Unremarkable mucosa                                      Narrow caliber pylorus                          Duodenum                                      Unremarkable mucosa                 ERCP                          Ampulla                                      Post sphincterotomy anatomy                          Pancreatic duct                                      Neither injected nor cannulated                          CBD                                      Normal course and caliber                 Therapeutics                          Intersphincteric injection of Botox - 100U divided 4 quadrants                          Hydraulic dilation of pylorus 18-19-20 mm balloon                          10-11-12 mm hydraulic dilation of biliary ampulla                          Balloon sweeps negative for stones or debris     PROCEDURE (8/9/21)  1.  Esophagogastroduodenoscopy with hydraulic dilation of gastric outlet.  2.  Endoscopic ultrasound, upper.  3.  Endoscopic retrograde cholangiopancreatography with biliary   sphincterotomy, bile duct balloon soft stone debris extraction, radiologic   interpretation of static and dynamic fluoroscopic images  by myself, at no time   was a radiologist present in the room.     EGD FINDINGS:  1.  Esophagus:  Unremarkable mucosa.  2.  Long and J-shaped stomach, otherwise unremarkable mucosa.  3.  Mildly stenotic pylorus, successfully dilated to 20 mm with balloon.  4.  Duodenum:  Unremarkable mucosa.  Possible extrinsic compression of the   second portion.     ENDOSCOPIC ULTRASOUND FINDINGS:  1.  Celiac axis, no adenopathy.  2.  Pancreatic body and tail, normal parenchyma.  3.  Pancreatic duct, normal course and caliber.  4.  Gallbladder, surgically absent.  5.  Biliary ampulla, unremarkable.  6.  Common bile duct, generous caliber down to the level of the ampulla.    Normal course.  Small subtle nonshadowing filling defect suspicious of stone   versus sludge ball.  7.  Head of pancreas, normal parenchyma and normal dorsal ventral transition.     ERCP FINDINGS:  1.  Biliary ampulla, small, but otherwise unremarkable appearance.  Stenotic   to wire and cannula passage.  2.  Pancreatic duct, neither injected nor cannulated.  3.  Common bile duct, normal course.  Generous caliber.  Small distal filling   defect consistent with small stone.    Diagnosis:     1. Chronic pancreatitis, unspecified pancreatitis type (HCC)        2. Underweight due to inadequate caloric intake        3. History of Sinai-en-Y gastric bypass        4. Pancreatic insufficiency        5. Malnutrition compromising bodily function (HCC)        6. Delayed gastric emptying            Medical Decision Making:  Today's Assessment / Status / Plan:     In light of the present findings, the patient will be referred to Dr. Hall to see if she would qualify for celiac block to help manage her chronic abdominal pain.  If they can do a block and it works and she can, for narcotics which will prevent her gastroparesis and allow her to eat.    As for her GERD and her bitter taste in the mouth, this is most likely gastroparesis leading to reflux of bile causing that  unusual taste.    I have also educated her that I do not want her to drain the bile from her stomach.  She is in need of the bile and ever since she started draining the bile from her stomach via the feeding tube she developed diarrhea.  I educated both the  and the wife that bile is essential for fat and nutritional absorption.    She will follow-up with Dr. Voss, but there is no surgical intervention needed at this time.    I, Dr. Cisneros have entered, reviewed and confirmed the above diagnoses related to this patient on this date of service, 2/4/2025  8:11 AM.    She agreed and verbalized her agreement and understanding with the current plan. I answered all questions and concerns she has at this time and advised her to call at any time in the interim with questions or concerns in regards to her care.    Thank you for allowing me to participate in her care, I will continue to follow closely.       Please note that this dictation was created using voice recognition software. I have made every reasonable attempt to correct obvious errors, but I expect that there are errors of grammar and possibly content that I did not discover before finalizing the note.     Thank you for this consultation and allowing me to participate in your patient's care. If I can be of further service please contact my office.      I, Dr Cisneros, have entered, reviewed and confirmed the above diagnoses related to this patient on this date of service, as per the time and date noted at top of this note.

## 2025-01-27 ENCOUNTER — APPOINTMENT (OUTPATIENT)
Dept: INTERNAL MEDICINE | Facility: OTHER | Age: 70
End: 2025-01-27
Payer: MEDICARE

## 2025-01-29 ENCOUNTER — NON-PROVIDER VISIT (OUTPATIENT)
Dept: INTERNAL MEDICINE | Facility: OTHER | Age: 70
End: 2025-01-29
Payer: MEDICARE

## 2025-01-29 VITALS — WEIGHT: 77 LBS | BODY MASS INDEX: 14.08 KG/M2

## 2025-01-29 DIAGNOSIS — Z98.84 HISTORY OF ROUX-EN-Y GASTRIC BYPASS: ICD-10-CM

## 2025-01-29 DIAGNOSIS — K31.84 GASTROPARESIS: ICD-10-CM

## 2025-01-29 DIAGNOSIS — K30 DELAYED GASTRIC EMPTYING: ICD-10-CM

## 2025-01-29 DIAGNOSIS — R63.6 UNDERWEIGHT DUE TO INADEQUATE CALORIC INTAKE: ICD-10-CM

## 2025-01-29 DIAGNOSIS — F50.019 ANOREXIA NERVOSA, RESTRICTING TYPE, UNSPECIFIED SEVERITY: ICD-10-CM

## 2025-01-29 DIAGNOSIS — E43 SEVERE PROTEIN-CALORIE MALNUTRITION (HCC): ICD-10-CM

## 2025-01-29 ASSESSMENT — FIBROSIS 4 INDEX: FIB4 SCORE: 0.96

## 2025-01-29 NOTE — PROGRESS NOTES
Kathy is here for FU with RD for support with protein calorie malnutrition, being underweight with a BMI less than 19 and history of anorexia, severe protein-calorie malnutrition and history of Sinai-en-Y gastric bypass surgery along with gastroparesis     Had new J Tube placed on 12/31/24 due to clogging with her previous tube   Plan is to not d/c tube feedings at this time as she still is not get to weight goal and she still cannot maintain adequate calories by mouth   The surgery was long - 2 hour surgery - had difficulities with the old tube getting it removed     She is not doing well   She is still getting bile in her tube -1/4 cup of bile each day     Tube is going into her stomach and then another into her Jejenum   Bile is going into the stomach tube   Tube feeding formula is going into the Jejenum   Bile is backing into the gastric tube   No vomiting but nausea and intense abdominal pain     Only able to eat muffin, coffee in AM  Lunch is 1/2 jello and 2 crackers  Dinner is 1 fish stick OR 2 chciken nuggets and 1 tbsp of vegetable   Snack  cookie  Sick with this  Can only tolerate 1-2 bottles of Tube feeds at night   Abdominal pain is pretty severe  Has diarrhea for 1.5 weeks now   Still has bitter taste   Things are getting worse, not marika     Saw her PCP -   Tube feeds are Peptamen 1.5    Going to see Dr. BRUNER in 1 weeks on February 4th Feb 21st is the upper GI with barium     Still taking her creon - taking 1 large dose at breakfast and then another large dose at dinner     Concern over the excess bile being produced and the need for her to drain that daily. ?consideration of this being exacerbated by her tube feeds but currently on a pancreatitis/malabsorption/delayed gastric emptying specific formula that she did tolerate for months.     Peptamen 1.5 is 14 grams of fat for 250 mL of formula which is 32% of calories coming from fat   She is consuming 2 cans per day with minimal by mouth leading to  only 28 g of fat per day with more than 70% coming from MCT fat   I did encourage her to try taking her Creon with her tube feeds which she stopped doing to see if that provides better tolerance.   Waiting to hear what Dr. BRUNER recommends at this point  Until then, also recommended she split up her cans to see if we can get better tolerance for her so she will do one can overnight and one around breakfast and will monitor sx.  She is of course down in weight and we are going backwards. Careful monitoring of her nutrition status is critical over the next few weeks to determine what next steps we may need to take.    RTC with RD in 2 weeks    Time spent: 60 minutes

## 2025-01-29 NOTE — PATIENT INSTRUCTIONS
Goals:  Only do 1 bottle of Peptamen 1.5 at bedtime/overnight and another bottle in the morning with breakfast to allow some time to recover from the first bottle   Take your creon with each bottle of tube feeding formula   Keep working on adding in some potatoes or rice with dinner

## 2025-02-04 ENCOUNTER — OFFICE VISIT (OUTPATIENT)
Dept: SURGICAL ONCOLOGY | Facility: MEDICAL CENTER | Age: 70
End: 2025-02-04
Payer: MEDICARE

## 2025-02-04 VITALS
TEMPERATURE: 98.1 F | WEIGHT: 75 LBS | HEART RATE: 73 BPM | BODY MASS INDEX: 13.29 KG/M2 | HEIGHT: 63 IN | SYSTOLIC BLOOD PRESSURE: 108 MMHG | DIASTOLIC BLOOD PRESSURE: 62 MMHG | OXYGEN SATURATION: 98 %

## 2025-02-04 DIAGNOSIS — R63.6 UNDERWEIGHT DUE TO INADEQUATE CALORIC INTAKE: ICD-10-CM

## 2025-02-04 DIAGNOSIS — K86.89 PANCREATIC INSUFFICIENCY: ICD-10-CM

## 2025-02-04 DIAGNOSIS — K86.1 CHRONIC PANCREATITIS, UNSPECIFIED PANCREATITIS TYPE (HCC): ICD-10-CM

## 2025-02-04 DIAGNOSIS — Z98.84 HISTORY OF ROUX-EN-Y GASTRIC BYPASS: ICD-10-CM

## 2025-02-04 DIAGNOSIS — K30 DELAYED GASTRIC EMPTYING: ICD-10-CM

## 2025-02-04 DIAGNOSIS — E46 MALNUTRITION COMPROMISING BODILY FUNCTION (HCC): ICD-10-CM

## 2025-02-04 PROCEDURE — 3078F DIAST BP <80 MM HG: CPT | Performed by: SURGERY

## 2025-02-04 PROCEDURE — 3074F SYST BP LT 130 MM HG: CPT | Performed by: SURGERY

## 2025-02-04 PROCEDURE — 99215 OFFICE O/P EST HI 40 MIN: CPT | Performed by: SURGERY

## 2025-02-04 ASSESSMENT — ENCOUNTER SYMPTOMS
WEIGHT LOSS: 1
HEARTBURN: 1
DIARRHEA: 1

## 2025-02-04 ASSESSMENT — FIBROSIS 4 INDEX: FIB4 SCORE: 0.96

## 2025-02-11 ENCOUNTER — NON-PROVIDER VISIT (OUTPATIENT)
Dept: INTERNAL MEDICINE | Facility: OTHER | Age: 70
End: 2025-02-11
Payer: MEDICARE

## 2025-02-11 VITALS — BODY MASS INDEX: 13.46 KG/M2 | WEIGHT: 76 LBS

## 2025-02-11 DIAGNOSIS — K86.1 IDIOPATHIC CHRONIC PANCREATITIS (HCC): ICD-10-CM

## 2025-02-11 DIAGNOSIS — Z98.84 HISTORY OF ROUX-EN-Y GASTRIC BYPASS: ICD-10-CM

## 2025-02-11 DIAGNOSIS — R11.2 PONV (POSTOPERATIVE NAUSEA AND VOMITING): Chronic | ICD-10-CM

## 2025-02-11 DIAGNOSIS — Z98.890 PONV (POSTOPERATIVE NAUSEA AND VOMITING): Chronic | ICD-10-CM

## 2025-02-11 DIAGNOSIS — E43 SEVERE PROTEIN-CALORIE MALNUTRITION (HCC): ICD-10-CM

## 2025-02-11 DIAGNOSIS — K31.84 GASTROPARESIS: ICD-10-CM

## 2025-02-11 PROCEDURE — 97803 MED NUTRITION INDIV SUBSEQ: CPT | Performed by: DIETITIAN, REGISTERED

## 2025-02-11 ASSESSMENT — FIBROSIS 4 INDEX: FIB4 SCORE: 0.96

## 2025-02-11 NOTE — PATIENT INSTRUCTIONS
Goals:  Lets try doing 2 cans of formula at 80 mL/hr at night and see if you tolerate that   Take your zofran at bedtime. Continue to take your Creon

## 2025-02-11 NOTE — PROGRESS NOTES
Kathy is here for FU with RD for support with protein calorie malnutrition, being underweight with a BMI less than 19 and history of anorexia, severe protein-calorie malnutrition and history of Sinai-en-Y gastric bypass surgery along with gastroparesis     Saw Dr. Fuller - felt that went ok   He recommended that she see Dr. GARCIA to do a celiac block to see if that helps with her pain - going to have it done on Feb 20th     Not draining the bile but is still having the diarrhea   Now she will flush the extra tube to try to get the bile to go down the tube but doesn't always happen     Started taking her Creon again with her night tube feeds   Hasn't noticed a difference  Doing only one tube at night; can't do the morning formula due to diarrhea   Still getting nausea and cramping   Starting TF at 6:30pm and goes until 9:30pm     Upper barium GI test on Feb 21st     Eating   Breakfast - muffins, coffee for breakfast   Lunch - jello or pudding, 2 crackers  Dinner - tablespoon of rice, green beans, and chicken nugget     She is down a pound from last appointment     Currently running her single can at 80 ml/hr (in the past the highest was 80 mL/hr)   Interested in trying to do 2 cans at the same right     Kathy is up to trying 2 cans at night to see if we can get some extra calories in her  She is already on a semi elemental formula that is specific to pancreatitis and gastroparesis and tolerated for quite a long time so will follow her pain block and monitor her progress before consideration for any nutrition changes    Set goals; rtc with RD in 1 month    Time spent: 45 minutes

## 2025-03-07 ENCOUNTER — TELEPHONE (OUTPATIENT)
Facility: MEDICAL CENTER | Age: 70
End: 2025-03-07
Payer: MEDICARE

## 2025-03-07 NOTE — TELEPHONE ENCOUNTER
PT called today stating that she continues to have diarrhea and is losing weight. She called Dr. Voss's office, but was told that he is out of the the office for a few weeks. She is not sure what to do at this point.

## 2025-03-14 ENCOUNTER — NON-PROVIDER VISIT (OUTPATIENT)
Dept: INTERNAL MEDICINE | Facility: OTHER | Age: 70
End: 2025-03-14
Payer: MEDICARE

## 2025-03-14 DIAGNOSIS — K86.89 PANCREATIC INSUFFICIENCY: ICD-10-CM

## 2025-03-14 DIAGNOSIS — E46 MALNUTRITION COMPROMISING BODILY FUNCTION (HCC): ICD-10-CM

## 2025-03-14 DIAGNOSIS — R63.6 UNDERWEIGHT DUE TO INADEQUATE CALORIC INTAKE: ICD-10-CM

## 2025-03-14 DIAGNOSIS — E43 SEVERE PROTEIN-CALORIE MALNUTRITION (HCC): ICD-10-CM

## 2025-03-14 DIAGNOSIS — K30 DELAYED GASTRIC EMPTYING: ICD-10-CM

## 2025-03-14 DIAGNOSIS — R63.4 WEIGHT LOSS, NON-INTENTIONAL: ICD-10-CM

## 2025-03-14 PROCEDURE — 97803 MED NUTRITION INDIV SUBSEQ: CPT | Performed by: DIETITIAN, REGISTERED

## 2025-03-14 NOTE — PROGRESS NOTES
Kathy is here for FU with RD for support with protein calorie malnutrition, being underweight with a BMI less than 19 and history of anorexia, severe protein-calorie malnutrition and history of Sinai-en-Y gastric bypass surgery along with gastroparesis     Did some testing  Upper Gi  Continued diarrhea - did stool tests and all negative     Tried taking immodium and did seem to help     Got a referral to  [facility] for a second opinon to help with figuring out what to do     Tried 2 days with no tube feed fomula and no change to her diarrhea     No longer draining the bile but did one day due to the bile being in the tube and was feeling sick - so tried to get rid of the bile     Taking omprazole first thing in the morning   Does seem to help calm things down  Encouraged her to stop taking the potassium   Still having the metallic taste but some days are better than others     Always eating something     Currently doing Tfs starting at 8pm     Due to her ongoing sx, recommend that she begin taking her Creon earlier before her TF starts to see if we can get her to digest that by the time her Tfs begin given her known severe gastroparesis. ?consideration for her creon to be added to her jtube instead if this does not work?  Worked with her on also adding in some fiber to see if we can support her with slowing down her gi transit time and decrease tf rate to see if that provides better tolerance.   Set goals; rtc with RD in 2 weeks.    Time spent: 60 minutes

## 2025-03-14 NOTE — PATIENT INSTRUCTIONS
Goals  1. You could try slowly adding small amounts of fiber. Start out with 1/8 cup with breakfast to see how you tolerate  2. Take your Creon about 4 hours (5pm) so we can get it down and digested before your tube feeds begin  3. Decrease rate to 60 mL/hour to see if that is better tolerated   
No

## 2025-04-01 ENCOUNTER — NON-PROVIDER VISIT (OUTPATIENT)
Dept: INTERNAL MEDICINE | Facility: OTHER | Age: 70
End: 2025-04-01
Payer: MEDICARE

## 2025-04-01 VITALS — BODY MASS INDEX: 13.36 KG/M2 | WEIGHT: 75.4 LBS

## 2025-04-01 DIAGNOSIS — R63.6 UNDERWEIGHT DUE TO INADEQUATE CALORIC INTAKE: ICD-10-CM

## 2025-04-01 DIAGNOSIS — K31.84 GASTROPARESIS: ICD-10-CM

## 2025-04-01 DIAGNOSIS — Z98.84 HISTORY OF ROUX-EN-Y GASTRIC BYPASS: ICD-10-CM

## 2025-04-01 DIAGNOSIS — E43 SEVERE PROTEIN-CALORIE MALNUTRITION (HCC): ICD-10-CM

## 2025-04-01 DIAGNOSIS — R63.0 ANOREXIA: ICD-10-CM

## 2025-04-01 PROCEDURE — 97803 MED NUTRITION INDIV SUBSEQ: CPT | Performed by: DIETITIAN, REGISTERED

## 2025-04-01 ASSESSMENT — FIBROSIS 4 INDEX: FIB4 SCORE: 0.96

## 2025-04-01 NOTE — PROGRESS NOTES
Kathy is here for FU with RD for support with protein calorie malnutrition, being underweight with a BMI less than 19 and history of anorexia, severe protein-calorie malnutrition and history of Sinai-en-Y gastric bypass surgery along with gastroparesis     She did do the pancreatic nerve block on Feb 21st and did not seem to help for the first 3 weeks after she had it done but now feeling pancreas is feeling better   No more bile coming out of her tube for 2.5 weeks and her tube is clear   Bitter taste is better   Diarrhea is also better     Creon is now being taken around 4-5:30pm and then tube feeds start at 8pm   Doing 70 mL/hour and finding it is upsetting her stomach a little bit but cannot tolerate 80 mL but has adjusted to that 70mL/hr and finished up around 2:30am  A few nights she noticed that she forgot her creon early and did see incresae in her diarrhea and pain   Getting in 2 cans/night     Feels she cannot get in 6 meals per day lately with having to do 2 cans of tube feed formula  Not feeling hungry to eat  Hard to eat with the tube feeds    Wants to try eating every 2 hours starting at 7:30am and start decreasing her tube feeds to see if she can get herself off this tube  Plan to get her tube redone in July so the goal is to see if we can get her to consume enough calories by mouth before then    Taking slippery elm to help relax her stomach with her omprezole - does not want to come off the slippery elm as she feels that has really helped her    Worked with Kathy today on finding a meal plan she was comfortable with doing and is helping her to slowly increasing her portions/volume and calories to see if we can get her to consume enough by mouth that we can start to taper off the tube feeds  This will be a challenging feat and I will do what I can, but even in conversation with her today, there was some emotions/crying with the pushing of extra fat in her foods to help increase calories and her fear  around that with her significant history of anorexia  This will be something we will have to be diligent with in working through her fears and monitoring her tolerance    Goal is to work on increasing her oral feeds to 1200 calories per day and maintain 375-450 calories with her tube feeds and then taper off feeds as we increasing calories by mouth to the goal of at least 7239-8089 calories/day for weight gain of 1 lb per week.   She has lost about 7 lbs since her pancreatitis situation but given the pancreatic nerve block seems to be working, we will work back up to the goal of 90 lbs (prev was able to get her to almost 83#)    Set goals; rtc with RD in 2 weeks    Time spent: 60 minutes

## 2025-04-01 NOTE — PATIENT INSTRUCTIONS
Goals:  Take your Creon 3 hours before bed (1/2 hour before dinner)  Continue 2 cans overnight for the first week and then decrease to 1.5 cans if that week goes well with oral eating  Eat every 2 hours. Take Creon every other meal   Meal plan:   Breakfast - Muffin regular size, coffee, MCT oil 1 tsp with dried cranberries   Snack - 1/2 egg, 2 squares of GF danielle crackers, 2 tsp of honey, 1/2 cup of hot cocoa or juice  (? 1 tsp of MCT oil)   Lunch - 1/2 egg, 4 crackers, canned fruit, thin slice of cheese   Snack - 1/2 boost and a cookie   Dinner - 1 fish stick or chicken nugget, jello, 2 tbsp instant mashed potato with butter   Snack - 2 cookies, 1/2 scoop of ice cream

## 2025-04-16 ENCOUNTER — TELEPHONE (OUTPATIENT)
Dept: INTERNAL MEDICINE | Facility: OTHER | Age: 70
End: 2025-04-16

## 2025-04-16 ENCOUNTER — HOSPITAL ENCOUNTER (EMERGENCY)
Facility: MEDICAL CENTER | Age: 70
End: 2025-04-16
Attending: EMERGENCY MEDICINE
Payer: MEDICARE

## 2025-04-16 VITALS
HEIGHT: 63 IN | BODY MASS INDEX: 13.46 KG/M2 | OXYGEN SATURATION: 95 % | HEART RATE: 68 BPM | WEIGHT: 76 LBS | RESPIRATION RATE: 18 BRPM | TEMPERATURE: 98.9 F | SYSTOLIC BLOOD PRESSURE: 110 MMHG | DIASTOLIC BLOOD PRESSURE: 54 MMHG

## 2025-04-16 DIAGNOSIS — K94.23 GASTROSTOMY TUBE DYSFUNCTION (HCC): ICD-10-CM

## 2025-04-16 PROCEDURE — 99284 EMERGENCY DEPT VISIT MOD MDM: CPT

## 2025-04-16 ASSESSMENT — PAIN DESCRIPTION - PAIN TYPE: TYPE: ACUTE PAIN

## 2025-04-16 ASSESSMENT — FIBROSIS 4 INDEX: FIB4 SCORE: 0.96

## 2025-04-16 NOTE — ED NOTES
Patient is stable for discharge at this time, anticipatory guidance provided, close follow-up is encouraged, and ED return instructions have been detailed. Patient is both agreeable to the disposition and plan and discharged home. Pt is ambulatory but requested wc out to car.

## 2025-04-16 NOTE — ED TRIAGE NOTES
"Chief Complaint   Patient presents with    Feeding Tube Problem     Pt has Jtube to LLQ abd. Tube dislodged this morning at 0700, Gastric contents coming from site when pt sits upright. +LLQ abd pain since tube dislodged +N w/o V     /69   Pulse 73   Temp 37.8 °C (100 °F)   Resp 18   Ht 1.6 m (5' 3\")   Wt 34.5 kg (76 lb)   SpO2 98%   BMI 13.46 kg/m²     "

## 2025-04-16 NOTE — TELEPHONE ENCOUNTER
[facility].Feeding tube was pushed back in If tube does not function correctly she will need to go to [facility] for surgery tomorrow.

## 2025-04-16 NOTE — DISCHARGE INSTRUCTIONS
1.  Please call your gastroenterology clinic today to see if they can schedule an early follow-up appointment for G-tube removal, if appropriate.    2.  Return to the emergency department if you develop any new or worsening symptoms including pain, fevers, drainage around the site, the tube is not working, or if you have any further concerns.

## 2025-04-16 NOTE — ED NOTES
Jtube site assessed and cleaned up by Dr Bowman. I applied a 2x2 adhesive boarded mepilex with a spit over jtube disc/tube, paper tape used to secure tubing. Pt ambulated to BR w slow steady gait, states had no dislodgment of tube.

## 2025-04-16 NOTE — ED PROVIDER NOTES
Emergency Physician Note    Chief Concern:  Chief Complaint   Patient presents with    Feeding Tube Problem     Pt has Jtube to LLQ abd. Tube dislodged this morning at 0700, Gastric contents coming from site when pt sits upright. +LLQ abd pain since tube dislodged +N w/o V         External Records Reviewed:  Outpatient records reviewed: Patient was seen in hepatobiliary clinic 2/4/2025, followed by Dr. Cisneros.  She has noted a history of chronic pancreatitis, history of Sinai-en-Y gastric bypass, pancreatic insufficiency, malnutrition, and delayed gastric emptying.  Noted that she appears cachectic.  She had a PEG tube placed with jejunal extension in February, 2024.  She is followed by Dr. Suárez, gastroenterologist.    HPI/ROS     Outside Historians:   Family member at bedside provides additional history.     HPI:  Kathy Dumont is a 69 y.o. female who presents to the emergency department today for evaluation of a dislodged PEG tube.  She is a past medical history significant for chronic pancreatitis, has a PEG tube in place with jejunal extension, initially placed by Dr. Gandhi, replaced most recently by Dr. Gann.  She is followed at [facility] by Dr. Suárez.  This morning, the PEG tube dislodged, balloon pulled out through the skin, the remainder of the tube is still in place.  She now has leaking of gastric contents around the tube, and presented to the emergency department for further evaluation.  She states the tube is tethered, due to the jejunal component.  She is requesting complete removal at this time, plan is to follow-up in June to discuss possible tube removal, however she has been working diligently with a nutritionist, and does not feel that the tube is necessary any longer.  Family member states that she has been doing well with eating small meals frequently, and making great progress with her dietitian.    She did try to replace the tube at home, however was unable to  do so.    PAST MEDICAL HISTORY  Past Medical History:   Diagnosis Date    Acute recurrent pancreatitis 08/2021    Anesthesia     PONV    Arthritis 08/2021    fingers    Celiac disease     Disorder of thyroid     hypothyroid    Gastroparesis 08/2021    Gluten intolerance     Pain stomach issues       SURGICAL HISTORY  Past Surgical History:   Procedure Laterality Date    [place] UPPER GI ENDOSCOPY,DIAGNOSIS N/A 2/2/2024    Procedure: GASTROSCOPY WITH J TUBE REVISION;  Surgeon: Davin Garces M.D.;  Location: SURGERY SAME DAY [facility];  Service: Gastroenterology    [place] UPPER GI ENDOSCOPY,DIAGNOSIS N/A 2/2/2024    Procedure: GASTROSCOPY;  Surgeon: Davin Garces M.D.;  Location: SURGERY SAME DAY [facility];  Service: Gastroenterology    [place] UPPER GI ENDOSCOPY,CTRL BLEED N/A 2/2/2024    Procedure: EGD, WITH CLIP PLACEMENT;  Surgeon: Davin Garces M.D.;  Location: SURGERY SAME DAY [facility];  Service: Gastroenterology    [place] UPPER GI ENDOSCOPY,DIAGNOSIS N/A 1/30/2024    Procedure: GASTROSCOPY;  Surgeon: Claudia Oakse M.D.;  Location: SURGERY SAME DAY [facility];  Service: Gastroenterology    GASTROSTOMY N/A 1/30/2024    Procedure: PERCUTANEOUS ENDOSCOPIC GASTROSTOMY WITH TUBE PLACEMENT, JEJUNOSTOMY TUBE;  Surgeon: Claudia Oakes M.D.;  Location: SURGERY SAME DAY [facility];  Service: Gastroenterology    GASTROJEJUNOSTOMY N/A 05/05/2023    Procedure: MAIK-EN-Y GASTROJEJUNOSTOMY BYPASS AND EXPLORATORY LAPAORTOMY;  Surgeon: Bradly Cisneros M.D.;  Location: [facility];  Service: General    [place] UPPER GI ENDOSCOPY,DIAGNOSIS  04/18/2022    Procedure: GASTROSCOPY - WITH PYLORUS DILATION WITH BOTOX INJECTION;  Surgeon: Rivas Cadet M.D.;  [place] [facility];  Service: Gastroenterology    [place] ERCP,DIAGNOSTIC  04/18/2022    Procedure: ERCP (ENDOSCOPIC RETROGRADE CHOLANGIOPANCREATOGRAPHY);  Surgeon: Rivas Cadet M.D.;  [place] [facility];  Service: Gastroenterology    [place]  UPPER GI ENDOSCOPY,DIAGNOSIS N/A 08/09/2021    Procedure: GASTROSCOPY;  Surgeon: Rivas Cadet M.D.;  Location: SURGERY [facility];  Service: EUS    [place] ERCP,DIAGNOSTIC N/A 08/09/2021    Procedure: ERCP, DIAGNOSTIC;  Surgeon: Rivas Cadet M.D.;  Location: SURGERY [facility];  Service: EUS    EGD W/ENDOSCOPIC ULTRASOUND N/A 08/09/2021    Procedure: EGD, WITH ENDOSCOPIC US - UPPER RADIAL;  Surgeon: Rivas Cadet M.D.;  Location: SURGERY [facility];  Service: EUS    ORIF, HUMERUS Left 2006    KNEE ARTHROSCOPY Left 1973    CHOLECYSTECTOMY  1963    APPENDECTOMY CHILD      OTHER  5-21-23    PRIMARY C SECTION  1984, 1987, 1990       FAMILY HISTORY  History reviewed. No pertinent family history.    SOCIAL HISTORY   reports that she has never smoked. She has never been exposed to tobacco smoke. She has never used smokeless tobacco. She reports that she does not currently use drugs after having used the following drugs: Oral. She reports that she does not drink alcohol.    CURRENT MEDICATIONS  Discharge Medication List as of 4/16/2025 11:42 AM        CONTINUE these medications which have NOT CHANGED    Details   lidocaine (ASPERFLEX) 4 % Patch Place 1 Patch on the skin every 24 hours., Disp-30 Patch, R-0, Normal      polyethylene glycol/lytes (MIRALAX) Pack Take 1 Packet by mouth every day., Disp-3 Each, R-0, Normal      Pancrelipase, Lip-Prot-Amyl, 80160-738839 units Cap DR Particles Take 2 Capsules by mouth 3 times a day with meals. 1 with snacks, Disp-100 Capsule, R-0, Normal      ondansetron (ZOFRAN ODT) 4 MG TABLET DISPERSIBLE Take 4 mg by mouth every 6 hours as needed for Nausea/Vomiting., Historical Med      oxyCODONE CR (OXYCONTIN) 40 MG Tablet Extended Release 12 hour Abuse-Deterrent tablet Take 40 mg by mouth every 12 hours as needed (AUTE PAIN)., Historical Med             ALLERGIES  Hydrocodone-acetaminophen, Loratadine-pseudoephedrine, Sertraline hcl, Tramadol, Zoloft, Codeine,  "and Gluten meal    PHYSICAL EXAM  Vital Signs: /54   Pulse 68   Temp 37.2 °C (98.9 °F) (Temporal)   Resp 18   Ht 1.6 m (5' 3\")   Wt 34.5 kg (76 lb)   SpO2 95%   BMI 13.46 kg/m²   Constitutional: Alert, no acute distress  HENT: Normocephalic, atraumatic.  Cardiovascular: No tachycardia  Pulmonary: No respiratory distress, normal work of breathing  Abdomen: Soft, nondistended.  G-tube is partially dislodged, balloon has pulled through the skin, however the remainder of the tube is in place.  She is leaking gastric contents around the tube.  Skin: Warm, dry, no rashes or lesions, skin is intact around the G-tube    Diagnostic Studies & Procedures    Labs:  All labs reviewed by me as noted below.    Course and Medical Decision Making    Initial Assessment and Plan:  Ms. Dumont presents to the emergency department today for evaluation of a dislodged G-tube.    I discussed her presentation with Dr. Gann, with digestive [facility], and relayed that the patient would like the tube removed if possible.  He was able to review the OR schedule, there is not any available time today in the endoscopy suite to remove the tube.  As the tube is tethered, and asked me is necessary to remove it to cut the sutures and release the tube.  His recommendation is to deflate the balloon, advance the tube back into place, reinflate the balloon, and follow-up on an outpatient basis if the tube remains in place and is functional.    Balloon was deflated, 2 mL of fluid was drained, tube indicates 7 to 10 mL to fill the balloon.  Suspect balloon likely lost fluid over time causing it to pull through the skin.  Tube was easily replaced, balloon inflated, patient ambulated, and the tube remained in place.    As she would like to have the tube removed as soon as possible, I counseled her to call [facility] to try to schedule an appointment for tube removal.  She will call today, or tomorrow for follow-up. Return " precautions were discussed with the patient, and provided in written form with the patient's discharge instructions.     Additional Problems and Disposition    I have discussed management of the patient with the following physicians:   Dr. Gann, Gastroenterologist    Escalation of care considered, and ultimately not performed:  1.  Hospitalization considered patient is requesting G-tube removal, however not recommended by gastroenterology if tube is able to be safely replaced, as there is not any available endoscopy suite time.    Disposition:  Discharged in stable condition    FINAL IMPRESSION   1. Gastrostomy tube dysfunction (HCC)        FOLLOW UP:  Braulio Gann M.D.  [address] [place] [address] Dr [place] [address]  [phone]    Call in 1 day      [facility], Emergency Dept  [address]  [place] [id number] [phone]  Go to   If symptoms worsen

## 2025-04-25 ENCOUNTER — NON-PROVIDER VISIT (OUTPATIENT)
Dept: INTERNAL MEDICINE | Facility: OTHER | Age: 70
End: 2025-04-25
Payer: MEDICARE

## 2025-04-25 VITALS — WEIGHT: 75.4 LBS | BODY MASS INDEX: 13.36 KG/M2

## 2025-04-25 DIAGNOSIS — K86.89 PANCREATIC INSUFFICIENCY: ICD-10-CM

## 2025-04-25 DIAGNOSIS — R63.6 UNDERWEIGHT DUE TO INADEQUATE CALORIC INTAKE: ICD-10-CM

## 2025-04-25 DIAGNOSIS — K31.84 GASTROPARESIS: ICD-10-CM

## 2025-04-25 DIAGNOSIS — E43 SEVERE PROTEIN-CALORIE MALNUTRITION (HCC): ICD-10-CM

## 2025-04-25 DIAGNOSIS — F50.019 ANOREXIA NERVOSA, RESTRICTING TYPE, UNSPECIFIED SEVERITY: ICD-10-CM

## 2025-04-25 ASSESSMENT — FIBROSIS 4 INDEX: FIB4 SCORE: 0.96

## 2025-04-25 NOTE — PROGRESS NOTES
Kathy is here for FU with RD for support with protein calorie malnutrition, being underweight with a BMI less than 19 and history of anorexia, severe protein-calorie malnutrition and history of Sinai-en-Y gastric bypass surgery along with gastroparesis     Her tube fell out of her stomach and everything was leaking out   Has not been feeling good since the tube fell out   Scheduled for surgery on May 20th at [facility] to get it compltely removed   She was able to flush the tube after she went to ER and they replaced the tube     She is able to do 1.5 cans per day/at  night to help get in calories  Prior to all this she was doing well with no diarrhea and eating well but now that has all turned to worse - pain, nausea     Was able to do her mini meals and saw improvement in her weight but sliding backwards    Seeing Dr. Oropeza on Monday - GI     Trying instant carnation shakes instead of boost - does have fiber in it     Increased omprazole to 40 mg recently and restarted her thyroid medication     Despite the tube issues, aKthy is doing well with maintaining her weight and she has responded well to the meal plan we created to support her with the goal of increasing her calories as close to 1524-9063 calories per day to allow her to come off the tube safely. We discussed how to add in the Instant Denn[place] to help with that goal.     Set goals; rtc with RD in 2 weeks    Time spent: 45 minutes

## 2025-04-25 NOTE — PATIENT INSTRUCTIONS
Goals:  Continue your meal plan as we talked about last time  Add in the Instant Tidioute shakes - 2 per day and spreading it out throughout the day   Continue to take the Creon early  Talk to Dr. Oropeza about your concerns about the leaking contents in your abdominal cavity

## 2025-05-13 ENCOUNTER — APPOINTMENT (OUTPATIENT)
Dept: INTERNAL MEDICINE | Facility: OTHER | Age: 70
End: 2025-05-13
Payer: MEDICARE

## 2025-05-19 ENCOUNTER — APPOINTMENT (OUTPATIENT)
Dept: INTERNAL MEDICINE | Facility: OTHER | Age: 70
End: 2025-05-19
Payer: MEDICARE

## 2025-05-22 ENCOUNTER — NON-PROVIDER VISIT (OUTPATIENT)
Dept: INTERNAL MEDICINE | Facility: OTHER | Age: 70
End: 2025-05-22
Payer: MEDICARE

## 2025-05-22 VITALS — WEIGHT: 72.2 LBS | BODY MASS INDEX: 12.79 KG/M2

## 2025-05-22 DIAGNOSIS — K86.1 IDIOPATHIC CHRONIC PANCREATITIS (HCC): ICD-10-CM

## 2025-05-22 DIAGNOSIS — F50.019 ANOREXIA NERVOSA, RESTRICTING TYPE, UNSPECIFIED SEVERITY: ICD-10-CM

## 2025-05-22 DIAGNOSIS — E43 SEVERE PROTEIN-CALORIE MALNUTRITION (HCC): Primary | ICD-10-CM

## 2025-05-22 DIAGNOSIS — K31.84 GASTROPARESIS: ICD-10-CM

## 2025-05-22 ASSESSMENT — FIBROSIS 4 INDEX: FIB4 SCORE: 1.77

## 2025-05-22 NOTE — PATIENT INSTRUCTIONS
Goals:  Get out after meals to the garden to help with digestion and distractions  Work on taking bigger bites     Breakfast - within an hour of waking up   Muffin regular size, coffee, MCT oil 2 tsp with 8 dried cranberries/blueberries, add in 4 oz of tea with honey added      Snack - 1/2 egg, 2 squares of GF danielle crackers, 2 tsp of honey, 1/2 cup of hot cocoa or juice with 1 tsp of MCT oil     Lunch - 1/2 egg, 4 crackers, canned fruit, thin slice of cheese, 2 oz of juice      Snack - 1/2 boost (or instant carnation) and a cookie     Dinner - 2 fish stick or chicken nugget, jello, 2 tbsp instant mashed potato with butter or 2 potatoes or 2 tbsp of rice, 2 oz juice or tea with honey      Snack - 2 cookies, 1/2 scoop of ice cream

## 2025-05-22 NOTE — PROGRESS NOTES
Kathy is here for FU with RD for support with protein calorie malnutrition, being underweight with a BMI less than 19 and history of anorexia, severe protein-calorie malnutrition and history of Sinai-en-Y gastric bypass surgery along with gastroparesis     Weight in April 2025 was at 75.5 lbs  Today she is down to 72.2 lbs    Reports that she went to Urgent Care last week due to pain she felt was related to pancreatitis that also had significant diarrhea - got some pain medications and was encouraged to take electrolytes   Got her J-tube out on Tuesday despite surgeon's recommendation to  not do so   Hasn't been able to get back into the nerve block procedure that she feels really helped her before but getting the run around     Things are little better the last day or two but still having pain  Reports not able to eat much   Not staying hydrated - does not like the taste of water so wanting to get some flavoring packets to help make them taste better - jack zaira drops   Drinking the bottles of instant carnation - many different flavors - getting in 1/2 per day     Does not regret getting the tube out and was hopeful that the bitter taste would go away  Currently feels like it is still there, but may be a little better  Has not tried the MCT oil recently but did do it for a few weeks     Now that Kathy has gotten her TF removed, we talked about the importance of pushing herself to eat larger amounts of food at meals and eating even if she feels full to push things a little farther to get her body used to eating more. We came up with a structured meal plan to help her with eating closer to 1700 calories per day but suspect that it will take her some time. Some of her hesitation today was due to her significant past anorexia history and do recommend counseling to help her during this time.     RTC with RD in 1 week    Time spent: 45 minutes

## 2025-05-27 ENCOUNTER — NON-PROVIDER VISIT (OUTPATIENT)
Dept: INTERNAL MEDICINE | Facility: OTHER | Age: 70
End: 2025-05-27
Payer: MEDICARE

## 2025-05-27 VITALS — BODY MASS INDEX: 12.51 KG/M2 | WEIGHT: 70.6 LBS

## 2025-05-27 DIAGNOSIS — F50.019 ANOREXIA NERVOSA, RESTRICTING TYPE, UNSPECIFIED SEVERITY: ICD-10-CM

## 2025-05-27 DIAGNOSIS — K86.89 PANCREATIC INSUFFICIENCY: ICD-10-CM

## 2025-05-27 DIAGNOSIS — K31.84 GASTROPARESIS: ICD-10-CM

## 2025-05-27 DIAGNOSIS — R63.6 UNDERWEIGHT DUE TO INADEQUATE CALORIC INTAKE: ICD-10-CM

## 2025-05-27 DIAGNOSIS — Z98.84 HISTORY OF ROUX-EN-Y GASTRIC BYPASS: ICD-10-CM

## 2025-05-27 DIAGNOSIS — E43 SEVERE PROTEIN-CALORIE MALNUTRITION (HCC): Primary | ICD-10-CM

## 2025-05-27 ASSESSMENT — FIBROSIS 4 INDEX: FIB4 SCORE: 1.77

## 2025-05-27 NOTE — PATIENT INSTRUCTIONS
Goals:  Let's try taking the Creon 30min-1 hour before eating to see if that helps and go up on Creon to see if helps  Stick to easy to digest foods that sound delicious to you right now - rice, soup, crackers, baked potato, protein shakes a little at a time   Keep sipping on water and drink throughout the day. Don't reply on your thirst   Every hour plan to put something in your mouth

## 2025-05-27 NOTE — PROGRESS NOTES
Kathy is here for FU with RD for support with protein calorie malnutrition, being underweight with a BMI less than 19 and history of anorexia, severe protein-calorie malnutrition and history of Sinai-en-Y gastric bypass surgery along with gastroparesis     Weight down to 70.6 lbs from last visit - she was at 72.2 and her weight in April was over 75 lbs    Seeing pain specialist on Thursday  Pain is very bad  Can't eat   Wanting another pain block to be done   Dealing with diarrhea    She has been able to eat small amounts of off  Today -- small handful of dry ceral and coffee  Last nigh t- 2 small cookies and bite of icecream  Dinner yesterday -- 1/3 cup of chx rice soup, couple of crackers   Lunch was jello and couple of crackers    Taking her Creon but not helping her too much  Seeing PCP tomorrow     Bitter taste even woke her up in the middle of the night     Worked with Kathy on focusing more on low fiber, low fat, high carbohydrate foods right now, increasing her Creon use and making sure she is taking it 30min-1 hour before eating, planning to have a snack every 2 hours and to keep pushing herself to get in those calories  Set goals; rtc with RD in 1 week    Time spent: 45 minutes

## (undated) DEVICE — TUBE E-T HI-LO CUFF 6.5MM (10EA/BX)

## (undated) DEVICE — LACTATED RINGERS INJ 1000 ML - (14EA/CA 60CA/PF)

## (undated) DEVICE — CANISTER SUCTION 3000ML MECHANICAL FILTER AUTO SHUTOFF MEDI-VAC NONSTERILE LF DISP  (40EA/CA)

## (undated) DEVICE — TUBE CONNECTING SUCTION - CLEAR PLASTIC STERILE 72 IN (50EA/CA)

## (undated) DEVICE — SODIUM CHL IRRIGATION 0.9% 1000ML (12EA/CA)

## (undated) DEVICE — TUBING CLEARLINK DUO-VENT - C-FLO (48EA/CA)

## (undated) DEVICE — KIT CUSTOM PROCEDURE SINGLE FOR ENDO  (15/CA)

## (undated) DEVICE — SET EXTENSION WITH 2 PORTS (48EA/CA) ***PART #2C8610 IS A SUBSTITUTE*****

## (undated) DEVICE — TUBE E-T HI-LO CUFF 8.0MM (10EA/PK)

## (undated) DEVICE — CATHETER IV SAFETY 20 GA X 1-1/4 (50/BX)

## (undated) DEVICE — DRAPESURG STERI-DRAPE LONG - (10/BX 4BX/CA)

## (undated) DEVICE — SUCTION INSTRUMENT YANKAUER BULBOUS TIP W/O VENT (50EA/CA)

## (undated) DEVICE — PACK MAJOR BASIN - (2EA/CA)

## (undated) DEVICE — TUBE SUCTION YANKAUER  1/4 X 6FT (20EA/CA)"

## (undated) DEVICE — GOWN WARMING STANDARD FLEX - (30/CA)

## (undated) DEVICE — SUTURE 4-0 PROLENE SH 36 (36PK/BX)"

## (undated) DEVICE — MASK OXYGEN VNYL ADLT MED CONC WITH 7 FOOT TUBING  - (50EA/CA)

## (undated) DEVICE — STAPLE 45MM BLUE 4.5MM (12EA/BX)

## (undated) DEVICE — TUBE E-T HI-LO CUFF 7.0MM (10EA/PK)

## (undated) DEVICE — COVER LIGHT HANDLE ALC PLUS DISP (18EA/BX)

## (undated) DEVICE — CATHETER IV SAFETY 22 GA X 1 (50EA/BX)

## (undated) DEVICE — MASK ANESTHESIA ADULT  - (100/CA)

## (undated) DEVICE — NEPTUNE 4 PORT MANIFOLD - (20/PK)

## (undated) DEVICE — KIT ANESTHESIA W/CIRCUIT & 3/LT BAG W/FILTER (20EA/CA)

## (undated) DEVICE — SYSTEM PEEL & PLACE 13CM INCISIONS

## (undated) DEVICE — ELECTRODE 850 FOAM ADHESIVE - HYDROGEL RADIOTRNSPRNT (50/PK)

## (undated) DEVICE — BALLOON CRE WG 18-20MM 240CM 5.5 F G

## (undated) DEVICE — CHLORAPREP 26 ML APPLICATOR - ORANGE TINT(25/CA)

## (undated) DEVICE — SUTURE 2-0 SILK SH 30 IN C/R (12PK/BX)

## (undated) DEVICE — VESSELOOP MAXI BLUE STERILE- SURG-I-LOOP (10EA/BX)

## (undated) DEVICE — FORCEP GRASPING RESCUE COMBO RAT/ALLIGATOR (5EA/BX)

## (undated) DEVICE — GUIDE JAGWIRE 035 STRAIGHT (2EA/BX)

## (undated) DEVICE — Device

## (undated) DEVICE — SET LEADWIRE 5 LEAD BEDSIDE DISPOSABLE ECG (1SET OF 5/EA)

## (undated) DEVICE — KIT 20 FRENCH PULL SAFETY PEG: Type: IMPLANTABLE DEVICE | Site: ESOPHAGUS | Status: NON-FUNCTIONAL

## (undated) DEVICE — BALLOON RETRIEVAL EXTRACTOR PRO RX   9-12MM

## (undated) DEVICE — EXTRACTOR PRO XL 9-12 MM ABOVE

## (undated) DEVICE — KIT  I.V. START (100EA/CA)

## (undated) DEVICE — SYRINGE ALLIANCE INFLATION (5EA/BX)

## (undated) DEVICE — SLEEVE VASO CALF MED - (10PR/CA)

## (undated) DEVICE — BLOCK BITE MAXI DENTAL RETENTION RIM (100EA/BX)

## (undated) DEVICE — SYRINGE SAFETY 10 ML 18 GA X 1 1/2 BLUNT LL (100/BX 4BX/CA)

## (undated) DEVICE — CANISTER SUCTION RIGID RED 1500CC (40EA/CA)

## (undated) DEVICE — SUTURE 4-0 PROLENE RB-1 D/A 36 (36PK/BX)"

## (undated) DEVICE — GLOVE, LITE (PAIR)

## (undated) DEVICE — ADHESIVE MASTISOL - (48/BX)

## (undated) DEVICE — WATER IRRIGATION STERILE 1000ML (12EA/CA)

## (undated) DEVICE — SYRINGE DISP. 60 CC LL - (30/BX, 12BX/CA)**WHEN THESE ARE GONE ORDER #500206**

## (undated) DEVICE — SENSOR OXIMETER ADULT SPO2 RD SET (20EA/BX)

## (undated) DEVICE — SYRINGE SAFETY 5 ML 18 GA X 1-1/2 BLUNT LL (100/BX 4BX/CA)

## (undated) DEVICE — SUTURE 3-0 27IN PDS PLUS CLR - SH (36/BX)

## (undated) DEVICE — DRAPE MAYO STAND - (30/CA)

## (undated) DEVICE — SUTURE 2-0 VICRYL PLUS TP-1 - (24/BX)

## (undated) DEVICE — BALLOON CRE WG 10-12MM 240CM 5.5 F G

## (undated) DEVICE — DRAPE LAPAROTOMY T SHEET - (12EA/CA)

## (undated) DEVICE — SUTURE GENERAL

## (undated) DEVICE — PAD LAP STERILE 18 X 18 - (5/PK 40PK/CA)

## (undated) DEVICE — SUTURE 2-0 ETHILON FS - (36/BX) 18 INCH

## (undated) DEVICE — SYRINGE 50ML CATHETER TIP (40EA/BX 4BX/CA)

## (undated) DEVICE — GOWN SURGEONS LARGE - (32/CA)

## (undated) DEVICE — FILM CASSETTE ENDO

## (undated) DEVICE — SENSOR SPO2 ADULT LNCS ADTX (20/BX) ORDER ITEM #19593

## (undated) DEVICE — DEVICE HEMOSTATIC CLIPPING RESOLUTION 360 DEGREES (20EA/BX)

## (undated) DEVICE — FORCEP RADIAL JAW 4 STANDARD CAPACITY W/NEEDLE 240CM (40EA/BX)

## (undated) DEVICE — TUBE E-T HI-LO CUFF 7.5MM (10EA/PK)

## (undated) DEVICE — TOWEL STOP TIMEOUT SAFETY FLAG (40EA/CA)

## (undated) DEVICE — MASK WITH FACE SHIELD (25/BX 4BX/CA)

## (undated) DEVICE — SPONGE LAP XR ST 36X36 LF - (1/PK40PK/CA)

## (undated) DEVICE — TRAY SURESTEP FOLEY TEMP SENSING 16FR (10EA/CA) ORDER  #18764 FOR TEMP FOLEY ONLY

## (undated) DEVICE — SPHINCTEROTOME CLEVER CUT

## (undated) DEVICE — SYRINGE SAFETY 3 ML 18 GA X 1 1/2 BLUNT LL (100/BX 8BX/CA)

## (undated) DEVICE — GLOVE BIOGEL SZ 8 SURGICAL PF LTX - (50PR/BX 4BX/CA)

## (undated) DEVICE — CLIP MED LG INTNL HRZN TI ESCP - (20/BX)

## (undated) DEVICE — SUTURE 4-0 PDS II RB-1 (36EA/BX)

## (undated) DEVICE — DRAIN PENROSE STERILE 1/4 X - 18 IN  (25EA/BX)

## (undated) DEVICE — CLIP MED INTNL HRZN TI ESCP - (25/BX)

## (undated) DEVICE — SPONGE GAUZE NON-STERILE 4X4 - (2000/CA 10PK/CA)

## (undated) DEVICE — DRAPE IOBAN II INCISE 23X17 - (10EA/BX 4BX/CA)

## (undated) DEVICE — SUTURE 1 PDS II PLUS TP-1 - (12PK/BX)

## (undated) DEVICE — CLIP LG INTNL HRZN TI ESCP LGT - (20/BX)

## (undated) DEVICE — BITE BLOCK ADULT 60FR (100EA/CA)

## (undated) DEVICE — SUTURE 3-0 SILK SH (12PK/BX)

## (undated) DEVICE — SURGIFOAM (SIZE 100) - (6EA/CA)

## (undated) DEVICE — STAPLER 45MM ARTICULATING - ENDO (3EA/BX)

## (undated) DEVICE — ELECTRODE DUAL RETURN W/ CORD - (50/PK)

## (undated) DEVICE — TUBE E-T HI-LO CUFF 8.5MM (10EA/PK)

## (undated) DEVICE — SPONGE GAUZESTER 4 X 4 4PLY - (128PK/CA)

## (undated) DEVICE — KIT ENDOVIVE 24FR SAFETY PEG PULL WITH ENFIT (2EA/BX)

## (undated) DEVICE — BUTTON ENDOSCOPY DISPOSABLE